# Patient Record
Sex: MALE | Race: WHITE | Employment: FULL TIME | URBAN - METROPOLITAN AREA
[De-identification: names, ages, dates, MRNs, and addresses within clinical notes are randomized per-mention and may not be internally consistent; named-entity substitution may affect disease eponyms.]

---

## 2021-01-01 ENCOUNTER — APPOINTMENT (OUTPATIENT)
Dept: GENERAL RADIOLOGY | Age: 51
DRG: 870 | End: 2021-01-01
Attending: INTERNAL MEDICINE
Payer: COMMERCIAL

## 2021-01-01 ENCOUNTER — HOSPITAL ENCOUNTER (INPATIENT)
Age: 51
LOS: 9 days | DRG: 870 | End: 2021-11-09
Attending: INTERNAL MEDICINE | Admitting: INTERNAL MEDICINE
Payer: COMMERCIAL

## 2021-01-01 VITALS
TEMPERATURE: 97 F | BODY MASS INDEX: 39.15 KG/M2 | RESPIRATION RATE: 44 BRPM | DIASTOLIC BLOOD PRESSURE: 81 MMHG | HEIGHT: 66 IN | HEART RATE: 106 BPM | WEIGHT: 243.61 LBS | OXYGEN SATURATION: 64 % | SYSTOLIC BLOOD PRESSURE: 137 MMHG

## 2021-01-01 DIAGNOSIS — J12.82 PNEUMONIA DUE TO COVID-19 VIRUS: Primary | ICD-10-CM

## 2021-01-01 DIAGNOSIS — U07.1 PNEUMONIA DUE TO COVID-19 VIRUS: Primary | ICD-10-CM

## 2021-01-01 LAB
A/G RATIO: 0.8 (ref 1.1–2.2)
ALBUMIN SERPL-MCNC: 2.3 G/DL (ref 3.4–5)
ALBUMIN SERPL-MCNC: 2.4 G/DL (ref 3.4–5)
ALBUMIN SERPL-MCNC: 2.5 G/DL (ref 3.4–5)
ALBUMIN SERPL-MCNC: 2.6 G/DL (ref 3.4–5)
ALBUMIN SERPL-MCNC: 2.7 G/DL (ref 3.4–5)
ALBUMIN SERPL-MCNC: 2.8 G/DL (ref 3.4–5)
ALBUMIN SERPL-MCNC: 2.8 G/DL (ref 3.4–5)
ALP BLD-CCNC: 118 U/L (ref 40–129)
ALP BLD-CCNC: 124 U/L (ref 40–129)
ALP BLD-CCNC: 129 U/L (ref 40–129)
ALP BLD-CCNC: 133 U/L (ref 40–129)
ALP BLD-CCNC: 142 U/L (ref 40–129)
ALP BLD-CCNC: 147 U/L (ref 40–129)
ALP BLD-CCNC: 158 U/L (ref 40–129)
ALP BLD-CCNC: 166 U/L (ref 40–129)
ALP BLD-CCNC: 168 U/L (ref 40–129)
ALP BLD-CCNC: 181 U/L (ref 40–129)
ALP BLD-CCNC: 194 U/L (ref 40–129)
ALP BLD-CCNC: 209 U/L (ref 40–129)
ALP BLD-CCNC: 211 U/L (ref 40–129)
ALT SERPL-CCNC: 102 U/L (ref 10–40)
ALT SERPL-CCNC: 38 U/L (ref 10–40)
ALT SERPL-CCNC: 40 U/L (ref 10–40)
ALT SERPL-CCNC: 52 U/L (ref 10–40)
ALT SERPL-CCNC: 6823 U/L (ref 10–40)
ALT SERPL-CCNC: 69 U/L (ref 10–40)
ALT SERPL-CCNC: 74 U/L (ref 10–40)
ALT SERPL-CCNC: 80 U/L (ref 10–40)
ALT SERPL-CCNC: 96 U/L (ref 10–40)
ALT SERPL-CCNC: >7000 U/L (ref 10–40)
ANION GAP SERPL CALCULATED.3IONS-SCNC: 10 MMOL/L (ref 3–16)
ANION GAP SERPL CALCULATED.3IONS-SCNC: 11 MMOL/L (ref 3–16)
ANION GAP SERPL CALCULATED.3IONS-SCNC: 12 MMOL/L (ref 3–16)
ANION GAP SERPL CALCULATED.3IONS-SCNC: 12 MMOL/L (ref 3–16)
ANION GAP SERPL CALCULATED.3IONS-SCNC: 13 MMOL/L (ref 3–16)
ANION GAP SERPL CALCULATED.3IONS-SCNC: 14 MMOL/L (ref 3–16)
ANION GAP SERPL CALCULATED.3IONS-SCNC: 14 MMOL/L (ref 3–16)
ANION GAP SERPL CALCULATED.3IONS-SCNC: 21 MMOL/L (ref 3–16)
ANION GAP SERPL CALCULATED.3IONS-SCNC: 22 MMOL/L (ref 3–16)
ANION GAP SERPL CALCULATED.3IONS-SCNC: 22 MMOL/L (ref 3–16)
ANION GAP SERPL CALCULATED.3IONS-SCNC: 23 MMOL/L (ref 3–16)
ANION GAP SERPL CALCULATED.3IONS-SCNC: 24 MMOL/L (ref 3–16)
ANION GAP SERPL CALCULATED.3IONS-SCNC: 24 MMOL/L (ref 3–16)
ANION GAP SERPL CALCULATED.3IONS-SCNC: 26 MMOL/L (ref 3–16)
ANION GAP SERPL CALCULATED.3IONS-SCNC: 9 MMOL/L (ref 3–16)
ANISOCYTOSIS: ABNORMAL
ANTI-XA UNFRAC HEPARIN: 0.14 IU/ML (ref 0.3–0.7)
ANTI-XA UNFRAC HEPARIN: 0.17 IU/ML (ref 0.3–0.7)
ANTI-XA UNFRAC HEPARIN: 0.25 IU/ML (ref 0.3–0.7)
ANTI-XA UNFRAC HEPARIN: 0.29 IU/ML (ref 0.3–0.7)
ANTI-XA UNFRAC HEPARIN: 0.34 IU/ML (ref 0.3–0.7)
ANTI-XA UNFRAC HEPARIN: 0.39 IU/ML (ref 0.3–0.7)
ANTI-XA UNFRAC HEPARIN: 0.42 IU/ML (ref 0.3–0.7)
ANTI-XA UNFRAC HEPARIN: 0.42 IU/ML (ref 0.3–0.7)
ANTI-XA UNFRAC HEPARIN: 0.46 IU/ML (ref 0.3–0.7)
ANTI-XA UNFRAC HEPARIN: 0.57 IU/ML (ref 0.3–0.7)
ANTI-XA UNFRAC HEPARIN: 0.58 IU/ML (ref 0.3–0.7)
ANTI-XA UNFRAC HEPARIN: 0.69 IU/ML (ref 0.3–0.7)
ANTI-XA UNFRAC HEPARIN: 0.72 IU/ML (ref 0.3–0.7)
ANTI-XA UNFRAC HEPARIN: 1.09 IU/ML (ref 0.3–0.7)
ANTI-XA UNFRAC HEPARIN: 1.35 IU/ML (ref 0.3–0.7)
APTT: 28.8 SEC (ref 26.2–38.6)
AST SERPL-CCNC: 26 U/L (ref 15–37)
AST SERPL-CCNC: 27 U/L (ref 15–37)
AST SERPL-CCNC: 27 U/L (ref 15–37)
AST SERPL-CCNC: 31 U/L (ref 15–37)
AST SERPL-CCNC: 32 U/L (ref 15–37)
AST SERPL-CCNC: 43 U/L (ref 15–37)
AST SERPL-CCNC: 49 U/L (ref 15–37)
AST SERPL-CCNC: 49 U/L (ref 15–37)
AST SERPL-CCNC: >7000 U/L (ref 15–37)
ATYPICAL LYMPHOCYTE RELATIVE PERCENT: 1 % (ref 0–6)
ATYPICAL LYMPHOCYTE RELATIVE PERCENT: 2 % (ref 0–6)
BACTERIA: ABNORMAL /HPF
BANDED NEUTROPHILS RELATIVE PERCENT: 1 % (ref 0–7)
BANDED NEUTROPHILS RELATIVE PERCENT: 11 % (ref 0–7)
BANDED NEUTROPHILS RELATIVE PERCENT: 3 % (ref 0–7)
BANDED NEUTROPHILS RELATIVE PERCENT: 3 % (ref 0–7)
BANDED NEUTROPHILS RELATIVE PERCENT: 9 % (ref 0–7)
BASE EXCESS ARTERIAL: -1.1 MMOL/L (ref -3–3)
BASE EXCESS ARTERIAL: -1.8 MMOL/L (ref -3–3)
BASE EXCESS ARTERIAL: -10 (ref -3–3)
BASE EXCESS ARTERIAL: -4 (ref -3–3)
BASE EXCESS ARTERIAL: -4 (ref -3–3)
BASE EXCESS ARTERIAL: -4.1 MMOL/L (ref -3–3)
BASE EXCESS ARTERIAL: -4.4 MMOL/L (ref -3–3)
BASE EXCESS ARTERIAL: -4.8 MMOL/L (ref -3–3)
BASE EXCESS ARTERIAL: -4.8 MMOL/L (ref -3–3)
BASE EXCESS ARTERIAL: -5 (ref -3–3)
BASE EXCESS ARTERIAL: -5.1 MMOL/L (ref -3–3)
BASE EXCESS ARTERIAL: -5.4 MMOL/L (ref -3–3)
BASE EXCESS ARTERIAL: -5.9 MMOL/L (ref -3–3)
BASE EXCESS ARTERIAL: -7 (ref -3–3)
BASE EXCESS ARTERIAL: -8.9 MMOL/L (ref -3–3)
BASE EXCESS ARTERIAL: -9 (ref -3–3)
BASE EXCESS ARTERIAL: -9 (ref -3–3)
BASE EXCESS ARTERIAL: 1 MMOL/L (ref -3–3)
BASE EXCESS ARTERIAL: 2.9 MMOL/L (ref -3–3)
BASE EXCESS ARTERIAL: 3 MMOL/L (ref -3–3)
BASE EXCESS ARTERIAL: 3.3 MMOL/L (ref -3–3)
BASE EXCESS ARTERIAL: 4.3 MMOL/L (ref -3–3)
BASE EXCESS ARTERIAL: 4.6 MMOL/L (ref -3–3)
BASE EXCESS ARTERIAL: 5 (ref -3–3)
BASE EXCESS ARTERIAL: 5 (ref -3–3)
BASE EXCESS ARTERIAL: 6.5 MMOL/L (ref -3–3)
BASE EXCESS ARTERIAL: 8.2 MMOL/L (ref -3–3)
BASOPHILS ABSOLUTE: 0 K/UL (ref 0–0.2)
BASOPHILS RELATIVE PERCENT: 0 %
BILIRUB SERPL-MCNC: 0.4 MG/DL (ref 0–1)
BILIRUB SERPL-MCNC: 0.6 MG/DL (ref 0–1)
BILIRUB SERPL-MCNC: 0.7 MG/DL (ref 0–1)
BILIRUB SERPL-MCNC: 0.7 MG/DL (ref 0–1)
BILIRUB SERPL-MCNC: 0.8 MG/DL (ref 0–1)
BILIRUB SERPL-MCNC: 0.9 MG/DL (ref 0–1)
BILIRUB SERPL-MCNC: 0.9 MG/DL (ref 0–1)
BILIRUB SERPL-MCNC: 1 MG/DL (ref 0–1)
BILIRUB SERPL-MCNC: 2.5 MG/DL (ref 0–1)
BILIRUB SERPL-MCNC: 2.6 MG/DL (ref 0–1)
BILIRUB SERPL-MCNC: 3 MG/DL (ref 0–1)
BILIRUB SERPL-MCNC: 3.8 MG/DL (ref 0–1)
BILIRUB SERPL-MCNC: 3.8 MG/DL (ref 0–1)
BILIRUBIN DIRECT: 0.3 MG/DL (ref 0–0.3)
BILIRUBIN DIRECT: 0.5 MG/DL (ref 0–0.3)
BILIRUBIN DIRECT: 0.5 MG/DL (ref 0–0.3)
BILIRUBIN DIRECT: 0.6 MG/DL (ref 0–0.3)
BILIRUBIN DIRECT: 0.7 MG/DL (ref 0–0.3)
BILIRUBIN DIRECT: 0.7 MG/DL (ref 0–0.3)
BILIRUBIN DIRECT: 0.8 MG/DL (ref 0–0.3)
BILIRUBIN DIRECT: 2.2 MG/DL (ref 0–0.3)
BILIRUBIN DIRECT: 3.3 MG/DL (ref 0–0.3)
BILIRUBIN URINE: NEGATIVE
BILIRUBIN, INDIRECT: 0.1 MG/DL (ref 0–1)
BILIRUBIN, INDIRECT: 0.2 MG/DL (ref 0–1)
BILIRUBIN, INDIRECT: 0.2 MG/DL (ref 0–1)
BILIRUBIN, INDIRECT: 0.4 MG/DL (ref 0–1)
BILIRUBIN, INDIRECT: 0.5 MG/DL (ref 0–1)
BLASTS RELATIVE PERCENT: 2 %
BLOOD CULTURE, ROUTINE: NORMAL
BLOOD, URINE: ABNORMAL
BUN BLDV-MCNC: 110 MG/DL (ref 7–20)
BUN BLDV-MCNC: 126 MG/DL (ref 7–20)
BUN BLDV-MCNC: 134 MG/DL (ref 7–20)
BUN BLDV-MCNC: 135 MG/DL (ref 7–20)
BUN BLDV-MCNC: 135 MG/DL (ref 7–20)
BUN BLDV-MCNC: 138 MG/DL (ref 7–20)
BUN BLDV-MCNC: 138 MG/DL (ref 7–20)
BUN BLDV-MCNC: 141 MG/DL (ref 7–20)
BUN BLDV-MCNC: 142 MG/DL (ref 7–20)
BUN BLDV-MCNC: 142 MG/DL (ref 7–20)
BUN BLDV-MCNC: 143 MG/DL (ref 7–20)
BUN BLDV-MCNC: 143 MG/DL (ref 7–20)
BUN BLDV-MCNC: 145 MG/DL (ref 7–20)
BUN BLDV-MCNC: 146 MG/DL (ref 7–20)
BUN BLDV-MCNC: 28 MG/DL (ref 7–20)
BUN BLDV-MCNC: 42 MG/DL (ref 7–20)
BUN BLDV-MCNC: 53 MG/DL (ref 7–20)
BUN BLDV-MCNC: 70 MG/DL (ref 7–20)
BUN BLDV-MCNC: 90 MG/DL (ref 7–20)
BUN BLDV-MCNC: 99 MG/DL (ref 7–20)
BUN BLDV-MCNC: 99 MG/DL (ref 7–20)
C-REACTIVE PROTEIN: 434.3 MG/L (ref 0–5.1)
C-REACTIVE PROTEIN: 47.8 MG/L (ref 0–5.1)
C-REACTIVE PROTEIN: 66.2 MG/L (ref 0–5.1)
CALCIUM IONIZED: 0.8 MMOL/L (ref 1.12–1.32)
CALCIUM IONIZED: 0.83 MMOL/L (ref 1.12–1.32)
CALCIUM IONIZED: 1.18 MMOL/L (ref 1.12–1.32)
CALCIUM IONIZED: 1.24 MMOL/L (ref 1.12–1.32)
CALCIUM IONIZED: 1.26 MMOL/L (ref 1.12–1.32)
CALCIUM IONIZED: 1.3 MMOL/L (ref 1.12–1.32)
CALCIUM IONIZED: 1.31 MMOL/L (ref 1.12–1.32)
CALCIUM SERPL-MCNC: 6.1 MG/DL (ref 8.3–10.6)
CALCIUM SERPL-MCNC: 6.2 MG/DL (ref 8.3–10.6)
CALCIUM SERPL-MCNC: 6.2 MG/DL (ref 8.3–10.6)
CALCIUM SERPL-MCNC: 6.3 MG/DL (ref 8.3–10.6)
CALCIUM SERPL-MCNC: 7.6 MG/DL (ref 8.3–10.6)
CALCIUM SERPL-MCNC: 8 MG/DL (ref 8.3–10.6)
CALCIUM SERPL-MCNC: 8 MG/DL (ref 8.3–10.6)
CALCIUM SERPL-MCNC: 8.1 MG/DL (ref 8.3–10.6)
CALCIUM SERPL-MCNC: 8.2 MG/DL (ref 8.3–10.6)
CALCIUM SERPL-MCNC: 8.3 MG/DL (ref 8.3–10.6)
CALCIUM SERPL-MCNC: 8.4 MG/DL (ref 8.3–10.6)
CALCIUM SERPL-MCNC: 8.6 MG/DL (ref 8.3–10.6)
CALCIUM SERPL-MCNC: 8.6 MG/DL (ref 8.3–10.6)
CARBOXYHEMOGLOBIN ARTERIAL: 0.4 % (ref 0–1.5)
CARBOXYHEMOGLOBIN ARTERIAL: 1.5 % (ref 0–1.5)
CARBOXYHEMOGLOBIN ARTERIAL: 1.7 % (ref 0–1.5)
CARBOXYHEMOGLOBIN ARTERIAL: 1.9 % (ref 0–1.5)
CARBOXYHEMOGLOBIN ARTERIAL: 2 % (ref 0–1.5)
CARBOXYHEMOGLOBIN ARTERIAL: 2.1 % (ref 0–1.5)
CARBOXYHEMOGLOBIN ARTERIAL: 2.2 % (ref 0–1.5)
CARBOXYHEMOGLOBIN ARTERIAL: 2.3 % (ref 0–1.5)
CARBOXYHEMOGLOBIN ARTERIAL: 2.4 % (ref 0–1.5)
CARBOXYHEMOGLOBIN ARTERIAL: 2.5 % (ref 0–1.5)
CARBOXYHEMOGLOBIN ARTERIAL: 2.5 % (ref 0–1.5)
CARBOXYHEMOGLOBIN ARTERIAL: 2.6 % (ref 0–1.5)
CARBOXYHEMOGLOBIN ARTERIAL: 2.8 % (ref 0–1.5)
CELLULAR CASTS: ABNORMAL /LPF
CHLORIDE BLD-SCNC: 101 MMOL/L (ref 99–110)
CHLORIDE BLD-SCNC: 104 MMOL/L (ref 99–110)
CHLORIDE BLD-SCNC: 105 MMOL/L (ref 99–110)
CHLORIDE BLD-SCNC: 106 MMOL/L (ref 99–110)
CHLORIDE BLD-SCNC: 106 MMOL/L (ref 99–110)
CHLORIDE BLD-SCNC: 107 MMOL/L (ref 99–110)
CHLORIDE BLD-SCNC: 107 MMOL/L (ref 99–110)
CHLORIDE BLD-SCNC: 108 MMOL/L (ref 99–110)
CHLORIDE BLD-SCNC: 109 MMOL/L (ref 99–110)
CHLORIDE BLD-SCNC: 109 MMOL/L (ref 99–110)
CHLORIDE BLD-SCNC: 110 MMOL/L (ref 99–110)
CHLORIDE BLD-SCNC: 88 MMOL/L (ref 99–110)
CHLORIDE BLD-SCNC: 89 MMOL/L (ref 99–110)
CHLORIDE BLD-SCNC: 94 MMOL/L (ref 99–110)
CHLORIDE BLD-SCNC: 94 MMOL/L (ref 99–110)
CHLORIDE BLD-SCNC: 97 MMOL/L (ref 99–110)
CHLORIDE BLD-SCNC: 99 MMOL/L (ref 99–110)
CLARITY: ABNORMAL
CO2: 17 MMOL/L (ref 21–32)
CO2: 17 MMOL/L (ref 21–32)
CO2: 18 MMOL/L (ref 21–32)
CO2: 18 MMOL/L (ref 21–32)
CO2: 19 MMOL/L (ref 21–32)
CO2: 19 MMOL/L (ref 21–32)
CO2: 20 MMOL/L (ref 21–32)
CO2: 21 MMOL/L (ref 21–32)
CO2: 21 MMOL/L (ref 21–32)
CO2: 22 MMOL/L (ref 21–32)
CO2: 23 MMOL/L (ref 21–32)
CO2: 25 MMOL/L (ref 21–32)
CO2: 27 MMOL/L (ref 21–32)
CO2: 27 MMOL/L (ref 21–32)
CO2: 28 MMOL/L (ref 21–32)
CO2: 28 MMOL/L (ref 21–32)
CO2: 29 MMOL/L (ref 21–32)
CO2: 29 MMOL/L (ref 21–32)
COLOR: YELLOW
CREAT SERPL-MCNC: 1.2 MG/DL (ref 0.9–1.3)
CREAT SERPL-MCNC: 1.8 MG/DL (ref 0.9–1.3)
CREAT SERPL-MCNC: 2 MG/DL (ref 0.9–1.3)
CREAT SERPL-MCNC: 2.5 MG/DL (ref 0.9–1.3)
CREAT SERPL-MCNC: 3.1 MG/DL (ref 0.9–1.3)
CREAT SERPL-MCNC: 3.4 MG/DL (ref 0.9–1.3)
CREAT SERPL-MCNC: 3.5 MG/DL (ref 0.9–1.3)
CREAT SERPL-MCNC: 3.6 MG/DL (ref 0.9–1.3)
CREAT SERPL-MCNC: 3.8 MG/DL (ref 0.9–1.3)
CREAT SERPL-MCNC: 3.9 MG/DL (ref 0.9–1.3)
CREAT SERPL-MCNC: 4 MG/DL (ref 0.9–1.3)
CREAT SERPL-MCNC: 4 MG/DL (ref 0.9–1.3)
CREAT SERPL-MCNC: 4.1 MG/DL (ref 0.9–1.3)
CREAT SERPL-MCNC: 4.2 MG/DL (ref 0.9–1.3)
CREAT SERPL-MCNC: 4.3 MG/DL (ref 0.9–1.3)
CREAT SERPL-MCNC: 4.3 MG/DL (ref 0.9–1.3)
CREAT SERPL-MCNC: 4.9 MG/DL (ref 0.9–1.3)
CREAT SERPL-MCNC: 5 MG/DL (ref 0.9–1.3)
CREAT SERPL-MCNC: 5.1 MG/DL (ref 0.9–1.3)
CREAT SERPL-MCNC: 5.1 MG/DL (ref 0.9–1.3)
CREAT SERPL-MCNC: 5.2 MG/DL (ref 0.9–1.3)
CULTURE, BLOOD 2: NORMAL
D DIMER: 2262 NG/ML DDU (ref 0–229)
EKG ATRIAL RATE: 106 BPM
EKG ATRIAL RATE: 120 BPM
EKG ATRIAL RATE: 129 BPM
EKG DIAGNOSIS: NORMAL
EKG P AXIS: 48 DEGREES
EKG P AXIS: 49 DEGREES
EKG P AXIS: 53 DEGREES
EKG P-R INTERVAL: 112 MS
EKG P-R INTERVAL: 116 MS
EKG P-R INTERVAL: 116 MS
EKG Q-T INTERVAL: 296 MS
EKG Q-T INTERVAL: 322 MS
EKG Q-T INTERVAL: 328 MS
EKG QRS DURATION: 84 MS
EKG QRS DURATION: 86 MS
EKG QRS DURATION: 86 MS
EKG QTC CALCULATION (BAZETT): 433 MS
EKG QTC CALCULATION (BAZETT): 435 MS
EKG QTC CALCULATION (BAZETT): 455 MS
EKG R AXIS: 48 DEGREES
EKG R AXIS: 58 DEGREES
EKG R AXIS: 59 DEGREES
EKG T AXIS: -11 DEGREES
EKG T AXIS: 34 DEGREES
EKG T AXIS: 52 DEGREES
EKG VENTRICULAR RATE: 106 BPM
EKG VENTRICULAR RATE: 120 BPM
EKG VENTRICULAR RATE: 129 BPM
EOSINOPHILS ABSOLUTE: 0 K/UL (ref 0–0.6)
EOSINOPHILS ABSOLUTE: 0.2 K/UL (ref 0–0.6)
EOSINOPHILS ABSOLUTE: 0.2 K/UL (ref 0–0.6)
EOSINOPHILS ABSOLUTE: 0.3 K/UL (ref 0–0.6)
EOSINOPHILS RELATIVE PERCENT: 0 %
EOSINOPHILS RELATIVE PERCENT: 1 %
EOSINOPHILS RELATIVE PERCENT: 1 %
EOSINOPHILS RELATIVE PERCENT: 2 %
ESTIMATED AVERAGE GLUCOSE: 142.7 MG/DL
FIBRINOGEN: 356 MG/DL (ref 200–397)
FINE CASTS, UA: ABNORMAL /LPF (ref 0–2)
GFR AFRICAN AMERICAN: 14
GFR AFRICAN AMERICAN: 15
GFR AFRICAN AMERICAN: 15
GFR AFRICAN AMERICAN: 18
GFR AFRICAN AMERICAN: 19
GFR AFRICAN AMERICAN: 20
GFR AFRICAN AMERICAN: 20
GFR AFRICAN AMERICAN: 22
GFR AFRICAN AMERICAN: 23
GFR AFRICAN AMERICAN: 26
GFR AFRICAN AMERICAN: 33
GFR AFRICAN AMERICAN: 43
GFR AFRICAN AMERICAN: 48
GFR AFRICAN AMERICAN: >60
GFR NON-AFRICAN AMERICAN: 12
GFR NON-AFRICAN AMERICAN: 13
GFR NON-AFRICAN AMERICAN: 15
GFR NON-AFRICAN AMERICAN: 16
GFR NON-AFRICAN AMERICAN: 17
GFR NON-AFRICAN AMERICAN: 18
GFR NON-AFRICAN AMERICAN: 19
GFR NON-AFRICAN AMERICAN: 21
GFR NON-AFRICAN AMERICAN: 27
GFR NON-AFRICAN AMERICAN: 35
GFR NON-AFRICAN AMERICAN: 40
GFR NON-AFRICAN AMERICAN: >60
GLUCOSE BLD-MCNC: 105 MG/DL (ref 70–99)
GLUCOSE BLD-MCNC: 109 MG/DL (ref 70–99)
GLUCOSE BLD-MCNC: 119 MG/DL (ref 70–99)
GLUCOSE BLD-MCNC: 123 MG/DL (ref 70–99)
GLUCOSE BLD-MCNC: 125 MG/DL (ref 70–99)
GLUCOSE BLD-MCNC: 141 MG/DL (ref 70–99)
GLUCOSE BLD-MCNC: 141 MG/DL (ref 70–99)
GLUCOSE BLD-MCNC: 145 MG/DL (ref 70–99)
GLUCOSE BLD-MCNC: 146 MG/DL (ref 70–99)
GLUCOSE BLD-MCNC: 150 MG/DL (ref 70–99)
GLUCOSE BLD-MCNC: 155 MG/DL (ref 70–99)
GLUCOSE BLD-MCNC: 157 MG/DL (ref 70–99)
GLUCOSE BLD-MCNC: 159 MG/DL (ref 70–99)
GLUCOSE BLD-MCNC: 162 MG/DL (ref 70–99)
GLUCOSE BLD-MCNC: 163 MG/DL (ref 70–99)
GLUCOSE BLD-MCNC: 167 MG/DL (ref 70–99)
GLUCOSE BLD-MCNC: 167 MG/DL (ref 70–99)
GLUCOSE BLD-MCNC: 169 MG/DL (ref 70–99)
GLUCOSE BLD-MCNC: 174 MG/DL (ref 70–99)
GLUCOSE BLD-MCNC: 178 MG/DL (ref 70–99)
GLUCOSE BLD-MCNC: 178 MG/DL (ref 70–99)
GLUCOSE BLD-MCNC: 180 MG/DL (ref 70–99)
GLUCOSE BLD-MCNC: 182 MG/DL (ref 70–99)
GLUCOSE BLD-MCNC: 183 MG/DL (ref 70–99)
GLUCOSE BLD-MCNC: 183 MG/DL (ref 70–99)
GLUCOSE BLD-MCNC: 184 MG/DL (ref 70–99)
GLUCOSE BLD-MCNC: 187 MG/DL (ref 70–99)
GLUCOSE BLD-MCNC: 194 MG/DL (ref 70–99)
GLUCOSE BLD-MCNC: 194 MG/DL (ref 70–99)
GLUCOSE BLD-MCNC: 195 MG/DL (ref 70–99)
GLUCOSE BLD-MCNC: 196 MG/DL (ref 70–99)
GLUCOSE BLD-MCNC: 197 MG/DL (ref 70–99)
GLUCOSE BLD-MCNC: 199 MG/DL (ref 70–99)
GLUCOSE BLD-MCNC: 203 MG/DL (ref 70–99)
GLUCOSE BLD-MCNC: 203 MG/DL (ref 70–99)
GLUCOSE BLD-MCNC: 204 MG/DL (ref 70–99)
GLUCOSE BLD-MCNC: 205 MG/DL (ref 70–99)
GLUCOSE BLD-MCNC: 209 MG/DL (ref 70–99)
GLUCOSE BLD-MCNC: 212 MG/DL (ref 70–99)
GLUCOSE BLD-MCNC: 212 MG/DL (ref 70–99)
GLUCOSE BLD-MCNC: 220 MG/DL (ref 70–99)
GLUCOSE BLD-MCNC: 225 MG/DL (ref 70–99)
GLUCOSE BLD-MCNC: 230 MG/DL (ref 70–99)
GLUCOSE BLD-MCNC: 231 MG/DL (ref 70–99)
GLUCOSE BLD-MCNC: 233 MG/DL (ref 70–99)
GLUCOSE BLD-MCNC: 236 MG/DL (ref 70–99)
GLUCOSE BLD-MCNC: 238 MG/DL (ref 70–99)
GLUCOSE BLD-MCNC: 243 MG/DL (ref 70–99)
GLUCOSE BLD-MCNC: 243 MG/DL (ref 70–99)
GLUCOSE BLD-MCNC: 249 MG/DL (ref 70–99)
GLUCOSE BLD-MCNC: 257 MG/DL (ref 70–99)
GLUCOSE BLD-MCNC: 272 MG/DL (ref 70–99)
GLUCOSE BLD-MCNC: 281 MG/DL (ref 70–99)
GLUCOSE BLD-MCNC: 281 MG/DL (ref 70–99)
GLUCOSE BLD-MCNC: 283 MG/DL (ref 70–99)
GLUCOSE BLD-MCNC: 286 MG/DL (ref 70–99)
GLUCOSE BLD-MCNC: 300 MG/DL (ref 70–99)
GLUCOSE BLD-MCNC: 301 MG/DL (ref 70–99)
GLUCOSE BLD-MCNC: 306 MG/DL (ref 70–99)
GLUCOSE URINE: NEGATIVE MG/DL
HBA1C MFR BLD: 6.6 %
HCO3 ARTERIAL: 20 MMOL/L (ref 21–29)
HCO3 ARTERIAL: 20.2 MMOL/L (ref 21–29)
HCO3 ARTERIAL: 20.3 MMOL/L (ref 21–29)
HCO3 ARTERIAL: 20.5 MMOL/L (ref 21–29)
HCO3 ARTERIAL: 21.5 MMOL/L (ref 21–29)
HCO3 ARTERIAL: 24 MMOL/L (ref 21–29)
HCO3 ARTERIAL: 24.4 MMOL/L (ref 21–29)
HCO3 ARTERIAL: 24.5 MMOL/L (ref 21–29)
HCO3 ARTERIAL: 24.6 MMOL/L (ref 21–29)
HCO3 ARTERIAL: 24.7 MMOL/L (ref 21–29)
HCO3 ARTERIAL: 25 MMOL/L (ref 21–29)
HCO3 ARTERIAL: 27 MMOL/L (ref 21–29)
HCO3 ARTERIAL: 28 MMOL/L (ref 21–29)
HCO3 ARTERIAL: 29 MMOL/L (ref 21–29)
HCO3 ARTERIAL: 30.9 MMOL/L (ref 21–29)
HCO3 ARTERIAL: 31 MMOL/L (ref 21–29)
HCO3 ARTERIAL: 32 MMOL/L (ref 21–29)
HCO3 ARTERIAL: 32.2 MMOL/L (ref 21–29)
HCO3 ARTERIAL: 33 MMOL/L (ref 21–29)
HCO3 ARTERIAL: 33 MMOL/L (ref 21–29)
HCO3 ARTERIAL: 34 MMOL/L (ref 21–29)
HCT VFR BLD CALC: 27.4 % (ref 40.5–52.5)
HCT VFR BLD CALC: 30.8 % (ref 40.5–52.5)
HCT VFR BLD CALC: 31.2 % (ref 40.5–52.5)
HCT VFR BLD CALC: 31.7 % (ref 40.5–52.5)
HCT VFR BLD CALC: 32.6 % (ref 40.5–52.5)
HCT VFR BLD CALC: 34 % (ref 40.5–52.5)
HCT VFR BLD CALC: 34.2 % (ref 40.5–52.5)
HCT VFR BLD CALC: 35 % (ref 40.5–52.5)
HCT VFR BLD CALC: 35.3 % (ref 40.5–52.5)
HCT VFR BLD CALC: 36.7 % (ref 40.5–52.5)
HCT VFR BLD CALC: 40.1 % (ref 40.5–52.5)
HEMATOLOGY PATH CONSULT: NO
HEMATOLOGY PATH CONSULT: NORMAL
HEMATOLOGY PATH CONSULT: YES
HEMOGLOBIN, ART, EXTENDED: 10.2 G/DL
HEMOGLOBIN, ART, EXTENDED: 10.8 G/DL
HEMOGLOBIN, ART, EXTENDED: 11.1 G/DL
HEMOGLOBIN, ART, EXTENDED: 11.1 G/DL
HEMOGLOBIN, ART, EXTENDED: 11.2 G/DL
HEMOGLOBIN, ART, EXTENDED: 11.2 G/DL
HEMOGLOBIN, ART, EXTENDED: 11.3 G/DL
HEMOGLOBIN, ART, EXTENDED: 11.3 G/DL
HEMOGLOBIN, ART, EXTENDED: 11.5 G/DL
HEMOGLOBIN, ART, EXTENDED: 11.7 G/DL
HEMOGLOBIN, ART, EXTENDED: 12 G/DL
HEMOGLOBIN, ART, EXTENDED: 12.3 G/DL
HEMOGLOBIN, ART, EXTENDED: 5.7 G/DL
HEMOGLOBIN, ART, EXTENDED: 6.8 G/DL
HEMOGLOBIN, ART, EXTENDED: 6.9 G/DL
HEMOGLOBIN, ART, EXTENDED: 8 G/DL
HEMOGLOBIN, ART, EXTENDED: 9.8 G/DL
HEMOGLOBIN, ART, EXTENDED: <5 G/DL
HEMOGLOBIN: 10 G/DL (ref 13.5–17.5)
HEMOGLOBIN: 10 G/DL (ref 13.5–17.5)
HEMOGLOBIN: 10.4 G/DL (ref 13.5–17.5)
HEMOGLOBIN: 10.4 G/DL (ref 13.5–17.5)
HEMOGLOBIN: 11.2 G/DL (ref 13.5–17.5)
HEMOGLOBIN: 11.5 G/DL (ref 13.5–17.5)
HEMOGLOBIN: 11.6 G/DL (ref 13.5–17.5)
HEMOGLOBIN: 12.9 G/DL (ref 13.5–17.5)
HEMOGLOBIN: 8.8 G/DL (ref 13.5–17.5)
INR BLD: 1.02 (ref 0.88–1.12)
INR BLD: 1.72 (ref 0.88–1.12)
KETONES, URINE: NEGATIVE MG/DL
LACTATE: 0.98 MMOL/L (ref 0.4–2)
LACTATE: 1.34 MMOL/L (ref 0.4–2)
LACTATE: 1.44 MMOL/L (ref 0.4–2)
LACTATE: 1.54 MMOL/L (ref 0.4–2)
LACTATE: 1.55 MMOL/L (ref 0.4–2)
LACTATE: 1.72 MMOL/L (ref 0.4–2)
LACTIC ACID: 2 MMOL/L (ref 0.4–2)
LEUKOCYTE ESTERASE, URINE: ABNORMAL
LV EF: 65 %
LVEF MODALITY: NORMAL
LYMPHOCYTES ABSOLUTE: 0.2 K/UL (ref 1–5.1)
LYMPHOCYTES ABSOLUTE: 0.2 K/UL (ref 1–5.1)
LYMPHOCYTES ABSOLUTE: 0.3 K/UL (ref 1–5.1)
LYMPHOCYTES ABSOLUTE: 0.3 K/UL (ref 1–5.1)
LYMPHOCYTES ABSOLUTE: 0.5 K/UL (ref 1–5.1)
LYMPHOCYTES ABSOLUTE: 0.8 K/UL (ref 1–5.1)
LYMPHOCYTES ABSOLUTE: 0.9 K/UL (ref 1–5.1)
LYMPHOCYTES ABSOLUTE: 1 K/UL (ref 1–5.1)
LYMPHOCYTES ABSOLUTE: 1.1 K/UL (ref 1–5.1)
LYMPHOCYTES ABSOLUTE: 1.3 K/UL (ref 1–5.1)
LYMPHOCYTES RELATIVE PERCENT: 1 %
LYMPHOCYTES RELATIVE PERCENT: 2 %
LYMPHOCYTES RELATIVE PERCENT: 5 %
MACROCYTES: ABNORMAL
MAGNESIUM: 2.5 MG/DL (ref 1.8–2.4)
MAGNESIUM: 2.8 MG/DL (ref 1.8–2.4)
MAGNESIUM: 3.5 MG/DL (ref 1.8–2.4)
MCH RBC QN AUTO: 29.1 PG (ref 26–34)
MCH RBC QN AUTO: 29.5 PG (ref 26–34)
MCH RBC QN AUTO: 29.5 PG (ref 26–34)
MCH RBC QN AUTO: 29.6 PG (ref 26–34)
MCH RBC QN AUTO: 29.6 PG (ref 26–34)
MCH RBC QN AUTO: 29.7 PG (ref 26–34)
MCH RBC QN AUTO: 29.8 PG (ref 26–34)
MCH RBC QN AUTO: 29.8 PG (ref 26–34)
MCH RBC QN AUTO: 30.1 PG (ref 26–34)
MCHC RBC AUTO-ENTMCNC: 31.6 G/DL (ref 31–36)
MCHC RBC AUTO-ENTMCNC: 31.7 G/DL (ref 31–36)
MCHC RBC AUTO-ENTMCNC: 31.8 G/DL (ref 31–36)
MCHC RBC AUTO-ENTMCNC: 31.9 G/DL (ref 31–36)
MCHC RBC AUTO-ENTMCNC: 32.2 G/DL (ref 31–36)
MCHC RBC AUTO-ENTMCNC: 32.2 G/DL (ref 31–36)
MCHC RBC AUTO-ENTMCNC: 32.5 G/DL (ref 31–36)
MCHC RBC AUTO-ENTMCNC: 32.7 G/DL (ref 31–36)
MCHC RBC AUTO-ENTMCNC: 32.8 G/DL (ref 31–36)
MCV RBC AUTO: 90.7 FL (ref 80–100)
MCV RBC AUTO: 90.8 FL (ref 80–100)
MCV RBC AUTO: 90.8 FL (ref 80–100)
MCV RBC AUTO: 91 FL (ref 80–100)
MCV RBC AUTO: 91.4 FL (ref 80–100)
MCV RBC AUTO: 91.7 FL (ref 80–100)
MCV RBC AUTO: 91.8 FL (ref 80–100)
MCV RBC AUTO: 92.1 FL (ref 80–100)
MCV RBC AUTO: 93 FL (ref 80–100)
MCV RBC AUTO: 93.2 FL (ref 80–100)
MCV RBC AUTO: 93.4 FL (ref 80–100)
METAMYELOCYTES RELATIVE PERCENT: 1 %
METAMYELOCYTES RELATIVE PERCENT: 2 %
METAMYELOCYTES RELATIVE PERCENT: 4 %
METAMYELOCYTES RELATIVE PERCENT: 6 %
METAMYELOCYTES RELATIVE PERCENT: 7 %
METAMYELOCYTES RELATIVE PERCENT: 9 %
METHEMOGLOBIN ARTERIAL: 0.1 % (ref 0–1.4)
METHEMOGLOBIN ARTERIAL: 0.2 % (ref 0–1.4)
METHEMOGLOBIN ARTERIAL: 0.3 % (ref 0–1.4)
METHEMOGLOBIN ARTERIAL: 0.4 % (ref 0–1.4)
METHEMOGLOBIN ARTERIAL: 0.5 % (ref 0–1.4)
METHEMOGLOBIN ARTERIAL: 0.6 % (ref 0–1.4)
METHEMOGLOBIN ARTERIAL: 0.7 % (ref 0–1.4)
METHEMOGLOBIN ARTERIAL: 0.9 % (ref 0–1.4)
METHEMOGLOBIN ARTERIAL: 1.2 % (ref 0–1.4)
MICROSCOPIC EXAMINATION: YES
MONOCYTES ABSOLUTE: 0 K/UL (ref 0–1.3)
MONOCYTES ABSOLUTE: 0.3 K/UL (ref 0–1.3)
MONOCYTES ABSOLUTE: 0.4 K/UL (ref 0–1.3)
MONOCYTES ABSOLUTE: 0.5 K/UL (ref 0–1.3)
MONOCYTES ABSOLUTE: 1.3 K/UL (ref 0–1.3)
MONOCYTES ABSOLUTE: 1.4 K/UL (ref 0–1.3)
MONOCYTES ABSOLUTE: 1.5 K/UL (ref 0–1.3)
MONOCYTES ABSOLUTE: 1.7 K/UL (ref 0–1.3)
MONOCYTES ABSOLUTE: 2.2 K/UL (ref 0–1.3)
MONOCYTES ABSOLUTE: 5.5 K/UL (ref 0–1.3)
MONOCYTES RELATIVE PERCENT: 0 %
MONOCYTES RELATIVE PERCENT: 1 %
MONOCYTES RELATIVE PERCENT: 1 %
MONOCYTES RELATIVE PERCENT: 10 %
MONOCYTES RELATIVE PERCENT: 11 %
MONOCYTES RELATIVE PERCENT: 14 %
MONOCYTES RELATIVE PERCENT: 2 %
MONOCYTES RELATIVE PERCENT: 7 %
MONOCYTES RELATIVE PERCENT: 7 %
MONOCYTES RELATIVE PERCENT: 8 %
MYELOCYTE PERCENT: 1 %
MYELOCYTE PERCENT: 1 %
MYELOCYTE PERCENT: 3 %
MYELOCYTE PERCENT: 6 %
MYELOCYTE PERCENT: 6 %
NEUTROPHILS ABSOLUTE: 13.9 K/UL (ref 1.7–7.7)
NEUTROPHILS ABSOLUTE: 15.5 K/UL (ref 1.7–7.7)
NEUTROPHILS ABSOLUTE: 16.5 K/UL (ref 1.7–7.7)
NEUTROPHILS ABSOLUTE: 17.7 K/UL (ref 1.7–7.7)
NEUTROPHILS ABSOLUTE: 18.2 K/UL (ref 1.7–7.7)
NEUTROPHILS ABSOLUTE: 20.3 K/UL (ref 1.7–7.7)
NEUTROPHILS ABSOLUTE: 20.4 K/UL (ref 1.7–7.7)
NEUTROPHILS ABSOLUTE: 29 K/UL (ref 1.7–7.7)
NEUTROPHILS ABSOLUTE: 33.2 K/UL (ref 1.7–7.7)
NEUTROPHILS ABSOLUTE: 46.2 K/UL (ref 1.7–7.7)
NEUTROPHILS RELATIVE PERCENT: 63 %
NEUTROPHILS RELATIVE PERCENT: 66 %
NEUTROPHILS RELATIVE PERCENT: 75 %
NEUTROPHILS RELATIVE PERCENT: 79 %
NEUTROPHILS RELATIVE PERCENT: 82 %
NEUTROPHILS RELATIVE PERCENT: 87 %
NEUTROPHILS RELATIVE PERCENT: 88 %
NEUTROPHILS RELATIVE PERCENT: 93 %
NEUTROPHILS RELATIVE PERCENT: 94 %
NEUTROPHILS RELATIVE PERCENT: 95 %
NITRITE, URINE: NEGATIVE
NUCLEATED RED BLOOD CELLS: 2 /100 WBC
NUCLEATED RED BLOOD CELLS: 4 /100 WBC
O2 SAT, ARTERIAL: 65 % (ref 93–100)
O2 SAT, ARTERIAL: 72 % (ref 93–100)
O2 SAT, ARTERIAL: 81 % (ref 93–100)
O2 SAT, ARTERIAL: 85 % (ref 93–100)
O2 SAT, ARTERIAL: 85 % (ref 93–100)
O2 SAT, ARTERIAL: 86 % (ref 93–100)
O2 SAT, ARTERIAL: 87 % (ref 93–100)
O2 SAT, ARTERIAL: 87 % (ref 93–100)
O2 SAT, ARTERIAL: 89 % (ref 93–100)
O2 SAT, ARTERIAL: 89 % (ref 93–100)
O2 SAT, ARTERIAL: 90 % (ref 93–100)
O2 SAT, ARTERIAL: 91 % (ref 93–100)
O2 SAT, ARTERIAL: 92 % (ref 93–100)
O2 SAT, ARTERIAL: 93 % (ref 93–100)
O2 SAT, ARTERIAL: 93 % (ref 93–100)
O2 SAT, ARTERIAL: 94 % (ref 93–100)
O2 SAT, ARTERIAL: 95 % (ref 93–100)
O2 SAT, ARTERIAL: 96 % (ref 93–100)
O2 SAT, ARTERIAL: 97 % (ref 93–100)
O2 SAT, ARTERIAL: 98 % (ref 93–100)
O2 SAT, ARTERIAL: 98 % (ref 93–100)
O2 SAT, ARTERIAL: 99 % (ref 93–100)
PCO2 ARTERIAL: 56.8 MM HG (ref 35–45)
PCO2 ARTERIAL: 57.4 MMHG (ref 35–45)
PCO2 ARTERIAL: 58.3 MMHG (ref 35–45)
PCO2 ARTERIAL: 58.7 MM HG (ref 35–45)
PCO2 ARTERIAL: 59.3 MM HG (ref 35–45)
PCO2 ARTERIAL: 64.7 MMHG (ref 35–45)
PCO2 ARTERIAL: 64.7 MMHG (ref 35–45)
PCO2 ARTERIAL: 66 MMHG (ref 35–45)
PCO2 ARTERIAL: 66.9 MM HG (ref 35–45)
PCO2 ARTERIAL: 67.2 MM HG (ref 35–45)
PCO2 ARTERIAL: 67.7 MMHG (ref 35–45)
PCO2 ARTERIAL: 68.2 MM HG (ref 35–45)
PCO2 ARTERIAL: 68.7 MMHG (ref 35–45)
PCO2 ARTERIAL: 69.5 MMHG (ref 35–45)
PCO2 ARTERIAL: 70.1 MMHG (ref 35–45)
PCO2 ARTERIAL: 70.2 MMHG (ref 35–45)
PCO2 ARTERIAL: 71.4 MMHG (ref 35–45)
PCO2 ARTERIAL: 71.5 MM HG (ref 35–45)
PCO2 ARTERIAL: 71.8 MMHG (ref 35–45)
PCO2 ARTERIAL: 71.9 MMHG (ref 35–45)
PCO2 ARTERIAL: 72.5 MM HG (ref 35–45)
PCO2 ARTERIAL: 72.8 MM HG (ref 35–45)
PCO2 ARTERIAL: 72.8 MMHG (ref 35–45)
PCO2 ARTERIAL: 74.4 MMHG (ref 35–45)
PCO2 ARTERIAL: 74.8 MMHG (ref 35–45)
PCO2 ARTERIAL: 75 MM HG (ref 35–45)
PCO2 ARTERIAL: 75.9 MM HG (ref 35–45)
PCO2 ARTERIAL: 78 MMHG (ref 35–45)
PCO2 ARTERIAL: 79.8 MM HG (ref 35–45)
PCO2 ARTERIAL: 80.9 MMHG (ref 35–45)
PDW BLD-RTO: 14.3 % (ref 12.4–15.4)
PDW BLD-RTO: 14.6 % (ref 12.4–15.4)
PDW BLD-RTO: 14.7 % (ref 12.4–15.4)
PDW BLD-RTO: 14.7 % (ref 12.4–15.4)
PDW BLD-RTO: 14.8 % (ref 12.4–15.4)
PDW BLD-RTO: 14.8 % (ref 12.4–15.4)
PDW BLD-RTO: 15 % (ref 12.4–15.4)
PDW BLD-RTO: 15 % (ref 12.4–15.4)
PDW BLD-RTO: 15.2 % (ref 12.4–15.4)
PDW BLD-RTO: 15.3 % (ref 12.4–15.4)
PDW BLD-RTO: 15.9 % (ref 12.4–15.4)
PERFORMED ON: ABNORMAL
PH ARTERIAL: 7.04 (ref 7.35–7.45)
PH ARTERIAL: 7.09 (ref 7.35–7.45)
PH ARTERIAL: 7.1 (ref 7.35–7.45)
PH ARTERIAL: 7.1 (ref 7.35–7.45)
PH ARTERIAL: 7.13 (ref 7.35–7.45)
PH ARTERIAL: 7.14 (ref 7.35–7.45)
PH ARTERIAL: 7.16 (ref 7.35–7.45)
PH ARTERIAL: 7.17 (ref 7.35–7.45)
PH ARTERIAL: 7.18 (ref 7.35–7.45)
PH ARTERIAL: 7.19 (ref 7.35–7.45)
PH ARTERIAL: 7.2 (ref 7.35–7.45)
PH ARTERIAL: 7.2 (ref 7.35–7.45)
PH ARTERIAL: 7.22 (ref 7.35–7.45)
PH ARTERIAL: 7.24 (ref 7.35–7.45)
PH ARTERIAL: 7.24 (ref 7.35–7.45)
PH ARTERIAL: 7.25 (ref 7.35–7.45)
PH ARTERIAL: 7.25 (ref 7.35–7.45)
PH ARTERIAL: 7.26 (ref 7.35–7.45)
PH ARTERIAL: 7.27 (ref 7.35–7.45)
PH ARTERIAL: 7.27 (ref 7.35–7.45)
PH ARTERIAL: 7.32 (ref 7.35–7.45)
PH ARTERIAL: 7.38 (ref 7.35–7.45)
PH UA: 6 (ref 5–8)
PH VENOUS: 7.06 (ref 7.35–7.45)
PH VENOUS: 7.13 (ref 7.35–7.45)
PHOSPHORUS: 10.2 MG/DL (ref 2.5–4.9)
PHOSPHORUS: 10.2 MG/DL (ref 2.5–4.9)
PHOSPHORUS: 10.3 MG/DL (ref 2.5–4.9)
PHOSPHORUS: 11.1 MG/DL (ref 2.5–4.9)
PHOSPHORUS: 11.6 MG/DL (ref 2.5–4.9)
PHOSPHORUS: 11.7 MG/DL (ref 2.5–4.9)
PHOSPHORUS: 12.5 MG/DL (ref 2.5–4.9)
PHOSPHORUS: 4.8 MG/DL (ref 2.5–4.9)
PHOSPHORUS: 4.9 MG/DL (ref 2.5–4.9)
PHOSPHORUS: 5.7 MG/DL (ref 2.5–4.9)
PHOSPHORUS: 5.9 MG/DL (ref 2.5–4.9)
PHOSPHORUS: 6 MG/DL (ref 2.5–4.9)
PHOSPHORUS: 6.1 MG/DL (ref 2.5–4.9)
PHOSPHORUS: 6.5 MG/DL (ref 2.5–4.9)
PHOSPHORUS: 6.7 MG/DL (ref 2.5–4.9)
PHOSPHORUS: 6.9 MG/DL (ref 2.5–4.9)
PHOSPHORUS: 7 MG/DL (ref 2.5–4.9)
PHOSPHORUS: 7.3 MG/DL (ref 2.5–4.9)
PHOSPHORUS: 7.4 MG/DL (ref 2.5–4.9)
PHOSPHORUS: 7.7 MG/DL (ref 2.5–4.9)
PHOSPHORUS: 8.2 MG/DL (ref 2.5–4.9)
PHOSPHORUS: 8.3 MG/DL (ref 2.5–4.9)
PLATELET # BLD: 116 K/UL (ref 135–450)
PLATELET # BLD: 125 K/UL (ref 135–450)
PLATELET # BLD: 128 K/UL (ref 135–450)
PLATELET # BLD: 158 K/UL (ref 135–450)
PLATELET # BLD: 165 K/UL (ref 135–450)
PLATELET # BLD: 171 K/UL (ref 135–450)
PLATELET # BLD: 193 K/UL (ref 135–450)
PLATELET # BLD: 203 K/UL (ref 135–450)
PLATELET # BLD: 230 K/UL (ref 135–450)
PLATELET # BLD: 62 K/UL (ref 135–450)
PLATELET # BLD: 62 K/UL (ref 135–450)
PLATELET # BLD: 70 K/UL (ref 135–450)
PLATELET SLIDE REVIEW: ABNORMAL
PLATELET SLIDE REVIEW: ADEQUATE
PMV BLD AUTO: 10 FL (ref 5–10.5)
PMV BLD AUTO: 10.2 FL (ref 5–10.5)
PMV BLD AUTO: 10.8 FL (ref 5–10.5)
PMV BLD AUTO: 9.4 FL (ref 5–10.5)
PMV BLD AUTO: 9.5 FL (ref 5–10.5)
PMV BLD AUTO: 9.6 FL (ref 5–10.5)
PMV BLD AUTO: 9.6 FL (ref 5–10.5)
PMV BLD AUTO: 9.8 FL (ref 5–10.5)
PMV BLD AUTO: 9.9 FL (ref 5–10.5)
PO2 ARTERIAL: 105.6 MM HG (ref 75–108)
PO2 ARTERIAL: 112 MMHG (ref 75–108)
PO2 ARTERIAL: 49.6 MM HG (ref 75–108)
PO2 ARTERIAL: 52.8 MM HG (ref 75–108)
PO2 ARTERIAL: 56.5 MM HG (ref 75–108)
PO2 ARTERIAL: 60.1 MM HG (ref 75–108)
PO2 ARTERIAL: 60.8 MMHG (ref 75–108)
PO2 ARTERIAL: 64.4 MM HG (ref 75–108)
PO2 ARTERIAL: 65.1 MM HG (ref 75–108)
PO2 ARTERIAL: 66 MM HG (ref 75–108)
PO2 ARTERIAL: 67.1 MMHG (ref 75–108)
PO2 ARTERIAL: 67.4 MMHG (ref 75–108)
PO2 ARTERIAL: 67.8 MMHG (ref 75–108)
PO2 ARTERIAL: 68.4 MM HG (ref 75–108)
PO2 ARTERIAL: 70.5 MM HG (ref 75–108)
PO2 ARTERIAL: 70.6 MMHG (ref 75–108)
PO2 ARTERIAL: 71.1 MMHG (ref 75–108)
PO2 ARTERIAL: 72.6 MMHG (ref 75–108)
PO2 ARTERIAL: 75.3 MMHG (ref 75–108)
PO2 ARTERIAL: 75.4 MM HG (ref 75–108)
PO2 ARTERIAL: 75.8 MMHG (ref 75–108)
PO2 ARTERIAL: 76.5 MMHG (ref 75–108)
PO2 ARTERIAL: 78 MMHG (ref 75–108)
PO2 ARTERIAL: 79.3 MMHG (ref 75–108)
PO2 ARTERIAL: 81.1 MMHG (ref 75–108)
PO2 ARTERIAL: 81.2 MMHG (ref 75–108)
PO2 ARTERIAL: 83.6 MM HG (ref 75–108)
PO2 ARTERIAL: 85.7 MMHG (ref 75–108)
PO2 ARTERIAL: 86.5 MMHG (ref 75–108)
PO2 ARTERIAL: 97.1 MMHG (ref 75–108)
POC POTASSIUM: 4.8 MMOL/L (ref 3.5–5.1)
POC POTASSIUM: 5.1 MMOL/L (ref 3.5–5.1)
POC POTASSIUM: 5.2 MMOL/L (ref 3.5–5.1)
POC POTASSIUM: 5.6 MMOL/L (ref 3.5–5.1)
POC POTASSIUM: 5.8 MMOL/L (ref 3.5–5.1)
POC POTASSIUM: 5.8 MMOL/L (ref 3.5–5.1)
POC POTASSIUM: 6 MMOL/L (ref 3.5–5.1)
POC SAMPLE TYPE: ABNORMAL
POC SODIUM: 144 MMOL/L (ref 136–145)
POC SODIUM: 144 MMOL/L (ref 136–145)
POC SODIUM: 145 MMOL/L (ref 136–145)
POC SODIUM: 146 MMOL/L (ref 136–145)
POC SODIUM: 151 MMOL/L (ref 136–145)
POTASSIUM REFLEX MAGNESIUM: 5.1 MMOL/L (ref 3.5–5.1)
POTASSIUM REFLEX MAGNESIUM: 5.7 MMOL/L (ref 3.5–5.1)
POTASSIUM REFLEX MAGNESIUM: 5.7 MMOL/L (ref 3.5–5.1)
POTASSIUM REFLEX MAGNESIUM: 6.1 MMOL/L (ref 3.5–5.1)
POTASSIUM SERPL-SCNC: 4.7 MMOL/L (ref 3.5–5.1)
POTASSIUM SERPL-SCNC: 5.1 MMOL/L (ref 3.5–5.1)
POTASSIUM SERPL-SCNC: 5.1 MMOL/L (ref 3.5–5.1)
POTASSIUM SERPL-SCNC: 5.2 MMOL/L (ref 3.5–5.1)
POTASSIUM SERPL-SCNC: 5.3 MMOL/L (ref 3.5–5.1)
POTASSIUM SERPL-SCNC: 5.3 MMOL/L (ref 3.5–5.1)
POTASSIUM SERPL-SCNC: 5.4 MMOL/L (ref 3.5–5.1)
POTASSIUM SERPL-SCNC: 5.4 MMOL/L (ref 3.5–5.1)
POTASSIUM SERPL-SCNC: 5.5 MMOL/L (ref 3.5–5.1)
POTASSIUM SERPL-SCNC: 5.5 MMOL/L (ref 3.5–5.1)
POTASSIUM SERPL-SCNC: 5.6 MMOL/L (ref 3.5–5.1)
POTASSIUM SERPL-SCNC: 5.7 MMOL/L (ref 3.5–5.1)
POTASSIUM SERPL-SCNC: 5.8 MMOL/L (ref 3.5–5.1)
POTASSIUM SERPL-SCNC: 5.9 MMOL/L (ref 3.5–5.1)
POTASSIUM SERPL-SCNC: 6.2 MMOL/L (ref 3.5–5.1)
POTASSIUM SERPL-SCNC: 6.2 MMOL/L (ref 3.5–5.1)
POTASSIUM SERPL-SCNC: 6.5 MMOL/L (ref 3.5–5.1)
PROCALCITONIN: 0.41 NG/ML (ref 0–0.15)
PROCALCITONIN: 1.71 NG/ML (ref 0–0.15)
PROCALCITONIN: 2.11 NG/ML (ref 0–0.15)
PROMYELOCYTES PERCENT: 1 %
PROMYELOCYTES PERCENT: 7 %
PROTEIN UA: ABNORMAL MG/DL
PROTHROMBIN TIME: 11.5 SEC (ref 9.9–12.7)
PROTHROMBIN TIME: 19.9 SEC (ref 9.9–12.7)
RBC # BLD: 2.99 M/UL (ref 4.2–5.9)
RBC # BLD: 3.35 M/UL (ref 4.2–5.9)
RBC # BLD: 3.39 M/UL (ref 4.2–5.9)
RBC # BLD: 3.45 M/UL (ref 4.2–5.9)
RBC # BLD: 3.49 M/UL (ref 4.2–5.9)
RBC # BLD: 3.75 M/UL (ref 4.2–5.9)
RBC # BLD: 3.77 M/UL (ref 4.2–5.9)
RBC # BLD: 3.79 M/UL (ref 4.2–5.9)
RBC # BLD: 3.85 M/UL (ref 4.2–5.9)
RBC # BLD: 3.98 M/UL (ref 4.2–5.9)
RBC # BLD: 4.39 M/UL (ref 4.2–5.9)
RBC # BLD: NORMAL 10*6/UL
RBC UA: >100 /HPF (ref 0–4)
SARS-COV-2: POSITIVE
SODIUM BLD-SCNC: 128 MMOL/L (ref 136–145)
SODIUM BLD-SCNC: 130 MMOL/L (ref 136–145)
SODIUM BLD-SCNC: 135 MMOL/L (ref 136–145)
SODIUM BLD-SCNC: 135 MMOL/L (ref 136–145)
SODIUM BLD-SCNC: 139 MMOL/L (ref 136–145)
SODIUM BLD-SCNC: 140 MMOL/L (ref 136–145)
SODIUM BLD-SCNC: 141 MMOL/L (ref 136–145)
SODIUM BLD-SCNC: 142 MMOL/L (ref 136–145)
SODIUM BLD-SCNC: 143 MMOL/L (ref 136–145)
SODIUM BLD-SCNC: 144 MMOL/L (ref 136–145)
SODIUM BLD-SCNC: 144 MMOL/L (ref 136–145)
SODIUM BLD-SCNC: 145 MMOL/L (ref 136–145)
SODIUM BLD-SCNC: 147 MMOL/L (ref 136–145)
SODIUM BLD-SCNC: 148 MMOL/L (ref 136–145)
SODIUM BLD-SCNC: 148 MMOL/L (ref 136–145)
SODIUM BLD-SCNC: 149 MMOL/L (ref 136–145)
SPECIFIC GRAVITY UA: 1.02 (ref 1–1.03)
TCO2 ARTERIAL: 21 MMOL/L
TCO2 ARTERIAL: 22 MMOL/L
TCO2 ARTERIAL: 23 MMOL/L
TCO2 ARTERIAL: 26 MMOL/L
TCO2 ARTERIAL: 27 MMOL/L
TCO2 ARTERIAL: 28 MMOL/L
TCO2 ARTERIAL: 28 MMOL/L
TCO2 ARTERIAL: 30 MMOL/L
TCO2 ARTERIAL: 31 MMOL/L
TCO2 ARTERIAL: 31 MMOL/L
TCO2 ARTERIAL: 33 MMOL/L
TCO2 ARTERIAL: 33 MMOL/L
TCO2 ARTERIAL: 34 MMOL/L
TCO2 ARTERIAL: 34 MMOL/L
TCO2 ARTERIAL: 35 MMOL/L
TCO2 ARTERIAL: 35 MMOL/L
TCO2 ARTERIAL: 36 MMOL/L
TOTAL CK: 179 U/L (ref 39–308)
TOTAL PROTEIN: 5.4 G/DL (ref 6.4–8.2)
TOTAL PROTEIN: 5.4 G/DL (ref 6.4–8.2)
TOTAL PROTEIN: 5.5 G/DL (ref 6.4–8.2)
TOTAL PROTEIN: 5.6 G/DL (ref 6.4–8.2)
TOTAL PROTEIN: 5.7 G/DL (ref 6.4–8.2)
TOTAL PROTEIN: 5.7 G/DL (ref 6.4–8.2)
TOTAL PROTEIN: 5.8 G/DL (ref 6.4–8.2)
TOTAL PROTEIN: 5.9 G/DL (ref 6.4–8.2)
TOTAL PROTEIN: 6 G/DL (ref 6.4–8.2)
TOTAL PROTEIN: 6.1 G/DL (ref 6.4–8.2)
TOTAL PROTEIN: 6.1 G/DL (ref 6.4–8.2)
TRIGL SERPL-MCNC: 391 MG/DL (ref 0–150)
TRIGL SERPL-MCNC: 392 MG/DL (ref 0–150)
URINE REFLEX TO CULTURE: ABNORMAL
URINE TYPE: ABNORMAL
UROBILINOGEN, URINE: 0.2 E.U./DL
VANCOMYCIN RANDOM: 13.1 UG/ML
VANCOMYCIN RANDOM: 13.2 UG/ML
VANCOMYCIN RANDOM: 15.4 UG/ML
VANCOMYCIN RANDOM: 16.2 UG/ML
VANCOMYCIN RANDOM: 17.5 UG/ML
VANCOMYCIN RANDOM: 20.4 UG/ML
VANCOMYCIN TROUGH: 23.6 UG/ML (ref 10–20)
WBC # BLD: 16.8 K/UL (ref 4–11)
WBC # BLD: 17.4 K/UL (ref 4–11)
WBC # BLD: 18.7 K/UL (ref 4–11)
WBC # BLD: 20.1 K/UL (ref 4–11)
WBC # BLD: 20.1 K/UL (ref 4–11)
WBC # BLD: 21 K/UL (ref 4–11)
WBC # BLD: 21.2 K/UL (ref 4–11)
WBC # BLD: 21.4 K/UL (ref 4–11)
WBC # BLD: 29.6 K/UL (ref 4–11)
WBC # BLD: 39.5 K/UL (ref 4–11)
WBC # BLD: 47.6 K/UL (ref 4–11)
WBC UA: ABNORMAL /HPF (ref 0–5)

## 2021-01-01 PROCEDURE — 99233 SBSQ HOSP IP/OBS HIGH 50: CPT | Performed by: INTERNAL MEDICINE

## 2021-01-01 PROCEDURE — 85520 HEPARIN ASSAY: CPT

## 2021-01-01 PROCEDURE — 85025 COMPLETE CBC W/AUTO DIFF WBC: CPT

## 2021-01-01 PROCEDURE — 6360000002 HC RX W HCPCS

## 2021-01-01 PROCEDURE — 6360000002 HC RX W HCPCS: Performed by: STUDENT IN AN ORGANIZED HEALTH CARE EDUCATION/TRAINING PROGRAM

## 2021-01-01 PROCEDURE — 36415 COLL VENOUS BLD VENIPUNCTURE: CPT

## 2021-01-01 PROCEDURE — 71045 X-RAY EXAM CHEST 1 VIEW: CPT

## 2021-01-01 PROCEDURE — 6370000000 HC RX 637 (ALT 250 FOR IP): Performed by: STUDENT IN AN ORGANIZED HEALTH CARE EDUCATION/TRAINING PROGRAM

## 2021-01-01 PROCEDURE — 2500000003 HC RX 250 WO HCPCS: Performed by: HOSPITALIST

## 2021-01-01 PROCEDURE — 80069 RENAL FUNCTION PANEL: CPT

## 2021-01-01 PROCEDURE — 2500000003 HC RX 250 WO HCPCS

## 2021-01-01 PROCEDURE — 82803 BLOOD GASES ANY COMBINATION: CPT

## 2021-01-01 PROCEDURE — 84145 PROCALCITONIN (PCT): CPT

## 2021-01-01 PROCEDURE — 2500000003 HC RX 250 WO HCPCS: Performed by: STUDENT IN AN ORGANIZED HEALTH CARE EDUCATION/TRAINING PROGRAM

## 2021-01-01 PROCEDURE — 2000000000 HC ICU R&B

## 2021-01-01 PROCEDURE — 2580000003 HC RX 258: Performed by: STUDENT IN AN ORGANIZED HEALTH CARE EDUCATION/TRAINING PROGRAM

## 2021-01-01 PROCEDURE — 6360000002 HC RX W HCPCS: Performed by: HOSPITALIST

## 2021-01-01 PROCEDURE — 2580000003 HC RX 258

## 2021-01-01 PROCEDURE — 2700000000 HC OXYGEN THERAPY PER DAY

## 2021-01-01 PROCEDURE — 80053 COMPREHEN METABOLIC PANEL: CPT

## 2021-01-01 PROCEDURE — 84100 ASSAY OF PHOSPHORUS: CPT

## 2021-01-01 PROCEDURE — 6360000002 HC RX W HCPCS: Performed by: INTERNAL MEDICINE

## 2021-01-01 PROCEDURE — 94003 VENT MGMT INPAT SUBQ DAY: CPT

## 2021-01-01 PROCEDURE — 94761 N-INVAS EAR/PLS OXIMETRY MLT: CPT

## 2021-01-01 PROCEDURE — 5A1955Z RESPIRATORY VENTILATION, GREATER THAN 96 CONSECUTIVE HOURS: ICD-10-PCS | Performed by: INTERNAL MEDICINE

## 2021-01-01 PROCEDURE — 83735 ASSAY OF MAGNESIUM: CPT

## 2021-01-01 PROCEDURE — 2580000003 HC RX 258: Performed by: INTERNAL MEDICINE

## 2021-01-01 PROCEDURE — 37799 UNLISTED PX VASCULAR SURGERY: CPT

## 2021-01-01 PROCEDURE — 84132 ASSAY OF SERUM POTASSIUM: CPT

## 2021-01-01 PROCEDURE — 93010 ELECTROCARDIOGRAM REPORT: CPT | Performed by: INTERNAL MEDICINE

## 2021-01-01 PROCEDURE — 86140 C-REACTIVE PROTEIN: CPT

## 2021-01-01 PROCEDURE — 80076 HEPATIC FUNCTION PANEL: CPT

## 2021-01-01 PROCEDURE — 99291 CRITICAL CARE FIRST HOUR: CPT | Performed by: INTERNAL MEDICINE

## 2021-01-01 PROCEDURE — 82330 ASSAY OF CALCIUM: CPT

## 2021-01-01 PROCEDURE — 85027 COMPLETE CBC AUTOMATED: CPT

## 2021-01-01 PROCEDURE — 84295 ASSAY OF SERUM SODIUM: CPT

## 2021-01-01 PROCEDURE — 90945 DIALYSIS ONE EVALUATION: CPT | Performed by: INTERNAL MEDICINE

## 2021-01-01 PROCEDURE — 80202 ASSAY OF VANCOMYCIN: CPT

## 2021-01-01 PROCEDURE — 6370000000 HC RX 637 (ALT 250 FOR IP)

## 2021-01-01 PROCEDURE — 87040 BLOOD CULTURE FOR BACTERIA: CPT

## 2021-01-01 PROCEDURE — 85610 PROTHROMBIN TIME: CPT

## 2021-01-01 PROCEDURE — 93005 ELECTROCARDIOGRAM TRACING: CPT | Performed by: STUDENT IN AN ORGANIZED HEALTH CARE EDUCATION/TRAINING PROGRAM

## 2021-01-01 PROCEDURE — 6370000000 HC RX 637 (ALT 250 FOR IP): Performed by: INTERNAL MEDICINE

## 2021-01-01 PROCEDURE — 84478 ASSAY OF TRIGLYCERIDES: CPT

## 2021-01-01 PROCEDURE — 85384 FIBRINOGEN ACTIVITY: CPT

## 2021-01-01 PROCEDURE — 6360000004 HC RX CONTRAST MEDICATION: Performed by: INTERNAL MEDICINE

## 2021-01-01 PROCEDURE — 94002 VENT MGMT INPAT INIT DAY: CPT

## 2021-01-01 PROCEDURE — C8929 TTE W OR WO FOL WCON,DOPPLER: HCPCS

## 2021-01-01 PROCEDURE — 2580000003 HC RX 258: Performed by: HOSPITALIST

## 2021-01-01 PROCEDURE — 36556 INSERT NON-TUNNEL CV CATH: CPT

## 2021-01-01 PROCEDURE — 85379 FIBRIN DEGRADATION QUANT: CPT

## 2021-01-01 PROCEDURE — 74018 RADEX ABDOMEN 1 VIEW: CPT

## 2021-01-01 PROCEDURE — 82947 ASSAY GLUCOSE BLOOD QUANT: CPT

## 2021-01-01 PROCEDURE — 99223 1ST HOSP IP/OBS HIGH 75: CPT | Performed by: INTERNAL MEDICINE

## 2021-01-01 PROCEDURE — 83036 HEMOGLOBIN GLYCOSYLATED A1C: CPT

## 2021-01-01 PROCEDURE — 83605 ASSAY OF LACTIC ACID: CPT

## 2021-01-01 PROCEDURE — 90945 DIALYSIS ONE EVALUATION: CPT

## 2021-01-01 PROCEDURE — 36556 INSERT NON-TUNNEL CV CATH: CPT | Performed by: SURGERY

## 2021-01-01 PROCEDURE — 85730 THROMBOPLASTIN TIME PARTIAL: CPT

## 2021-01-01 PROCEDURE — 3E0333Z INTRODUCTION OF ANTI-INFLAMMATORY INTO PERIPHERAL VEIN, PERCUTANEOUS APPROACH: ICD-10-PCS | Performed by: INTERNAL MEDICINE

## 2021-01-01 PROCEDURE — 82550 ASSAY OF CK (CPK): CPT

## 2021-01-01 PROCEDURE — 5A1D90Z PERFORMANCE OF URINARY FILTRATION, CONTINUOUS, GREATER THAN 18 HOURS PER DAY: ICD-10-PCS | Performed by: INTERNAL MEDICINE

## 2021-01-01 PROCEDURE — 02HV33Z INSERTION OF INFUSION DEVICE INTO SUPERIOR VENA CAVA, PERCUTANEOUS APPROACH: ICD-10-PCS | Performed by: STUDENT IN AN ORGANIZED HEALTH CARE EDUCATION/TRAINING PROGRAM

## 2021-01-01 PROCEDURE — 99221 1ST HOSP IP/OBS SF/LOW 40: CPT | Performed by: NURSE PRACTITIONER

## 2021-01-01 PROCEDURE — 85049 AUTOMATED PLATELET COUNT: CPT

## 2021-01-01 PROCEDURE — 81001 URINALYSIS AUTO W/SCOPE: CPT

## 2021-01-01 RX ORDER — POLYETHYLENE GLYCOL 3350 17 G/17G
17 POWDER, FOR SOLUTION ORAL DAILY PRN
Status: DISCONTINUED | OUTPATIENT
Start: 2021-01-01 | End: 2021-01-01 | Stop reason: HOSPADM

## 2021-01-01 RX ORDER — HEPARIN SODIUM 1000 [USP'U]/ML
80 INJECTION, SOLUTION INTRAVENOUS; SUBCUTANEOUS PRN
Status: DISCONTINUED | OUTPATIENT
Start: 2021-01-01 | End: 2021-01-01 | Stop reason: HOSPADM

## 2021-01-01 RX ORDER — ACETAMINOPHEN 500 MG
1000 TABLET ORAL EVERY 6 HOURS PRN
Status: DISCONTINUED | OUTPATIENT
Start: 2021-01-01 | End: 2021-01-01 | Stop reason: HOSPADM

## 2021-01-01 RX ORDER — FAMOTIDINE 20 MG/1
20 TABLET, FILM COATED ORAL DAILY
Status: DISCONTINUED | OUTPATIENT
Start: 2021-01-01 | End: 2021-01-01 | Stop reason: HOSPADM

## 2021-01-01 RX ORDER — GLYCOPYRROLATE 0.2 MG/ML
0.2 INJECTION INTRAMUSCULAR; INTRAVENOUS EVERY 4 HOURS PRN
Status: DISCONTINUED | OUTPATIENT
Start: 2021-01-01 | End: 2021-01-01 | Stop reason: HOSPADM

## 2021-01-01 RX ORDER — SODIUM CHLORIDE 0.9 % (FLUSH) 0.9 %
5-40 SYRINGE (ML) INJECTION PRN
Status: DISCONTINUED | OUTPATIENT
Start: 2021-01-01 | End: 2021-01-01 | Stop reason: HOSPADM

## 2021-01-01 RX ORDER — LORAZEPAM 2 MG/ML
1 INJECTION INTRAMUSCULAR EVERY 30 MIN PRN
Status: DISCONTINUED | OUTPATIENT
Start: 2021-01-01 | End: 2021-01-01 | Stop reason: HOSPADM

## 2021-01-01 RX ORDER — DEXAMETHASONE SODIUM PHOSPHATE 4 MG/ML
6 INJECTION, SOLUTION INTRA-ARTICULAR; INTRALESIONAL; INTRAMUSCULAR; INTRAVENOUS; SOFT TISSUE DAILY
Status: DISCONTINUED | OUTPATIENT
Start: 2021-01-01 | End: 2021-01-01

## 2021-01-01 RX ORDER — ONDANSETRON 4 MG/1
4 TABLET, ORALLY DISINTEGRATING ORAL EVERY 8 HOURS PRN
Status: DISCONTINUED | OUTPATIENT
Start: 2021-01-01 | End: 2021-01-01 | Stop reason: HOSPADM

## 2021-01-01 RX ORDER — ACETAMINOPHEN 650 MG/1
650 SUPPOSITORY RECTAL EVERY 6 HOURS PRN
Status: DISCONTINUED | OUTPATIENT
Start: 2021-01-01 | End: 2021-01-01

## 2021-01-01 RX ORDER — ACETAMINOPHEN 650 MG/1
650 SUPPOSITORY RECTAL EVERY 6 HOURS PRN
Status: DISCONTINUED | OUTPATIENT
Start: 2021-01-01 | End: 2021-01-01 | Stop reason: HOSPADM

## 2021-01-01 RX ORDER — DEXTROSE MONOHYDRATE 50 MG/ML
100 INJECTION, SOLUTION INTRAVENOUS PRN
Status: DISCONTINUED | OUTPATIENT
Start: 2021-01-01 | End: 2021-01-01 | Stop reason: HOSPADM

## 2021-01-01 RX ORDER — SODIUM CHLORIDE, SODIUM LACTATE, POTASSIUM CHLORIDE, AND CALCIUM CHLORIDE .6; .31; .03; .02 G/100ML; G/100ML; G/100ML; G/100ML
1000 INJECTION, SOLUTION INTRAVENOUS ONCE
Status: COMPLETED | OUTPATIENT
Start: 2021-01-01 | End: 2021-01-01

## 2021-01-01 RX ORDER — HEPARIN SODIUM 1000 [USP'U]/ML
2800 INJECTION, SOLUTION INTRAVENOUS; SUBCUTANEOUS PRN
Status: DISCONTINUED | OUTPATIENT
Start: 2021-01-01 | End: 2021-01-01 | Stop reason: HOSPADM

## 2021-01-01 RX ORDER — HEPARIN SODIUM 1000 [USP'U]/ML
60 INJECTION, SOLUTION INTRAVENOUS; SUBCUTANEOUS PRN
Status: DISCONTINUED | OUTPATIENT
Start: 2021-01-01 | End: 2021-01-01

## 2021-01-01 RX ORDER — SENNOSIDES 8.8 MG/5ML
5 LIQUID ORAL NIGHTLY
Status: DISCONTINUED | OUTPATIENT
Start: 2021-01-01 | End: 2021-01-01 | Stop reason: HOSPADM

## 2021-01-01 RX ORDER — INSULIN LISPRO 100 [IU]/ML
0-18 INJECTION, SOLUTION INTRAVENOUS; SUBCUTANEOUS EVERY 6 HOURS
Status: DISCONTINUED | OUTPATIENT
Start: 2021-01-01 | End: 2021-01-01 | Stop reason: HOSPADM

## 2021-01-01 RX ORDER — DEXTROSE MONOHYDRATE 25 G/50ML
12.5 INJECTION, SOLUTION INTRAVENOUS PRN
Status: DISCONTINUED | OUTPATIENT
Start: 2021-01-01 | End: 2021-01-01

## 2021-01-01 RX ORDER — DEXTROSE MONOHYDRATE 25 G/50ML
12.5 INJECTION, SOLUTION INTRAVENOUS PRN
Status: DISCONTINUED | OUTPATIENT
Start: 2021-01-01 | End: 2021-01-01 | Stop reason: HOSPADM

## 2021-01-01 RX ORDER — HEPARIN SODIUM 1000 [USP'U]/ML
60 INJECTION, SOLUTION INTRAVENOUS; SUBCUTANEOUS ONCE
Status: DISCONTINUED | OUTPATIENT
Start: 2021-01-01 | End: 2021-01-01

## 2021-01-01 RX ORDER — INSULIN LISPRO 100 [IU]/ML
0-6 INJECTION, SOLUTION INTRAVENOUS; SUBCUTANEOUS 4 TIMES DAILY
Status: DISCONTINUED | OUTPATIENT
Start: 2021-01-01 | End: 2021-01-01

## 2021-01-01 RX ORDER — NICOTINE POLACRILEX 4 MG
15 LOZENGE BUCCAL PRN
Status: DISCONTINUED | OUTPATIENT
Start: 2021-01-01 | End: 2021-01-01

## 2021-01-01 RX ORDER — DEXTROSE MONOHYDRATE 25 G/50ML
25 INJECTION, SOLUTION INTRAVENOUS ONCE
Status: COMPLETED | OUTPATIENT
Start: 2021-01-01 | End: 2021-01-01

## 2021-01-01 RX ORDER — HEPARIN SODIUM 1000 [USP'U]/ML
1000 INJECTION, SOLUTION INTRAVENOUS; SUBCUTANEOUS ONCE
Status: DISCONTINUED | OUTPATIENT
Start: 2021-01-01 | End: 2021-01-01 | Stop reason: HOSPADM

## 2021-01-01 RX ORDER — POLYETHYLENE GLYCOL 3350 17 G/17G
17 POWDER, FOR SOLUTION ORAL 2 TIMES DAILY
Status: DISCONTINUED | OUTPATIENT
Start: 2021-01-01 | End: 2021-01-01 | Stop reason: HOSPADM

## 2021-01-01 RX ORDER — NICOTINE POLACRILEX 4 MG
15 LOZENGE BUCCAL PRN
Status: DISCONTINUED | OUTPATIENT
Start: 2021-01-01 | End: 2021-01-01 | Stop reason: SDUPTHER

## 2021-01-01 RX ORDER — HEPARIN SODIUM 10000 [USP'U]/100ML
5-30 INJECTION, SOLUTION INTRAVENOUS CONTINUOUS
Status: DISCONTINUED | OUTPATIENT
Start: 2021-01-01 | End: 2021-01-01

## 2021-01-01 RX ORDER — ACETAMINOPHEN 325 MG/1
650 TABLET ORAL EVERY 6 HOURS PRN
Status: DISCONTINUED | OUTPATIENT
Start: 2021-01-01 | End: 2021-01-01

## 2021-01-01 RX ORDER — HEPARIN SODIUM 1000 [USP'U]/ML
30 INJECTION, SOLUTION INTRAVENOUS; SUBCUTANEOUS PRN
Status: DISCONTINUED | OUTPATIENT
Start: 2021-01-01 | End: 2021-01-01

## 2021-01-01 RX ORDER — SODIUM CHLORIDE 0.9 % (FLUSH) 0.9 %
5-40 SYRINGE (ML) INJECTION EVERY 12 HOURS SCHEDULED
Status: DISCONTINUED | OUTPATIENT
Start: 2021-01-01 | End: 2021-01-01 | Stop reason: HOSPADM

## 2021-01-01 RX ORDER — POTASSIUM CHLORIDE 29.8 MG/ML
20 INJECTION INTRAVENOUS PRN
Status: DISCONTINUED | OUTPATIENT
Start: 2021-01-01 | End: 2021-01-01 | Stop reason: HOSPADM

## 2021-01-01 RX ORDER — DEXAMETHASONE SODIUM PHOSPHATE 4 MG/ML
10 INJECTION, SOLUTION INTRA-ARTICULAR; INTRALESIONAL; INTRAMUSCULAR; INTRAVENOUS; SOFT TISSUE EVERY 24 HOURS
Status: DISCONTINUED | OUTPATIENT
Start: 2021-01-01 | End: 2021-01-01 | Stop reason: HOSPADM

## 2021-01-01 RX ORDER — PROPOFOL 10 MG/ML
5-50 INJECTION, EMULSION INTRAVENOUS
Status: DISCONTINUED | OUTPATIENT
Start: 2021-01-01 | End: 2021-01-01 | Stop reason: HOSPADM

## 2021-01-01 RX ORDER — HEPARIN SODIUM 10000 [USP'U]/100ML
5-30 INJECTION, SOLUTION INTRAVENOUS CONTINUOUS
Status: DISCONTINUED | OUTPATIENT
Start: 2021-01-01 | End: 2021-01-01 | Stop reason: HOSPADM

## 2021-01-01 RX ORDER — DEXTROSE MONOHYDRATE 50 MG/ML
100 INJECTION, SOLUTION INTRAVENOUS PRN
Status: DISCONTINUED | OUTPATIENT
Start: 2021-01-01 | End: 2021-01-01

## 2021-01-01 RX ORDER — HEPARIN SODIUM 1000 [USP'U]/ML
80 INJECTION, SOLUTION INTRAVENOUS; SUBCUTANEOUS ONCE
Status: DISCONTINUED | OUTPATIENT
Start: 2021-01-01 | End: 2021-01-01

## 2021-01-01 RX ORDER — MAGNESIUM SULFATE 1 G/100ML
1000 INJECTION INTRAVENOUS PRN
Status: DISCONTINUED | OUTPATIENT
Start: 2021-01-01 | End: 2021-01-01 | Stop reason: HOSPADM

## 2021-01-01 RX ORDER — DEXTROSE MONOHYDRATE 25 G/50ML
12.5 INJECTION, SOLUTION INTRAVENOUS PRN
Status: DISCONTINUED | OUTPATIENT
Start: 2021-01-01 | End: 2021-01-01 | Stop reason: SDUPTHER

## 2021-01-01 RX ORDER — HEPARIN SODIUM 1000 [USP'U]/ML
40 INJECTION, SOLUTION INTRAVENOUS; SUBCUTANEOUS PRN
Status: DISCONTINUED | OUTPATIENT
Start: 2021-01-01 | End: 2021-01-01 | Stop reason: HOSPADM

## 2021-01-01 RX ORDER — NICOTINE POLACRILEX 4 MG
15 LOZENGE BUCCAL PRN
Status: DISCONTINUED | OUTPATIENT
Start: 2021-01-01 | End: 2021-01-01 | Stop reason: HOSPADM

## 2021-01-01 RX ORDER — ONDANSETRON 2 MG/ML
4 INJECTION INTRAMUSCULAR; INTRAVENOUS EVERY 6 HOURS PRN
Status: DISCONTINUED | OUTPATIENT
Start: 2021-01-01 | End: 2021-01-01 | Stop reason: HOSPADM

## 2021-01-01 RX ORDER — SODIUM CHLORIDE, SODIUM LACTATE, POTASSIUM CHLORIDE, CALCIUM CHLORIDE 600; 310; 30; 20 MG/100ML; MG/100ML; MG/100ML; MG/100ML
INJECTION, SOLUTION INTRAVENOUS CONTINUOUS
Status: ACTIVE | OUTPATIENT
Start: 2021-01-01 | End: 2021-01-01

## 2021-01-01 RX ORDER — SODIUM CHLORIDE 9 MG/ML
25 INJECTION, SOLUTION INTRAVENOUS PRN
Status: DISCONTINUED | OUTPATIENT
Start: 2021-01-01 | End: 2021-01-01 | Stop reason: HOSPADM

## 2021-01-01 RX ORDER — DEXTROSE MONOHYDRATE 50 MG/ML
100 INJECTION, SOLUTION INTRAVENOUS PRN
Status: DISCONTINUED | OUTPATIENT
Start: 2021-01-01 | End: 2021-01-01 | Stop reason: SDUPTHER

## 2021-01-01 RX ADMIN — SODIUM CHLORIDE, PRESERVATIVE FREE 10 ML: 5 INJECTION INTRAVENOUS at 21:13

## 2021-01-01 RX ADMIN — ACETAMINOPHEN 650 MG: 325 TABLET ORAL at 18:04

## 2021-01-01 RX ADMIN — MEROPENEM 1000 MG: 1 INJECTION, POWDER, FOR SOLUTION INTRAVENOUS at 22:12

## 2021-01-01 RX ADMIN — POLYETHYLENE GLYCOL 3350 17 G: 17 POWDER, FOR SOLUTION ORAL at 08:01

## 2021-01-01 RX ADMIN — Medication 200 MCG/HR: at 11:10

## 2021-01-01 RX ADMIN — FAMOTIDINE 20 MG: 20 TABLET ORAL at 08:13

## 2021-01-01 RX ADMIN — POLYETHYLENE GLYCOL 3350 17 G: 17 POWDER, FOR SOLUTION ORAL at 20:53

## 2021-01-01 RX ADMIN — PROPOFOL 50 MCG/KG/MIN: 10 INJECTION, EMULSION INTRAVENOUS at 04:04

## 2021-01-01 RX ADMIN — VASOPRESSIN 0.04 UNITS/MIN: 20 INJECTION INTRAVENOUS at 08:36

## 2021-01-01 RX ADMIN — Medication 8.8 MG: at 20:14

## 2021-01-01 RX ADMIN — PROPOFOL 50 MCG/KG/MIN: 10 INJECTION, EMULSION INTRAVENOUS at 22:36

## 2021-01-01 RX ADMIN — PROPOFOL 50 MCG/KG/MIN: 10 INJECTION, EMULSION INTRAVENOUS at 05:45

## 2021-01-01 RX ADMIN — Medication 200 MCG/HR: at 01:33

## 2021-01-01 RX ADMIN — Medication: at 01:48

## 2021-01-01 RX ADMIN — SODIUM ZIRCONIUM CYCLOSILICATE 10 G: 10 POWDER, FOR SUSPENSION ORAL at 08:58

## 2021-01-01 RX ADMIN — Medication 150 MCG/HR: at 07:50

## 2021-01-01 RX ADMIN — CALCIUM GLUCONATE 1000 MG: 98 INJECTION, SOLUTION INTRAVENOUS at 06:46

## 2021-01-01 RX ADMIN — CALCIUM GLUCONATE 1000 MG: 98 INJECTION, SOLUTION INTRAVENOUS at 07:51

## 2021-01-01 RX ADMIN — PROPOFOL 50 MCG/KG/MIN: 10 INJECTION, EMULSION INTRAVENOUS at 00:13

## 2021-01-01 RX ADMIN — FAMOTIDINE 20 MG: 20 TABLET ORAL at 08:01

## 2021-01-01 RX ADMIN — HEPARIN SODIUM 13 UNITS/KG/HR: 10000 INJECTION, SOLUTION INTRAVENOUS at 15:55

## 2021-01-01 RX ADMIN — CISATRACURIUM BESYLATE 2 MCG/KG/MIN: 10 INJECTION INTRAVENOUS at 10:58

## 2021-01-01 RX ADMIN — VECURONIUM BROMIDE 0.5 MCG/KG/MIN: 1 INJECTION, POWDER, LYOPHILIZED, FOR SOLUTION INTRAVENOUS at 09:53

## 2021-01-01 RX ADMIN — MEROPENEM 500 MG: 500 INJECTION, POWDER, FOR SOLUTION INTRAVENOUS at 16:15

## 2021-01-01 RX ADMIN — PROPOFOL 50 MCG/KG/MIN: 10 INJECTION, EMULSION INTRAVENOUS at 10:59

## 2021-01-01 RX ADMIN — VECURONIUM BROMIDE 0.6 MCG/KG/MIN: 1 INJECTION, POWDER, LYOPHILIZED, FOR SOLUTION INTRAVENOUS at 02:27

## 2021-01-01 RX ADMIN — INSULIN LISPRO 3 UNITS: 100 INJECTION, SOLUTION INTRAVENOUS; SUBCUTANEOUS at 16:09

## 2021-01-01 RX ADMIN — Medication: at 03:04

## 2021-01-01 RX ADMIN — DEXAMETHASONE SODIUM PHOSPHATE 20 MG: 4 INJECTION, SOLUTION INTRAMUSCULAR; INTRAVENOUS at 11:42

## 2021-01-01 RX ADMIN — PROPOFOL 50 MCG/KG/MIN: 10 INJECTION, EMULSION INTRAVENOUS at 21:24

## 2021-01-01 RX ADMIN — Medication 200 MCG/HR: at 00:51

## 2021-01-01 RX ADMIN — SODIUM CHLORIDE, PRESERVATIVE FREE 10 ML: 5 INJECTION INTRAVENOUS at 08:20

## 2021-01-01 RX ADMIN — DEXTROSE MONOHYDRATE 25 G: 25 INJECTION, SOLUTION INTRAVENOUS at 07:33

## 2021-01-01 RX ADMIN — PROPOFOL 50 MCG/KG/MIN: 10 INJECTION, EMULSION INTRAVENOUS at 12:41

## 2021-01-01 RX ADMIN — INSULIN LISPRO 6 UNITS: 100 INJECTION, SOLUTION INTRAVENOUS; SUBCUTANEOUS at 05:22

## 2021-01-01 RX ADMIN — DEXTROSE MONOHYDRATE 25 G: 25 INJECTION, SOLUTION INTRAVENOUS at 06:34

## 2021-01-01 RX ADMIN — VANCOMYCIN HYDROCHLORIDE 1000 MG: 10 INJECTION, POWDER, LYOPHILIZED, FOR SOLUTION INTRAVENOUS at 02:41

## 2021-01-01 RX ADMIN — PHENYLEPHRINE HYDROCHLORIDE 300 MCG/MIN: 10 INJECTION INTRAVENOUS at 23:28

## 2021-01-01 RX ADMIN — GLYCOPYRROLATE 0.2 MG: 0.2 INJECTION, SOLUTION INTRAMUSCULAR; INTRAVENOUS at 10:54

## 2021-01-01 RX ADMIN — INSULIN LISPRO 3 UNITS: 100 INJECTION, SOLUTION INTRAVENOUS; SUBCUTANEOUS at 17:06

## 2021-01-01 RX ADMIN — PHENYLEPHRINE HYDROCHLORIDE 10 MCG/MIN: 10 INJECTION INTRAVENOUS at 16:52

## 2021-01-01 RX ADMIN — PROPOFOL 50 MCG/KG/MIN: 10 INJECTION, EMULSION INTRAVENOUS at 07:51

## 2021-01-01 RX ADMIN — INSULIN LISPRO 3 UNITS: 100 INJECTION, SOLUTION INTRAVENOUS; SUBCUTANEOUS at 13:05

## 2021-01-01 RX ADMIN — SODIUM CHLORIDE, PRESERVATIVE FREE 10 ML: 5 INJECTION INTRAVENOUS at 08:05

## 2021-01-01 RX ADMIN — INSULIN LISPRO 3 UNITS: 100 INJECTION, SOLUTION INTRAVENOUS; SUBCUTANEOUS at 22:48

## 2021-01-01 RX ADMIN — Medication 200 MCG/HR: at 21:07

## 2021-01-01 RX ADMIN — LORAZEPAM 1 MG: 2 INJECTION INTRAMUSCULAR; INTRAVENOUS at 10:55

## 2021-01-01 RX ADMIN — PROPOFOL 50 MCG/KG/MIN: 10 INJECTION, EMULSION INTRAVENOUS at 05:01

## 2021-01-01 RX ADMIN — CALCIUM GLUCONATE 2000 MG: 98 INJECTION, SOLUTION INTRAVENOUS at 18:18

## 2021-01-01 RX ADMIN — VECURONIUM BROMIDE 0.4 MCG/KG/MIN: 1 INJECTION, POWDER, LYOPHILIZED, FOR SOLUTION INTRAVENOUS at 04:14

## 2021-01-01 RX ADMIN — INSULIN LISPRO 3 UNITS: 100 INJECTION, SOLUTION INTRAVENOUS; SUBCUTANEOUS at 04:47

## 2021-01-01 RX ADMIN — PROPOFOL 50 MCG/KG/MIN: 10 INJECTION, EMULSION INTRAVENOUS at 02:22

## 2021-01-01 RX ADMIN — SODIUM CHLORIDE, PRESERVATIVE FREE 10 ML: 5 INJECTION INTRAVENOUS at 21:35

## 2021-01-01 RX ADMIN — DEXAMETHASONE SODIUM PHOSPHATE 20 MG: 4 INJECTION, SOLUTION INTRAMUSCULAR; INTRAVENOUS at 12:27

## 2021-01-01 RX ADMIN — SODIUM ZIRCONIUM CYCLOSILICATE 10 G: 10 POWDER, FOR SUSPENSION ORAL at 20:22

## 2021-01-01 RX ADMIN — Medication: at 06:06

## 2021-01-01 RX ADMIN — INSULIN LISPRO 3 UNITS: 100 INJECTION, SOLUTION INTRAVENOUS; SUBCUTANEOUS at 17:00

## 2021-01-01 RX ADMIN — INSULIN LISPRO 9 UNITS: 100 INJECTION, SOLUTION INTRAVENOUS; SUBCUTANEOUS at 03:42

## 2021-01-01 RX ADMIN — Medication 150 MCG/HR: at 01:04

## 2021-01-01 RX ADMIN — INSULIN LISPRO 3 UNITS: 100 INJECTION, SOLUTION INTRAVENOUS; SUBCUTANEOUS at 04:37

## 2021-01-01 RX ADMIN — CISATRACURIUM BESYLATE 1.5 MCG/KG/MIN: 10 INJECTION INTRAVENOUS at 23:58

## 2021-01-01 RX ADMIN — HEPARIN SODIUM 15 UNITS/KG/HR: 10000 INJECTION, SOLUTION INTRAVENOUS at 21:31

## 2021-01-01 RX ADMIN — PROPOFOL 50 MCG/KG/MIN: 10 INJECTION, EMULSION INTRAVENOUS at 05:40

## 2021-01-01 RX ADMIN — HYDROMORPHONE HYDROCHLORIDE 0.5 MG: 1 INJECTION, SOLUTION INTRAMUSCULAR; INTRAVENOUS; SUBCUTANEOUS at 10:55

## 2021-01-01 RX ADMIN — DEXAMETHASONE SODIUM PHOSPHATE 20 MG: 4 INJECTION, SOLUTION INTRAMUSCULAR; INTRAVENOUS at 12:16

## 2021-01-01 RX ADMIN — SODIUM ZIRCONIUM CYCLOSILICATE 5 G: 5 POWDER, FOR SUSPENSION ORAL at 09:59

## 2021-01-01 RX ADMIN — INSULIN LISPRO 3 UNITS: 100 INJECTION, SOLUTION INTRAVENOUS; SUBCUTANEOUS at 10:15

## 2021-01-01 RX ADMIN — POLYETHYLENE GLYCOL 3350 17 G: 17 POWDER, FOR SOLUTION ORAL at 20:14

## 2021-01-01 RX ADMIN — ENOXAPARIN SODIUM 80 MG: 100 INJECTION SUBCUTANEOUS at 21:34

## 2021-01-01 RX ADMIN — Medication 200 MCG/HR: at 05:51

## 2021-01-01 RX ADMIN — SODIUM BICARBONATE: 84 INJECTION, SOLUTION INTRAVENOUS at 20:27

## 2021-01-01 RX ADMIN — PROPOFOL 50 MCG/KG/MIN: 10 INJECTION, EMULSION INTRAVENOUS at 20:04

## 2021-01-01 RX ADMIN — ACETAMINOPHEN 650 MG: 325 TABLET ORAL at 09:57

## 2021-01-01 RX ADMIN — Medication 200 MCG/HR: at 11:03

## 2021-01-01 RX ADMIN — SODIUM CHLORIDE, PRESERVATIVE FREE 10 ML: 5 INJECTION INTRAVENOUS at 21:34

## 2021-01-01 RX ADMIN — INSULIN HUMAN 10 UNITS: 100 INJECTION, SOLUTION PARENTERAL at 21:54

## 2021-01-01 RX ADMIN — PROPOFOL 50 MCG/KG/MIN: 10 INJECTION, EMULSION INTRAVENOUS at 02:51

## 2021-01-01 RX ADMIN — CISATRACURIUM BESYLATE 2 MCG/KG/MIN: 10 INJECTION INTRAVENOUS at 00:10

## 2021-01-01 RX ADMIN — INSULIN LISPRO 3 UNITS: 100 INJECTION, SOLUTION INTRAVENOUS; SUBCUTANEOUS at 15:53

## 2021-01-01 RX ADMIN — HEPARIN SODIUM 3840 UNITS: 1000 INJECTION INTRAVENOUS; SUBCUTANEOUS at 21:25

## 2021-01-01 RX ADMIN — DEXAMETHASONE SODIUM PHOSPHATE 6 MG: 4 INJECTION, SOLUTION INTRAMUSCULAR; INTRAVENOUS at 07:52

## 2021-01-01 RX ADMIN — ENOXAPARIN SODIUM 80 MG: 100 INJECTION SUBCUTANEOUS at 20:41

## 2021-01-01 RX ADMIN — PHENYLEPHRINE HYDROCHLORIDE 300 MCG/MIN: 10 INJECTION INTRAVENOUS at 14:25

## 2021-01-01 RX ADMIN — PROPOFOL 50 MCG/KG/MIN: 10 INJECTION, EMULSION INTRAVENOUS at 20:53

## 2021-01-01 RX ADMIN — Medication 200 MCG/HR: at 05:25

## 2021-01-01 RX ADMIN — Medication 150 MCG/HR: at 18:45

## 2021-01-01 RX ADMIN — PROPOFOL 50 MCG/KG/MIN: 10 INJECTION, EMULSION INTRAVENOUS at 07:08

## 2021-01-01 RX ADMIN — POLYETHYLENE GLYCOL 3350 17 G: 17 POWDER, FOR SOLUTION ORAL at 22:03

## 2021-01-01 RX ADMIN — ACETAMINOPHEN 650 MG: 325 TABLET ORAL at 12:15

## 2021-01-01 RX ADMIN — INSULIN LISPRO 3 UNITS: 100 INJECTION, SOLUTION INTRAVENOUS; SUBCUTANEOUS at 16:08

## 2021-01-01 RX ADMIN — PHENYLEPHRINE HYDROCHLORIDE 300 MCG/MIN: 10 INJECTION INTRAVENOUS at 07:58

## 2021-01-01 RX ADMIN — Medication 175 MCG/HR: at 20:15

## 2021-01-01 RX ADMIN — Medication 200 MCG/HR: at 20:55

## 2021-01-01 RX ADMIN — VANCOMYCIN HYDROCHLORIDE 1000 MG: 10 INJECTION, POWDER, LYOPHILIZED, FOR SOLUTION INTRAVENOUS at 18:35

## 2021-01-01 RX ADMIN — FAMOTIDINE 20 MG: 10 INJECTION, SOLUTION INTRAVENOUS at 21:34

## 2021-01-01 RX ADMIN — Medication 200 MCG/HR: at 20:11

## 2021-01-01 RX ADMIN — MEROPENEM 1000 MG: 1 INJECTION, POWDER, FOR SOLUTION INTRAVENOUS at 07:10

## 2021-01-01 RX ADMIN — INSULIN LISPRO 6 UNITS: 100 INJECTION, SOLUTION INTRAVENOUS; SUBCUTANEOUS at 10:34

## 2021-01-01 RX ADMIN — PHENYLEPHRINE HYDROCHLORIDE 275 MCG/MIN: 10 INJECTION INTRAVENOUS at 17:33

## 2021-01-01 RX ADMIN — Medication 25 MCG/HR: at 06:24

## 2021-01-01 RX ADMIN — PROPOFOL 50 MCG/KG/MIN: 10 INJECTION, EMULSION INTRAVENOUS at 20:46

## 2021-01-01 RX ADMIN — Medication 200 MCG/HR: at 14:57

## 2021-01-01 RX ADMIN — DEXAMETHASONE SODIUM PHOSPHATE 10 MG: 4 INJECTION, SOLUTION INTRAMUSCULAR; INTRAVENOUS at 12:32

## 2021-01-01 RX ADMIN — INSULIN LISPRO 9 UNITS: 100 INJECTION, SOLUTION INTRAVENOUS; SUBCUTANEOUS at 17:28

## 2021-01-01 RX ADMIN — INSULIN LISPRO 6 UNITS: 100 INJECTION, SOLUTION INTRAVENOUS; SUBCUTANEOUS at 21:58

## 2021-01-01 RX ADMIN — DEXAMETHASONE SODIUM PHOSPHATE 20 MG: 4 INJECTION, SOLUTION INTRAMUSCULAR; INTRAVENOUS at 11:03

## 2021-01-01 RX ADMIN — Medication 200 MCG/HR: at 04:56

## 2021-01-01 RX ADMIN — PROPOFOL 50 MCG/KG/MIN: 10 INJECTION, EMULSION INTRAVENOUS at 06:33

## 2021-01-01 RX ADMIN — Medication 200 MCG/HR: at 06:18

## 2021-01-01 RX ADMIN — SODIUM CHLORIDE, POTASSIUM CHLORIDE, SODIUM LACTATE AND CALCIUM CHLORIDE: 600; 310; 30; 20 INJECTION, SOLUTION INTRAVENOUS at 10:54

## 2021-01-01 RX ADMIN — SODIUM CHLORIDE, PRESERVATIVE FREE 10 ML: 5 INJECTION INTRAVENOUS at 08:58

## 2021-01-01 RX ADMIN — PHENYLEPHRINE HYDROCHLORIDE 300 MCG/MIN: 10 INJECTION INTRAVENOUS at 09:00

## 2021-01-01 RX ADMIN — INSULIN LISPRO 3 UNITS: 100 INJECTION, SOLUTION INTRAVENOUS; SUBCUTANEOUS at 10:20

## 2021-01-01 RX ADMIN — PROPOFOL 50 MCG/KG/MIN: 10 INJECTION, EMULSION INTRAVENOUS at 23:24

## 2021-01-01 RX ADMIN — HEPARIN SODIUM 11 UNITS/KG/HR: 10000 INJECTION, SOLUTION INTRAVENOUS at 04:08

## 2021-01-01 RX ADMIN — PROPOFOL 50 MCG/KG/MIN: 10 INJECTION, EMULSION INTRAVENOUS at 13:03

## 2021-01-01 RX ADMIN — Medication 150 MCG/HR: at 14:18

## 2021-01-01 RX ADMIN — PROPOFOL 50 MCG/KG/MIN: 10 INJECTION, EMULSION INTRAVENOUS at 20:11

## 2021-01-01 RX ADMIN — PROPOFOL 50 MCG/KG/MIN: 10 INJECTION, EMULSION INTRAVENOUS at 18:10

## 2021-01-01 RX ADMIN — PROPOFOL 50 MCG/KG/MIN: 10 INJECTION, EMULSION INTRAVENOUS at 13:02

## 2021-01-01 RX ADMIN — PROPOFOL 50 MCG/KG/MIN: 10 INJECTION, EMULSION INTRAVENOUS at 09:59

## 2021-01-01 RX ADMIN — Medication 200 MCG/HR: at 20:26

## 2021-01-01 RX ADMIN — PROPOFOL 50 MCG/KG/MIN: 10 INJECTION, EMULSION INTRAVENOUS at 09:14

## 2021-01-01 RX ADMIN — Medication 200 MCG/HR: at 10:31

## 2021-01-01 RX ADMIN — INSULIN LISPRO 6 UNITS: 100 INJECTION, SOLUTION INTRAVENOUS; SUBCUTANEOUS at 21:33

## 2021-01-01 RX ADMIN — FAMOTIDINE 20 MG: 20 TABLET ORAL at 09:21

## 2021-01-01 RX ADMIN — HEPARIN SODIUM 12 UNITS/KG/HR: 10000 INJECTION, SOLUTION INTRAVENOUS at 02:26

## 2021-01-01 RX ADMIN — PHENYLEPHRINE HYDROCHLORIDE 300 MCG/MIN: 10 INJECTION INTRAVENOUS at 00:07

## 2021-01-01 RX ADMIN — Medication 200 MCG/HR: at 10:39

## 2021-01-01 RX ADMIN — PROPOFOL 50 MCG/KG/MIN: 10 INJECTION, EMULSION INTRAVENOUS at 01:20

## 2021-01-01 RX ADMIN — Medication 200 MCG/HR: at 15:47

## 2021-01-01 RX ADMIN — SODIUM ZIRCONIUM CYCLOSILICATE 10 G: 10 POWDER, FOR SUSPENSION ORAL at 12:48

## 2021-01-01 RX ADMIN — PROPOFOL 50 MCG/KG/MIN: 10 INJECTION, EMULSION INTRAVENOUS at 20:09

## 2021-01-01 RX ADMIN — Medication 200 MCG/HR: at 01:10

## 2021-01-01 RX ADMIN — PROPOFOL 50 MCG/KG/MIN: 10 INJECTION, EMULSION INTRAVENOUS at 21:27

## 2021-01-01 RX ADMIN — PROPOFOL 50 MCG/KG/MIN: 10 INJECTION, EMULSION INTRAVENOUS at 11:40

## 2021-01-01 RX ADMIN — SODIUM ZIRCONIUM CYCLOSILICATE 10 G: 10 POWDER, FOR SUSPENSION ORAL at 20:14

## 2021-01-01 RX ADMIN — PERFLUTREN 1.65 MG: 6.52 INJECTION, SUSPENSION INTRAVENOUS at 14:22

## 2021-01-01 RX ADMIN — FAMOTIDINE 20 MG: 10 INJECTION, SOLUTION INTRAVENOUS at 07:52

## 2021-01-01 RX ADMIN — INSULIN LISPRO 12 UNITS: 100 INJECTION, SOLUTION INTRAVENOUS; SUBCUTANEOUS at 22:16

## 2021-01-01 RX ADMIN — SODIUM BICARBONATE: 84 INJECTION, SOLUTION INTRAVENOUS at 14:38

## 2021-01-01 RX ADMIN — PROPOFOL 50 MCG/KG/MIN: 10 INJECTION, EMULSION INTRAVENOUS at 10:12

## 2021-01-01 RX ADMIN — Medication 200 MCG/HR: at 00:47

## 2021-01-01 RX ADMIN — Medication 200 MCG/HR: at 20:48

## 2021-01-01 RX ADMIN — PROPOFOL 50 MCG/KG/MIN: 10 INJECTION, EMULSION INTRAVENOUS at 08:20

## 2021-01-01 RX ADMIN — SODIUM ZIRCONIUM CYCLOSILICATE 10 G: 10 POWDER, FOR SUSPENSION ORAL at 13:30

## 2021-01-01 RX ADMIN — Medication 200 MCG/HR: at 23:59

## 2021-01-01 RX ADMIN — SODIUM ZIRCONIUM CYCLOSILICATE 10 G: 10 POWDER, FOR SUSPENSION ORAL at 14:10

## 2021-01-01 RX ADMIN — Medication 200 MCG/HR: at 16:47

## 2021-01-01 RX ADMIN — DEXTROSE MONOHYDRATE 25 G: 25 INJECTION, SOLUTION INTRAVENOUS at 18:51

## 2021-01-01 RX ADMIN — Medication 200 MCG/HR: at 06:20

## 2021-01-01 RX ADMIN — Medication 200 MCG/HR: at 14:35

## 2021-01-01 RX ADMIN — FAMOTIDINE 20 MG: 10 INJECTION, SOLUTION INTRAVENOUS at 20:41

## 2021-01-01 RX ADMIN — PHENYLEPHRINE HYDROCHLORIDE 300 MCG/MIN: 10 INJECTION INTRAVENOUS at 02:19

## 2021-01-01 RX ADMIN — PROPOFOL 50 MCG/KG/MIN: 10 INJECTION, EMULSION INTRAVENOUS at 17:22

## 2021-01-01 RX ADMIN — Medication: at 22:04

## 2021-01-01 RX ADMIN — Medication 175 MCG/HR: at 00:52

## 2021-01-01 RX ADMIN — POLYETHYLENE GLYCOL 3350 17 G: 17 POWDER, FOR SOLUTION ORAL at 10:03

## 2021-01-01 RX ADMIN — PHENYLEPHRINE HYDROCHLORIDE 300 MCG/MIN: 10 INJECTION INTRAVENOUS at 03:11

## 2021-01-01 RX ADMIN — SODIUM BICARBONATE: 84 INJECTION, SOLUTION INTRAVENOUS at 20:55

## 2021-01-01 RX ADMIN — VANCOMYCIN HYDROCHLORIDE 1000 MG: 10 INJECTION, POWDER, LYOPHILIZED, FOR SOLUTION INTRAVENOUS at 08:12

## 2021-01-01 RX ADMIN — PROPOFOL 50 MCG/KG/MIN: 10 INJECTION, EMULSION INTRAVENOUS at 03:55

## 2021-01-01 RX ADMIN — FAMOTIDINE 20 MG: 10 INJECTION, SOLUTION INTRAVENOUS at 10:28

## 2021-01-01 RX ADMIN — ENOXAPARIN SODIUM 80 MG: 100 INJECTION SUBCUTANEOUS at 09:54

## 2021-01-01 RX ADMIN — INSULIN LISPRO 3 UNITS: 100 INJECTION, SOLUTION INTRAVENOUS; SUBCUTANEOUS at 09:54

## 2021-01-01 RX ADMIN — PROPOFOL 50 MCG/KG/MIN: 10 INJECTION, EMULSION INTRAVENOUS at 05:53

## 2021-01-01 RX ADMIN — MEROPENEM 1000 MG: 1 INJECTION, POWDER, FOR SOLUTION INTRAVENOUS at 06:42

## 2021-01-01 RX ADMIN — CISATRACURIUM BESYLATE 1.5 MCG/KG/MIN: 10 INJECTION INTRAVENOUS at 23:21

## 2021-01-01 RX ADMIN — PHENYLEPHRINE HYDROCHLORIDE 300 MCG/MIN: 10 INJECTION INTRAVENOUS at 20:40

## 2021-01-01 RX ADMIN — Medication 200 MCG/HR: at 06:15

## 2021-01-01 RX ADMIN — INSULIN HUMAN 10 UNITS: 100 INJECTION, SOLUTION PARENTERAL at 18:50

## 2021-01-01 RX ADMIN — PROPOFOL 50 MCG/KG/MIN: 10 INJECTION, EMULSION INTRAVENOUS at 01:33

## 2021-01-01 RX ADMIN — INSULIN LISPRO 3 UNITS: 100 INJECTION, SOLUTION INTRAVENOUS; SUBCUTANEOUS at 22:25

## 2021-01-01 RX ADMIN — PROPOFOL 50 MCG/KG/MIN: 10 INJECTION, EMULSION INTRAVENOUS at 08:45

## 2021-01-01 RX ADMIN — Medication 200 MCG/HR: at 10:14

## 2021-01-01 RX ADMIN — INSULIN LISPRO 3 UNITS: 100 INJECTION, SOLUTION INTRAVENOUS; SUBCUTANEOUS at 17:22

## 2021-01-01 RX ADMIN — PROPOFOL 50 MCG/KG/MIN: 10 INJECTION, EMULSION INTRAVENOUS at 14:13

## 2021-01-01 RX ADMIN — Medication 200 MCG/HR: at 11:06

## 2021-01-01 RX ADMIN — HEPARIN SODIUM 3840 UNITS: 1000 INJECTION INTRAVENOUS; SUBCUTANEOUS at 04:08

## 2021-01-01 RX ADMIN — MEROPENEM 1000 MG: 1 INJECTION, POWDER, FOR SOLUTION INTRAVENOUS at 14:16

## 2021-01-01 RX ADMIN — SODIUM BICARBONATE: 84 INJECTION, SOLUTION INTRAVENOUS at 01:17

## 2021-01-01 RX ADMIN — INSULIN LISPRO 6 UNITS: 100 INJECTION, SOLUTION INTRAVENOUS; SUBCUTANEOUS at 17:19

## 2021-01-01 RX ADMIN — HEPARIN SODIUM 18 UNITS/KG/HR: 10000 INJECTION, SOLUTION INTRAVENOUS at 08:55

## 2021-01-01 RX ADMIN — HEPARIN SODIUM 13 UNITS/KG/HR: 10000 INJECTION, SOLUTION INTRAVENOUS at 20:06

## 2021-01-01 RX ADMIN — Medication 200 MCG/HR: at 10:59

## 2021-01-01 RX ADMIN — PROPOFOL 50 MCG/KG/MIN: 10 INJECTION, EMULSION INTRAVENOUS at 03:17

## 2021-01-01 RX ADMIN — HEPARIN SODIUM 11 UNITS/KG/HR: 10000 INJECTION, SOLUTION INTRAVENOUS at 02:51

## 2021-01-01 RX ADMIN — SODIUM ZIRCONIUM CYCLOSILICATE 10 G: 10 POWDER, FOR SUSPENSION ORAL at 15:30

## 2021-01-01 RX ADMIN — DEXTROSE MONOHYDRATE 25 G: 25 INJECTION, SOLUTION INTRAVENOUS at 21:53

## 2021-01-01 RX ADMIN — INSULIN LISPRO 6 UNITS: 100 INJECTION, SOLUTION INTRAVENOUS; SUBCUTANEOUS at 17:45

## 2021-01-01 RX ADMIN — Medication 200 MCG/HR: at 06:16

## 2021-01-01 RX ADMIN — PROPOFOL 50 MCG/KG/MIN: 10 INJECTION, EMULSION INTRAVENOUS at 19:55

## 2021-01-01 RX ADMIN — PROPOFOL 50 MCG/KG/MIN: 10 INJECTION, EMULSION INTRAVENOUS at 23:14

## 2021-01-01 RX ADMIN — INSULIN LISPRO 3 UNITS: 100 INJECTION, SOLUTION INTRAVENOUS; SUBCUTANEOUS at 22:06

## 2021-01-01 RX ADMIN — PROPOFOL 50 MCG/KG/MIN: 10 INJECTION, EMULSION INTRAVENOUS at 08:55

## 2021-01-01 RX ADMIN — HEPARIN SODIUM 13 UNITS/KG/HR: 10000 INJECTION, SOLUTION INTRAVENOUS at 23:08

## 2021-01-01 RX ADMIN — PROPOFOL 50 MCG/KG/MIN: 10 INJECTION, EMULSION INTRAVENOUS at 23:05

## 2021-01-01 RX ADMIN — INSULIN HUMAN 10 UNITS: 100 INJECTION, SOLUTION PARENTERAL at 06:35

## 2021-01-01 RX ADMIN — PHENYLEPHRINE HYDROCHLORIDE 300 MCG/MIN: 10 INJECTION INTRAVENOUS at 05:06

## 2021-01-01 RX ADMIN — SODIUM CHLORIDE, PRESERVATIVE FREE 10 ML: 5 INJECTION INTRAVENOUS at 20:20

## 2021-01-01 RX ADMIN — CISATRACURIUM BESYLATE 1 MCG/KG/MIN: 10 INJECTION INTRAVENOUS at 01:11

## 2021-01-01 RX ADMIN — PHENYLEPHRINE HYDROCHLORIDE 300 MCG/MIN: 10 INJECTION INTRAVENOUS at 21:12

## 2021-01-01 RX ADMIN — SODIUM CHLORIDE, PRESERVATIVE FREE 10 ML: 5 INJECTION INTRAVENOUS at 20:14

## 2021-01-01 RX ADMIN — VECURONIUM BROMIDE 0.5 MCG/KG/MIN: 1 INJECTION, POWDER, LYOPHILIZED, FOR SOLUTION INTRAVENOUS at 09:14

## 2021-01-01 RX ADMIN — PHENYLEPHRINE HYDROCHLORIDE 300 MCG/MIN: 10 INJECTION INTRAVENOUS at 11:51

## 2021-01-01 RX ADMIN — NOREPINEPHRINE BITARTRATE 2 MCG/MIN: 1 INJECTION, SOLUTION, CONCENTRATE INTRAVENOUS at 22:30

## 2021-01-01 RX ADMIN — INSULIN LISPRO 3 UNITS: 100 INJECTION, SOLUTION INTRAVENOUS; SUBCUTANEOUS at 21:38

## 2021-01-01 RX ADMIN — INSULIN LISPRO 9 UNITS: 100 INJECTION, SOLUTION INTRAVENOUS; SUBCUTANEOUS at 09:41

## 2021-01-01 RX ADMIN — ENOXAPARIN SODIUM 80 MG: 100 INJECTION SUBCUTANEOUS at 08:04

## 2021-01-01 RX ADMIN — DEXAMETHASONE SODIUM PHOSPHATE 10 MG: 4 INJECTION, SOLUTION INTRAMUSCULAR; INTRAVENOUS at 11:45

## 2021-01-01 RX ADMIN — SODIUM CHLORIDE, PRESERVATIVE FREE 10 ML: 5 INJECTION INTRAVENOUS at 08:01

## 2021-01-01 RX ADMIN — PROPOFOL 50 MCG/KG/MIN: 10 INJECTION, EMULSION INTRAVENOUS at 23:50

## 2021-01-01 RX ADMIN — Medication 200 MCG/HR: at 01:23

## 2021-01-01 RX ADMIN — VANCOMYCIN HYDROCHLORIDE 1000 MG: 10 INJECTION, POWDER, LYOPHILIZED, FOR SOLUTION INTRAVENOUS at 15:11

## 2021-01-01 RX ADMIN — INSULIN LISPRO 3 UNITS: 100 INJECTION, SOLUTION INTRAVENOUS; SUBCUTANEOUS at 04:42

## 2021-01-01 RX ADMIN — PROPOFOL 50 MCG/KG/MIN: 10 INJECTION, EMULSION INTRAVENOUS at 09:29

## 2021-01-01 RX ADMIN — FAMOTIDINE 20 MG: 10 INJECTION, SOLUTION INTRAVENOUS at 08:00

## 2021-01-01 RX ADMIN — POLYETHYLENE GLYCOL 3350 17 G: 17 POWDER, FOR SOLUTION ORAL at 09:21

## 2021-01-01 RX ADMIN — DEXAMETHASONE SODIUM PHOSPHATE 10 MG: 4 INJECTION, SOLUTION INTRAMUSCULAR; INTRAVENOUS at 12:33

## 2021-01-01 RX ADMIN — PROPOFOL 50 MCG/KG/MIN: 10 INJECTION, EMULSION INTRAVENOUS at 14:47

## 2021-01-01 RX ADMIN — INSULIN LISPRO 3 UNITS: 100 INJECTION, SOLUTION INTRAVENOUS; SUBCUTANEOUS at 12:16

## 2021-01-01 RX ADMIN — CISATRACURIUM BESYLATE 2 MCG/KG/MIN: 10 INJECTION INTRAVENOUS at 17:34

## 2021-01-01 RX ADMIN — Medication: at 21:43

## 2021-01-01 RX ADMIN — INSULIN LISPRO 6 UNITS: 100 INJECTION, SOLUTION INTRAVENOUS; SUBCUTANEOUS at 04:30

## 2021-01-01 RX ADMIN — INSULIN LISPRO 6 UNITS: 100 INJECTION, SOLUTION INTRAVENOUS; SUBCUTANEOUS at 23:29

## 2021-01-01 RX ADMIN — PROPOFOL 50 MCG/KG/MIN: 10 INJECTION, EMULSION INTRAVENOUS at 16:02

## 2021-01-01 RX ADMIN — PROPOFOL 50 MCG/KG/MIN: 10 INJECTION, EMULSION INTRAVENOUS at 13:00

## 2021-01-01 RX ADMIN — INSULIN HUMAN 10 UNITS: 100 INJECTION, SOLUTION PARENTERAL at 07:31

## 2021-01-01 RX ADMIN — HEPARIN SODIUM 3840 UNITS: 1000 INJECTION INTRAVENOUS; SUBCUTANEOUS at 19:04

## 2021-01-01 RX ADMIN — DEXAMETHASONE SODIUM PHOSPHATE 20 MG: 4 INJECTION, SOLUTION INTRAMUSCULAR; INTRAVENOUS at 13:08

## 2021-01-01 RX ADMIN — PROPOFOL 50 MCG/KG/MIN: 10 INJECTION, EMULSION INTRAVENOUS at 17:48

## 2021-01-01 RX ADMIN — ACETAMINOPHEN 650 MG: 325 TABLET ORAL at 04:55

## 2021-01-01 RX ADMIN — Medication 200 MCG/HR: at 16:15

## 2021-01-01 RX ADMIN — VASOPRESSIN 0.04 UNITS/MIN: 20 INJECTION INTRAVENOUS at 01:43

## 2021-01-01 RX ADMIN — Medication 150 MCG/HR: at 13:24

## 2021-01-01 RX ADMIN — Medication 8.8 MG: at 21:42

## 2021-01-01 RX ADMIN — FAMOTIDINE 20 MG: 20 TABLET ORAL at 08:58

## 2021-01-01 RX ADMIN — PROPOFOL 50 MCG/KG/MIN: 10 INJECTION, EMULSION INTRAVENOUS at 02:52

## 2021-01-01 RX ADMIN — SODIUM CHLORIDE, PRESERVATIVE FREE 10 ML: 5 INJECTION INTRAVENOUS at 09:54

## 2021-01-01 RX ADMIN — PROPOFOL 50 MCG/KG/MIN: 10 INJECTION, EMULSION INTRAVENOUS at 07:52

## 2021-01-01 RX ADMIN — SODIUM ZIRCONIUM CYCLOSILICATE 10 G: 10 POWDER, FOR SUSPENSION ORAL at 09:21

## 2021-01-01 RX ADMIN — PROPOFOL 50 MCG/KG/MIN: 10 INJECTION, EMULSION INTRAVENOUS at 14:56

## 2021-01-01 RX ADMIN — PROPOFOL 50 MCG/KG/MIN: 10 INJECTION, EMULSION INTRAVENOUS at 17:41

## 2021-01-01 RX ADMIN — ACETAMINOPHEN 1000 MG: 500 TABLET, FILM COATED ORAL at 01:21

## 2021-01-01 RX ADMIN — CISATRACURIUM BESYLATE 2 MCG/KG/MIN: 10 INJECTION INTRAVENOUS at 06:17

## 2021-01-01 RX ADMIN — SODIUM CHLORIDE, SODIUM LACTATE, POTASSIUM CHLORIDE, AND CALCIUM CHLORIDE 1000 ML: .6; .31; .03; .02 INJECTION, SOLUTION INTRAVENOUS at 17:09

## 2021-01-01 RX ADMIN — Medication 200 MCG/HR: at 06:34

## 2021-01-01 RX ADMIN — Medication 200 MCG/HR: at 01:31

## 2021-01-01 RX ADMIN — FAMOTIDINE 20 MG: 20 TABLET ORAL at 12:48

## 2021-01-01 RX ADMIN — SODIUM CHLORIDE, PRESERVATIVE FREE 10 ML: 5 INJECTION INTRAVENOUS at 19:56

## 2021-01-01 RX ADMIN — SODIUM BICARBONATE: 84 INJECTION, SOLUTION INTRAVENOUS at 06:01

## 2021-01-01 RX ADMIN — FAMOTIDINE 20 MG: 20 TABLET ORAL at 08:20

## 2021-01-01 RX ADMIN — PHENYLEPHRINE HYDROCHLORIDE 300 MCG/MIN: 10 INJECTION INTRAVENOUS at 06:03

## 2021-01-01 RX ADMIN — PROPOFOL 50 MCG/KG/MIN: 10 INJECTION, EMULSION INTRAVENOUS at 16:20

## 2021-01-01 RX ADMIN — CALCIUM GLUCONATE 2000 MG: 98 INJECTION, SOLUTION INTRAVENOUS at 05:07

## 2021-01-01 RX ADMIN — Medication 200 MCG/HR: at 20:30

## 2021-01-01 RX ADMIN — SODIUM ZIRCONIUM CYCLOSILICATE 10 G: 10 POWDER, FOR SUSPENSION ORAL at 14:39

## 2021-01-01 RX ADMIN — PROPOFOL 50 MCG/KG/MIN: 10 INJECTION, EMULSION INTRAVENOUS at 23:41

## 2021-01-01 RX ADMIN — SODIUM CHLORIDE, SODIUM LACTATE, POTASSIUM CHLORIDE, AND CALCIUM CHLORIDE 1000 ML: .6; .31; .03; .02 INJECTION, SOLUTION INTRAVENOUS at 16:06

## 2021-01-01 RX ADMIN — DEXAMETHASONE SODIUM PHOSPHATE 10 MG: 4 INJECTION, SOLUTION INTRAMUSCULAR; INTRAVENOUS at 12:22

## 2021-01-01 RX ADMIN — MEROPENEM 500 MG: 500 INJECTION, POWDER, FOR SOLUTION INTRAVENOUS at 04:24

## 2021-01-01 RX ADMIN — INSULIN LISPRO 6 UNITS: 100 INJECTION, SOLUTION INTRAVENOUS; SUBCUTANEOUS at 22:17

## 2021-01-01 RX ADMIN — INSULIN LISPRO 3 UNITS: 100 INJECTION, SOLUTION INTRAVENOUS; SUBCUTANEOUS at 11:04

## 2021-01-01 RX ADMIN — PROPOFOL 49.98 MCG/KG/MIN: 10 INJECTION, EMULSION INTRAVENOUS at 23:39

## 2021-01-01 RX ADMIN — Medication 200 MCG/HR: at 16:44

## 2021-01-01 RX ADMIN — Medication 8.8 MG: at 22:03

## 2021-01-01 RX ADMIN — INSULIN LISPRO 6 UNITS: 100 INJECTION, SOLUTION INTRAVENOUS; SUBCUTANEOUS at 05:02

## 2021-01-01 ASSESSMENT — PAIN SCALES - GENERAL
PAINLEVEL_OUTOF10: 0

## 2021-01-01 ASSESSMENT — PULMONARY FUNCTION TESTS
PIF_VALUE: 32
PIF_VALUE: 47
PIF_VALUE: 40
PIF_VALUE: 41
PIF_VALUE: 32
PIF_VALUE: 45
PIF_VALUE: 37
PIF_VALUE: 37
PIF_VALUE: 39
PIF_VALUE: 42
PIF_VALUE: 47
PIF_VALUE: 38
PIF_VALUE: 43
PIF_VALUE: 44
PIF_VALUE: 42
PIF_VALUE: 38
PIF_VALUE: 42
PIF_VALUE: 27
PIF_VALUE: 49
PIF_VALUE: 38
PIF_VALUE: 44
PIF_VALUE: 41
PIF_VALUE: 42
PIF_VALUE: 44
PIF_VALUE: 36
PIF_VALUE: 42
PIF_VALUE: 42
PIF_VALUE: 45
PIF_VALUE: 41
PIF_VALUE: 33
PIF_VALUE: 34
PIF_VALUE: 42
PIF_VALUE: 45
PIF_VALUE: 33
PIF_VALUE: 44
PIF_VALUE: 38
PIF_VALUE: 47
PIF_VALUE: 46
PIF_VALUE: 42
PIF_VALUE: 37
PIF_VALUE: 43
PIF_VALUE: 39
PIF_VALUE: 40
PIF_VALUE: 43
PIF_VALUE: 38
PIF_VALUE: 44
PIF_VALUE: 28
PIF_VALUE: 43
PIF_VALUE: 35
PIF_VALUE: 36
PIF_VALUE: 34
PIF_VALUE: 51
PIF_VALUE: 34
PIF_VALUE: 42
PIF_VALUE: 39
PIF_VALUE: 43
PIF_VALUE: 41
PIF_VALUE: 33
PIF_VALUE: 41
PIF_VALUE: 43
PIF_VALUE: 40
PIF_VALUE: 45
PIF_VALUE: 38
PIF_VALUE: 33
PIF_VALUE: 37
PIF_VALUE: 34
PIF_VALUE: 40
PIF_VALUE: 45
PIF_VALUE: 50
PIF_VALUE: 44
PIF_VALUE: 31
PIF_VALUE: 43
PIF_VALUE: 45
PIF_VALUE: 42
PIF_VALUE: 35
PIF_VALUE: 37
PIF_VALUE: 36
PIF_VALUE: 48
PIF_VALUE: 41
PIF_VALUE: 36
PIF_VALUE: 41
PIF_VALUE: 39
PIF_VALUE: 40
PIF_VALUE: 35
PIF_VALUE: 34
PIF_VALUE: 53
PIF_VALUE: 42
PIF_VALUE: 47
PIF_VALUE: 39
PIF_VALUE: 46
PIF_VALUE: 42
PIF_VALUE: 38
PIF_VALUE: 43
PIF_VALUE: 44
PIF_VALUE: 44
PIF_VALUE: 33
PIF_VALUE: 39
PIF_VALUE: 43
PIF_VALUE: 42
PIF_VALUE: 43
PIF_VALUE: 37
PIF_VALUE: 47
PIF_VALUE: 36
PIF_VALUE: 38
PIF_VALUE: 38
PIF_VALUE: 39
PIF_VALUE: 36
PIF_VALUE: 28
PIF_VALUE: 41
PIF_VALUE: 40
PIF_VALUE: 40
PIF_VALUE: 34
PIF_VALUE: 43
PIF_VALUE: 42
PIF_VALUE: 44
PIF_VALUE: 46
PIF_VALUE: 29
PIF_VALUE: 41
PIF_VALUE: 42
PIF_VALUE: 41
PIF_VALUE: 28
PIF_VALUE: 39
PIF_VALUE: 40
PIF_VALUE: 39
PIF_VALUE: 40
PIF_VALUE: 34
PIF_VALUE: 35
PIF_VALUE: 40
PIF_VALUE: 51
PIF_VALUE: 32
PIF_VALUE: 33
PIF_VALUE: 43
PIF_VALUE: 38
PIF_VALUE: 40
PIF_VALUE: 40
PIF_VALUE: 44
PIF_VALUE: 36
PIF_VALUE: 38
PIF_VALUE: 39
PIF_VALUE: 42
PIF_VALUE: 43
PIF_VALUE: 45
PIF_VALUE: 34
PIF_VALUE: 43
PIF_VALUE: 44
PIF_VALUE: 34
PIF_VALUE: 39
PIF_VALUE: 42
PIF_VALUE: 44
PIF_VALUE: 33
PIF_VALUE: 43
PIF_VALUE: 40
PIF_VALUE: 38
PIF_VALUE: 46
PIF_VALUE: 37
PIF_VALUE: 34
PIF_VALUE: 44
PIF_VALUE: 36
PIF_VALUE: 41
PIF_VALUE: 45
PIF_VALUE: 37
PIF_VALUE: 46
PIF_VALUE: 39
PIF_VALUE: 39
PIF_VALUE: 43
PIF_VALUE: 40
PIF_VALUE: 38
PIF_VALUE: 39
PIF_VALUE: 42
PIF_VALUE: 33
PIF_VALUE: 39
PIF_VALUE: 26
PIF_VALUE: 42
PIF_VALUE: 38
PIF_VALUE: 36
PIF_VALUE: 50
PIF_VALUE: 44
PIF_VALUE: 42
PIF_VALUE: 41
PIF_VALUE: 27
PIF_VALUE: 36
PIF_VALUE: 44
PIF_VALUE: 41
PIF_VALUE: 40
PIF_VALUE: 36
PIF_VALUE: 35
PIF_VALUE: 42
PIF_VALUE: 27
PIF_VALUE: 42
PIF_VALUE: 41
PIF_VALUE: 46
PIF_VALUE: 36
PIF_VALUE: 35
PIF_VALUE: 36
PIF_VALUE: 40
PIF_VALUE: 43
PIF_VALUE: 42
PIF_VALUE: 46
PIF_VALUE: 37
PIF_VALUE: 55
PIF_VALUE: 39
PIF_VALUE: 39
PIF_VALUE: 40
PIF_VALUE: 42
PIF_VALUE: 39
PIF_VALUE: 42
PIF_VALUE: 38
PIF_VALUE: 40
PIF_VALUE: 43
PIF_VALUE: 44
PIF_VALUE: 34
PIF_VALUE: 38
PIF_VALUE: 36
PIF_VALUE: 36
PIF_VALUE: 37
PIF_VALUE: 40
PIF_VALUE: 43
PIF_VALUE: 41
PIF_VALUE: 45
PIF_VALUE: 39
PIF_VALUE: 32
PIF_VALUE: 42
PIF_VALUE: 38
PIF_VALUE: 43
PIF_VALUE: 26
PIF_VALUE: 42
PIF_VALUE: 38
PIF_VALUE: 46
PIF_VALUE: 46
PIF_VALUE: 38
PIF_VALUE: 41
PIF_VALUE: 40
PIF_VALUE: 43
PIF_VALUE: 38
PIF_VALUE: 39
PIF_VALUE: 39
PIF_VALUE: 42
PIF_VALUE: 38
PIF_VALUE: 44
PIF_VALUE: 46
PIF_VALUE: 37
PIF_VALUE: 38
PIF_VALUE: 46
PIF_VALUE: 44
PIF_VALUE: 38
PIF_VALUE: 45
PIF_VALUE: 41
PIF_VALUE: 44
PIF_VALUE: 41
PIF_VALUE: 45
PIF_VALUE: 44
PIF_VALUE: 46
PIF_VALUE: 32
PIF_VALUE: 34
PIF_VALUE: 39
PIF_VALUE: 42
PIF_VALUE: 44
PIF_VALUE: 37
PIF_VALUE: 45
PIF_VALUE: 27
PIF_VALUE: 46
PIF_VALUE: 33
PIF_VALUE: 35
PIF_VALUE: 46
PIF_VALUE: 42
PIF_VALUE: 41
PIF_VALUE: 43
PIF_VALUE: 40
PIF_VALUE: 46
PIF_VALUE: 33
PIF_VALUE: 43
PIF_VALUE: 38
PIF_VALUE: 37
PIF_VALUE: 41
PIF_VALUE: 45
PIF_VALUE: 44
PIF_VALUE: 27
PIF_VALUE: 38
PIF_VALUE: 44
PIF_VALUE: 43
PIF_VALUE: 37
PIF_VALUE: 42
PIF_VALUE: 42
PIF_VALUE: 43
PIF_VALUE: 40
PIF_VALUE: 42
PIF_VALUE: 35
PIF_VALUE: 37
PIF_VALUE: 39
PIF_VALUE: 33
PIF_VALUE: 38
PIF_VALUE: 44
PIF_VALUE: 37
PIF_VALUE: 45
PIF_VALUE: 40
PIF_VALUE: 39
PIF_VALUE: 37
PIF_VALUE: 42
PIF_VALUE: 41
PIF_VALUE: 43
PIF_VALUE: 43
PIF_VALUE: 32
PIF_VALUE: 43
PIF_VALUE: 40
PIF_VALUE: 50
PIF_VALUE: 47
PIF_VALUE: 40
PIF_VALUE: 41
PIF_VALUE: 40
PIF_VALUE: 35
PIF_VALUE: 36
PIF_VALUE: 37
PIF_VALUE: 44
PIF_VALUE: 35
PIF_VALUE: 40
PIF_VALUE: 47

## 2021-10-29 PROBLEM — J12.82 PNEUMONIA DUE TO COVID-19 VIRUS: Status: ACTIVE | Noted: 2021-01-01

## 2021-10-29 PROBLEM — U07.1 PNEUMONIA DUE TO COVID-19 VIRUS: Status: ACTIVE | Noted: 2021-01-01

## 2021-10-31 PROBLEM — J96.01 ACUTE RESPIRATORY FAILURE WITH HYPOXIA (HCC): Status: ACTIVE | Noted: 2021-01-01

## 2021-10-31 PROBLEM — J93.9 PNEUMOTHORAX ON LEFT: Status: ACTIVE | Noted: 2021-01-01

## 2021-10-31 PROBLEM — J98.2 PNEUMOMEDIASTINUM (HCC): Status: ACTIVE | Noted: 2021-01-01

## 2021-10-31 NOTE — PLAN OF CARE
Problem: Non-Violent Restraints  Goal: Removal from restraints as soon as assessed to be safe  Outcome: Ongoing  Goal: No harm/injury to patient while restraints in use  Outcome: Ongoing  Goal: Patient's dignity will be maintained  Outcome: Ongoing     Problem: Infection:  Goal: Will remain free from infection  Description: Will remain free from infection  Outcome: Ongoing     Problem: Skin Integrity:  Goal: Skin integrity will stabilize  Description: Skin integrity will stabilize  Outcome: Ongoing

## 2021-10-31 NOTE — PROGRESS NOTES
4 Eyes Admission Assessment     I agree as the admission nurse that 2 RN's have performed a thorough Head to Toe Skin Assessment on the patient. ALL assessment sites listed below have been assessed on admission. Areas assessed by both nurses:   [x]   Head, Face, and Ears   [x]   Shoulders, Back, and Chest  [x]   Arms, Elbows, and Hands   [x]   Coccyx, Sacrum, and Ischium  [x]   Legs, Feet, and Heels    Blanchable redness on sacrum (sacral heart applied)        Does the Patient have Skin Breakdown?   No         Harpreet Prevention initiated:  Yes   Wound Care Orders initiated:  No      [facility] nurse consulted for Pressure Injury (Stage 3,4, Unstageable, DTI, NWPT, and Complex wounds) or Harpreet score 18 or lower:  No      Nurse 1 eSignature: Electronically signed by Declan Shabazz RN on 10/31/21 at 6:36 AM EDT    Nurse 2 eSignature: Electronically signed by Claribel Lara RN on 10/31/21 at 6:49 AM EDT

## 2021-10-31 NOTE — PROGRESS NOTES
Clinical Pharmacy Progress Note    IV vancomycin - Management by Pharmacy    Consult Date(s): 10/31/21   Consulting Provider(s): Dr. Jacob Jeter / Plan    Indication: Pneumonia (HAP) - Vancomycin   Concurrent Antimicrobials: Meropenem   Day of Vanc Therapy: #1   Current Dosing Method: (AUC)   Therapeutic Goal: 400 - 600 mg/L.hr   Current Dose / Frequency: 750 mg IV every 12 hours   Plan / Rationale:   o Per Adult/Obese Eric dosing model, the above regimen is expected to produce and AUC of 501 mg/L.hr and trough of 16.0mg/L   Will continue to monitor clinical condition and make adjustments to regimen as appropriate. Thank you for consulting Pharmacy! Dejah Medina [facility]        Subjective/Objective: Mr. Gabe Dominguez is a 45 y.o. male with a PMHx significant for COVID-19, admitted for Pneumonia (HAP. Pharmacy has been consulted to vancomycin. Height:   Ht Readings from Last 1 Encounters:   10/31/21 5' 6\" (1.676 m)     Weight:   Wt Readings from Last 1 Encounters:   10/31/21 208 lb 12.4 oz (94.7 kg)       Level(s) / Doses:    Date Time Dose Level / Type of Level Interpretation                 Note: Serum levels collected for AUC-based dosing may be high if collected in close proximity to the dose administered. This is not necessarily an indicator of toxicity. Cultures & Sensitivities:    Date Site Micro Susceptibility / Result                 Labs / Ancillary Data:    Estimated Creatinine Clearance: 78 mL/min (based on SCr of 1.2 mg/dL).     Recent Labs     10/31/21  0555   CREATININE 1.2   BUN 28*

## 2021-10-31 NOTE — PROGRESS NOTES
Pt admitted to ICU room 4504. Arrival time: 36. -140s. -110s. During transport, pt on levo gtt. Levo off at this time. Pt intubated. 24 at the lips. Diminished breath sounds. Satting lower 90s. R nare NG, no external catheter length noted on tube. CXR/ KUB complete - awaiting results. Pupils equal and reactive. No movement to painful stimuli in BUE/ BLE. Propofol infusing. Fentanyl gtt started. Versed gtt infusing during transport and discontinued on arrival. CVC RIJ. One PIV in L hand. L radial art line. Restraints in place. Febrile: 100.8. Rectal temperature probed removed on arrival. Hypoactive bowel sounds, smear bowel movement. Tenorio in place - rain/ sediment/ malodorous, 475 ml emptied. All lines/ tubes placed at outside hospital.     Per outside hospital report, Jeri Silva is pt's contact/ spouse. Phone number: [phone].

## 2021-10-31 NOTE — CONSULTS
Clinical Pharmacy Progress Note    Vancomycin - Management by Pharmacy    Consult Date(s): 10/31  Consulting Provider(s): Dr Cameron Fischer / Plan    1. HAP,  Bacteremia (per OSH culture) - Vancomycin   Concurrent Antimicrobials: None.  Day of Vanc Therapy: continued from OSH, unclear when vanc started   Current Dosing Method: Intermittent   Therapeutic Goal: 15-20 mcg/mL levels   Current Dose / Frequency /Plan / Rationale:  o Random level = 23.6 mcg/mL this AM upon arrival to [facility].   o Will order another Random level ~6 hrs later to check kinetics. o Will update this note with plan.  Will continue to monitor clinical condition and make adjustments to regimen as appropriate. Addendum @ 13:45 -  · Random level @ 12:13 = 16.2 mcg/mL  · Calculated half life ~12 hrs based on the two random levels today. · Per resident, baseline Scr ~1. Will schedule 1000mg IV q12h for now, and will check levels when clinically appropriate. Thank you for consulting Pharmacy! Chela Holliday PharmD., [facility]   10/31/2021 11:22 AM  Wireless: [phone]      Subjective/Objective: Mr. Vianca Dempsey is a 45 y.o. male with no charted PMHx at this time. Patient started with COVID sx on 10/16, and diagnosed with +COVID test on 10/21. Admitted to OSH (in [place]) on 10/26 with SOB requiring supplemental O2; placed on dexamethasone, doxycycline and CTX per notes for PNA. Found to have acute PE. Respiratory function worsened,and pt was intubated 10/30 and transferred to [facility]. Blood cx + with Staph and respiratory cx with GPC. Pharmacy has been consulted to dose vancomycin per Dr Yaakov Calero.     Height:   Ht Readings from Last 1 Encounters:   10/31/21 5' 6\" (1.676 m)     Weight:   Wt Readings from Last 1 Encounters:   10/31/21 208 lb 12.4 oz (94.7 kg)     Current antibiotics:   (10/31)  Vancomycin -- pharmacy to dose -- continued from OSH   Intermittent via levels (10/31-current)      Level(s) / Doses:  Date Time Dose Level / Type of Level Interpretation   10/31 0555  Random = 23.6 mcg/ml · Drawn upon admission to [facility]. · Per ICU Rn, no MAR from OSH sent. Unclear prior vanc dosing. 10/31 1213  Random = 16.2 mcg/mL · Drawn ~6 hrs after last random level. · Calculated half-life ~12 hrs. Note: Serum levels collected for AUC-based dosing may be high if collected in close proximity to the dose administered. This is not necessarily an indicator of toxicity. Cultures & Sensitivities:    Date Site Micro Susceptibility / Result   10/31 Blood x2 Await collection    10/31 Resp Await collection      Labs / Ancillary Data:    Estimated Creatinine Clearance: 78 mL/min (based on SCr of 1.2 mg/dL).     Recent Labs     10/31/21  0555   CREATININE 1.2   BUN 28*   WBC 29.6*       Additional Lab Values / Findings of Note:   Procalcitonin (10/31) = in process

## 2021-10-31 NOTE — PROGRESS NOTES
Kendra, the patient's wife, was called and updated. Her contact information was also updated in the chart and is accurate. Verified that the patient has no known allergies with her. She greatly appreciates updates.

## 2021-10-31 NOTE — PROGRESS NOTES
Patient has been admitted to the ICU, patient is Covid positive, intubated. To minimize the exposure, and preserve the PPE patient was not seen physically. We will defer the management to intensive care unit team.      Once the patient is Covid negative/out of the ICU will assume care. Please call with questions.       Namrata Graham MD

## 2021-10-31 NOTE — CONSULTS
ICU HISTORY [address] [place] [address] Day: 2  ICU Day: 2                                                         Code:Full Code  Admit Date: 10/31/2021  PCP: No primary care provider on file. CC: COVID PNA    HISTORY OF PRESENT ILLNESS:     54-year-old male presented to ER on [place], [place] for shortness of breath on 10/25. Patient experiencing shortness of breath on 10/16, was diagnosed with Covid on 10/21 and was put on antibiotics and dexamethasone with albuterol inhaler. However his symptoms worsened which prompted the ED visit in the ED CTA showed nonocclusive pulmonary embolism in the anterior portion of the left lower lobe with filling defects extending to the several proximal segment of the left lower lobe. During the admission at the outside hospital patient refused remdesivir, Actemra but was agreeable to steroids. Patient was initially on 15 L nonrebreather however oxygenation worsened as the days progressed and patient was unable to tolerate high flow O2 therapy and CPAP secondary to anxiety. Outside hospital attempted proning however did not help with oxygenation. Decision was made to intubate the patient and transfer to the [facility], [facility].    Patient was initially started on doxycycline and azithromycin for possible superimposed pneumonia. Infectious work-up from outside hospital shows nonspeciated staph in his blood and gram-positive cocci in this respiratory culture. PAST HISTORY:   No past medical history on file. No past surgical history on file. SocialHistory:   The patient lives at home  Alcohol: unknown  Illicit drugs: no use  Tobacco: none     Family History:  No family history on file. MEDICATIONS:     No current facility-administered medications on file prior to encounter. No current outpatient medications on file prior to encounter.      Scheduled Meds:   sodium chloride flush  5-40 mL IntraVENous 2 times per day    meropenem  1,000 mg IntraVENous Q8H    famotidine (PEPCID) injection  20 mg IntraVENous BID    dexamethasone  6 mg IntraVENous Daily    enoxaparin  80 mg SubCUTAneous BID    insulin lispro  0-6 Units SubCUTAneous 4x Daily    vancomycin  750 mg IntraVENous Q12H      Continuous Infusions:   propofol 50 mcg/kg/min (10/31/21 0545)    fentaNYL 25 mcg/hr (10/31/21 0624)    sodium chloride      dextrose       PRN Meds:sodium chloride flush, sodium chloride, ondansetron **OR** ondansetron, polyethylene glycol, acetaminophen **OR** acetaminophen, glucose, dextrose, glucagon (rDNA), dextrose    Allergies: Not on File    PHYSICAL EXAM:   Vitals: BP (!) 139/94   Pulse 135   Temp 100.8 °F (38.2 °C) (Axillary)   Resp (!) 33   Ht 5' 6\" (1.676 m) Comment: Per outside hospital  Wt 208 lb 12.4 oz (94.7 kg)   SpO2 92%   BMI 33.70 kg/m²     I/O:      Intake/Output Summary (Last 24 hours) at 10/31/2021 0712  Last data filed at 10/31/2021 0650  Gross per 24 hour   Intake    Output 475 ml   Net -475 ml     No intake/output data recorded. I/O last 3 completed shifts:  In: -   Out: 475 [Urine:475]    Physical Exam  Constitutional:       General: He is not in acute distress. Appearance: Normal appearance. He is normal weight. He is not ill-appearing, toxic-appearing or diaphoretic. Comments: Intubated, sedated   Eyes:      Pupils: Pupils are equal, round, and reactive to light. Cardiovascular:      Rate and Rhythm: Regular rhythm. Tachycardia present. Pulses: Normal pulses. Heart sounds: Murmur heard. Pulmonary:      Effort: Pulmonary effort is normal. No respiratory distress. Breath sounds: Wheezing and rhonchi present. Abdominal:      General: Bowel sounds are normal. There is no distension. Palpations: Abdomen is soft. Musculoskeletal:         General: No swelling.        Recent Labs     10/31/21  0555   PHART 7.171*   CNE9XHK 67.2*   PO2ART 75.4     DATA:   Labs:  CBC:   Recent Labs 10/31/21  0555   WBC 29.6*   HGB 12.9*   HCT 40.1*       BMP:   Recent Labs     10/31/21  0555      K 5.1      CO2 21   BUN 28*   CREATININE 1.2   GLUCOSE 141*     LFT's:   Recent Labs     10/31/21  0555   AST 43*   ALT 52*   BILITOT 1.0   ALKPHOS 168*     Troponin: No results for input(s): TROPONINI in the last 72 hours. BNP:No results for input(s): BNP in the last 72 hours. ABGs:   Recent Labs     10/31/21  0555   PHART 7.171*   LCS1BAF 67.2*   PO2ART 75.4     INR: No results for input(s): INR in the last 72 hours. U/A:No results for input(s): NITRITE, COLORU, PHUR, LABCAST, WBCUA, RBCUA, MUCUS, TRICHOMONAS, YEAST, BACTERIA, CLARITYU, SPECGRAV, LEUKOCYTESUR, UROBILINOGEN, BILIRUBINUR, BLOODU, GLUCOSEU, AMORPHOUS in the last 72 hours. Invalid input(s): KETONESU    XR CHEST PORTABLE    (Results Pending)   XR ABDOMEN (KUB) (SINGLE AP VIEW)    (Results Pending)     ASSESSMENT AND PLAN:     Acute hypoxic respiratory failure requiring intubation   Severe ARDS  Sedated and paralysed 2/2 increased work of breathing  COVID-19 PNA, day 15 since symptom onset  Vent Mode: PRVC Rate Set: 30 bmp/Vt Ordered: 500 mL/ /FiO2 : 100 %  - Sedated and paralysed with propofol, fentanyl and nimbex  - Will switch to high dose decadron  - continue full dose lovenox  - ABG shows hypercarbia and hypoxemia. Will attempt proning     Small left apical PTX  Pneumomediastinum   - No intervention needed at this moment  - Daily CXR    Bactermia, unspeciated staph  Sepsis  - Outside hospital blood cx grew staph (unspeciated), resp cultures also had gram +ve cocci  - Vanc for sepsis. Not in shock. Follow infectious workup    Pulmonary embolism  - on therapeutic lovenox    Sinus tachycardia  - monitor. Likely from discomfort 2/2 intubation.  Will monitor and optimise sedation and paralytic    TRINIDAD  - Monitor UOP  - Trend Cr BID as pt is on vecuronium    Elevated liver enzymes  - Trend daily as pt is on vec    Code Status:Full

## 2021-10-31 NOTE — H&P
ICU HISTORY AND 2025 [facility] Day: 1  ICU Day: 1                                                         Code:Full Code  Admit Date: 10/31/2021  PCP: No primary care provider on file. CC: COVID PNA    HISTORY OF PRESENT ILLNESS:   Kate Gowers is a 45year old male who presented to the ER in [facility] for SOB on 10/25, he was diagnosed with COVID on the previous Thursday. Symptoms started on 10/16. Patient was started on antibiotic and dexamethasone with an albuterol inhaler and was doing well until this past weekend when his SOB worsened. Patient underwent a CTA and was found to have left lower pulmonary artery defects/emboli and started on therapeutic dose of lovenox. Patient has had no history of blood clots in the past, no family history of clotting disease, no injury, no chronic medical illnesses. Patient does not smoke. Patient refused remdesivir, but was agreeable to steroids. Patient was admitted to the hospital for COVID19 infection with hypoxia and pulmonary embolism. Patient was initially started on therapeutic lovenox and 15L non-rebreather oxygen. Patient also had RT increasing pulmonary toilet and started on 6mg dexamethasone daily. Patient was doing well for a few days and was to be switched to eliquis for discharge but on 10/28 patient developed tachycardia and abx were added, doxycycline and rocephin. Patient given fluids per sepsis recommendations. Blood and sputum cultures collected, preliminary on cultures showed gram positive cocci with final culture/sensitivies pending. Overnight, patient's respiratory status worsened and he required CPAP. He did not tolerate high flow O2 therapy. On CPAP patient had panic and anxiety with respirations into the 30s but responded well to ativan. From 10/29-10/30 patient continued to worsen and was attempted to be proned. It was determined on 10/30 patient required intubation.      Patient was transferred to [facility] ICU for management of COVID19 infection, PE, and new onset PNA. PAST HISTORY:   No past medical history on file. No past surgical history on file. SocialHistory:       Alcohol:  Illicit drugs: no use  Tobacco:  None    Family History:  No family history on file. MEDICATIONS:     No current facility-administered medications on file prior to encounter. No current outpatient medications on file prior to encounter. Scheduled Meds:   sodium chloride flush  5-40 mL IntraVENous 2 times per day    meropenem  1,000 mg IntraVENous Q8H    famotidine (PEPCID) injection  20 mg IntraVENous BID    dexamethasone  6 mg IntraVENous Daily      Continuous Infusions:   propofol      fentaNYL      sodium chloride       PRN Meds:sodium chloride flush, sodium chloride, ondansetron **OR** ondansetron, polyethylene glycol, acetaminophen **OR** acetaminophen    Allergies: Not on File    REVIEW OF SYSTEMS:       History obtained from chart review    Review of Systems   Unable to perform ROS: Intubated       PHYSICAL EXAM:       Vitals: Pulse 140   Temp 100.8 °F (38.2 °C) (Axillary)   Resp (!) 35   SpO2 92%     I/O:  No intake or output data in the 24 hours ending 10/31/21 0610  No intake/output data recorded. No intake/output data recorded. Physical Examination:     Physical Exam  Constitutional:       General: He is not in acute distress. Appearance: He is obese. He is not ill-appearing or toxic-appearing. HENT:      Head: Normocephalic and atraumatic. Right Ear: External ear normal.      Left Ear: External ear normal.      Nose: Nose normal.      Mouth/Throat:      Mouth: Mucous membranes are dry. Cardiovascular:      Rate and Rhythm: Regular rhythm. Tachycardia present. Heart sounds: Normal heart sounds. No murmur heard. No friction rub. No gallop. Pulmonary:      Effort: No respiratory distress. Breath sounds: No stridor. Comments:  On ventilator    Abdominal:      General: There is no distension. Palpations: There is no mass. Tenderness: There is no abdominal tenderness. There is no guarding. Hernia: No hernia is present. Musculoskeletal:      Right lower leg: No edema. Left lower leg: No edema. Skin:     General: Skin is warm and dry. Neurological:      Comments: On sedation           Access:   -Central Access Day #: pre-existing, but unsure of duration                                   -Peripheral Access Day#:pre-existing, but unsure of duration   -Arterial line Day#: pre-existing, but unsure of duration                                 Tenorio Day#: pre-existing, but unsure of duration   NGT Day#:                                             ETT Day#:1  Vent Settings: Vent Mode: AC/PRVC Rate Set: 30 bmp/Vt Ordered: 500 mL/ /FiO2 : 100 %    No results for input(s): PHART, GTA7WKW, PO2ART in the last 72 hours. DATA:       Labs:  CBC: No results for input(s): WBC, HGB, HCT, PLT in the last 72 hours. BMP: No results for input(s): NA, K, CL, CO2, BUN, CREATININE, GLUCOSE, PHOS in the last 72 hours. Invalid input(s):  CA  LFT's: No results for input(s): AST, ALT, ALB, BILITOT, ALKPHOS in the last 72 hours. Troponin: No results for input(s): TROPONINI in the last 72 hours. BNP:No results for input(s): BNP in the last 72 hours. ABGs: No results for input(s): PHART, ESD8AGC, PO2ART in the last 72 hours. INR: No results for input(s): INR in the last 72 hours. U/A:No results for input(s): NITRITE, COLORU, PHUR, LABCAST, WBCUA, RBCUA, MUCUS, TRICHOMONAS, YEAST, BACTERIA, CLARITYU, SPECGRAV, LEUKOCYTESUR, UROBILINOGEN, BILIRUBINUR, BLOODU, GLUCOSEU, AMORPHOUS in the last 72 hours.     Invalid input(s): KETONESU    XR CHEST PORTABLE    (Results Pending)   XR ABDOMEN (KUB) (SINGLE AP VIEW)    (Results Pending)       EKG:   Echo:  Micro:     ASSESSMENT AND PLAN:     Acute respiratory failure 2/2 to COVID19 infection; HAP; pulmonary embolism  SOB started around 10/16, Patient diagnosed with COVID around 10/25  Patient started on Doxycycline and rocephin at previous hospital  Previous Sputum and Blood cultures preliminary read of gram positive cocci  Pt intubated on 10/30.  Patient febrile at 100.8 on admission.   -Patient sedated on propofol and fentanyl  ABG - 7.17/67.2/75.4/24  F/u ABG at 0700  Vent settings: FiO2 100, , RR 30, PEEP 16  - f/u CXR, KUB  - continue dexamethasone 6mg QD  - Procal pending  - lactate 1.54  - Started on Vancomycin and merrem  - Obtain BCx2, sputum culture pending  Pulmonary Embolism  - Patient started on therapeutic lovenox 90mg BID    Hyperglycemia - 150 on admission  - POCT QID  - Hypoglycemia protocol  - LDSSI          Code Status:Full Code  FEN: NPO  PPX:  Lovenox, pepcid  DISPO: ICU    This patient has been staffed and discussed with Kizzy Godinez MD.   -----------------------------  Gely Birmingham DO, PGY-1  10/31/2021  6:10 AM

## 2021-11-01 NOTE — CONSULTS
Comprehensive Nutrition Assessment    RECOMMENDATIONS:  1. PO Diet: Continue NPO  2. Nutrition Support:  a. Propofol meeting 70% of calorie needs (28.4mL/h providing ~749kcal)  Recommend EN formula Peptide-Based High Protein  Vital High Protein  @ goal rate 10 ml/hr to provide 240 ml total volume, 240 kcal, 21 g protein and 200ml free water. Initiate EN @10mL/hr and as tolerated   Recommend maintenance water flushes of 30mL q4 or defer to MD  Recommend protein modular TID via feeding tube. Flush with 30 ml water before/after administration. Do not mix with tube feeding formula. NUTRITION ASSESSMENT:   Type and Reason for Visit:  Type and Reason for Visit: Initial, Consult   Nutritional summary & status: RD consult for tube feed ordering and management. Pt intubated 10/30. No pressors. Propofol @ 28. 4mL/h providing 749kcal/d. TF Recommendations above. Patient admitted d/t SOB - COVID-19 positive     PMH significant for: No PMHx listed in EMR    MALNUTRITION ASSESSMENT  Context of Malnutrition: Acute Illness   Malnutrition Status: No malnutrition      NUTRITION DIAGNOSIS   · Inadequate oral intake related to impaired respiratory function as evidenced by intubation      [address] and/or Nutrient Delivery:  Continue NPO, Start Tube Feeding  Nutrition Education/Counseling:  No recommendation at this time   Goals:  Pt to tolerate most appropriate form of nutrition to meet 100% of nutritional needs.        Nutrition Monitoring and Evaluation:   Food/Nutrient Intake Outcomes:  Enteral Nutrition Intake/Tolerance, Diet Advancement/Tolerance  Physical Signs/Symptoms Outcomes:  Biochemical Data, Constipation, Fluid Status or Edema, Hemodynamic Status, Skin, Weight     OBJECTIVE DATA: Significant to nutrition assessment  · Nutrition-Focused Physical Findings: Nutrition Related Findings: Loose BM 10/31   · Labs: Reviewed;   · Meds: Reviewed;  · Wounds: Wound Type: None     CURRENT NUTRITION THERAPIES  Diet NPO  ADULT TUBE FEEDING; Nasogastric; Peptide Based High Protein; Continuous; 10; No; 30; Q 4 hours; Protein; TID via feeding tube; flush w/ 30mL before/after administration; do not mix with tube feed     · Goal TF & Flush Orders Recommendations: TF recommendation: Vital High Protein @ goal rate 10mL/h with maintenance flushes of 30mL q4 and proteinex TID. Formula to provide 240mL total volume, 240kcal, 21g protein, 200mL free water (+300kcal and 90g protein from proteinex)    PO Intake: Average Meal Intake: NPO       ANTHROPOMETRICS  Current Height: 5' 6\" (167.6 cm)  Current Weight: 211 lb 13.8 oz (96.1 kg)    Admission weight: 208 lb 12.4 oz (94.7 kg)  Ideal Body Weight (lbs) (Calculated): 142 lbs (Ideal Body Weight (Kg) (Calculated): 65 kg)      BMI BMI (Calculated): 34.3    Wt Readings from Last 50 Encounters:   11/01/21 211 lb 13.8 oz (96.1 kg)       COMPARATIVE STANDARDS  Energy (kcal):  1057-1345kcal/kg; Weight Used for Energy Requirements:  Current (96.1kg)     Protein (g):  129g (2g/kg IBW); Weight Used for Protein Requirements:  Ideal        Fluid (ml/day):  2400-2800mL; Method Used for Fluid Requirements:  ml/Kg      The patient will still be monitored per nutrition standards of care. Consult dietitian if nutrition interventions essential to patient care is needed.      Eric Moreno [address], [address]  [place]:  [phone]  Office:  [phone]

## 2021-11-01 NOTE — ACP (ADVANCE CARE PLANNING)
Advance Care Planning     Advance Care Planning Inpatient Note  Spiritual Care Department    Today's Date: 2021  Unit: [facility] ICU    Received request from rounding. Upon review of chart and communication with care team, Spiritual Care will defer advance care planning with patient at this time. Marina Sierra Spouse was/were present in the room during visit. (pt intubated. Spoke w/ spouse by phone. She is NOK/ HCPOA)    Goals of ACP Conversation:  Discuss advance care planning documents    Health Care Decision Makers:       Primary Decision Maker: Philipp Akbar 37    Summary:  Fredy Calderon  Documented Next of Kin, per patient report/ per wife's report    Skm[address]rivannd 52 (Patient Wishes):  None     Assessment:  Pt and family are [address] Pt's wife is well supported by family and [employer]. Values prayer and believes that \"God is in charge\". Very hopeful about 's recovery. Quite a few people in extended family got covid (many were out of town- pt could not have contracted COVID from them). Kendra had a mild case of COVID. A relative  of it. Arlin Pradhan is dealing with it a lot and is scared by how serious it can be. Kendra said that she and  had talked about completing Living Will and HCPOA paperwork, and they know how important it is. I reassured her that due to [address], she is HCPOA, so not to worry too much about it at this time.      Interventions:  Discussed and provided education on state decision maker hierarchy    Care Preferences Communicated:   No    Outcomes/Plan:  ACP Discussion: Completed  Teach Back Method used to verify the patient's and/or Healthcare Decision Maker's understanding of key information in the advance directive documents    Electronically signed by Ruma Toure [facility] on 2021 at 9:45 AM

## 2021-11-01 NOTE — PROGRESS NOTES
Clinical Pharmacy Progress Note    Vancomycin - Management by Pharmacy    Consult Date(s): 10/31  Consulting Provider(s): Dr Artur Patiño / Plan    1. HAP,  Bacteremia (per OSH culture) - Vancomycin   Concurrent Antimicrobials: None.  Day of Vanc Therapy: continued from OSH, unclear when vanc started   Current Dosing Method: Intermittent   Therapeutic Goal: 15-20 mcg/mL   Current Dose / Frequency: Intermittent dosing   Plan / Rationale:  o SCr increased from 1.2 -> 1.8 -> 2.0 mg/dL this AM; UOP 0.6 mL/kg/hr. Given TRINIDAD, will switch to intermittent dosing at this time. o Scheduled vancomycin dose last given 11/1 @ 02:42. Will hold off on giving any further doses today. Random level ordered for tomorrow AM.      Betty Mackey Will continue to monitor clinical condition and make adjustments to regimen as appropriate. Thank you for consulting Pharmacy! Darleen CostelloD, [facility]  Wireless: [phone]  11/1/2021 7:58 AM      Interval Update: Patient remains intubated, sedated, and paralyzed. SCr increased this AM.     Subjective/Objective: Mr. Roque Salvador is a 45 y.o. male with no charted PMHx at this time. Patient started with COVID sx on 10/16, and diagnosed with +COVID test on 10/21. Admitted to OSH (in [place]) on 10/26 with SOB requiring supplemental O2; placed on dexamethasone, doxycycline and CTX per notes for PNA. Found to have acute PE. Respiratory function worsened,and pt was intubated 10/30 and transferred to [place] [address]. Blood cx + with Staph and respiratory cx with GPC. Pharmacy has been consulted to dose vancomycin per  [address] [facility]. Height:   Ht Readings from Last 1 Encounters:   10/31/21 5' 6\" (1.676 m)     Weight:   Wt Readings from Last 1 Encounters:   11/01/21 211 lb 13.8 oz (96.1 kg)       Level(s) / Doses:  Date Time Dose Level / Type of Level Interpretation   10/31 0555  Random = 23.6 mcg/ml · Drawn upon admission to [place] [address]. · Per ICU Rn, no MAR from OSH sent.   Unclear prior vanc dosing. 10/31 1213  Random = 16.2 mcg/mL · Drawn ~6 hrs after last random level. · Calculated half-life ~12 hrs. Note: Serum levels collected for AUC-based dosing may be high if collected in close proximity to the dose administered. This is not necessarily an indicator of toxicity. Cultures & Sensitivities:    Date Site Micro Susceptibility / Result   10/31 Blood x2 Ordered      Labs / Ancillary Data:    Estimated Creatinine Clearance: 47 mL/min (A) (based on SCr of 2 mg/dL (H)).     Recent Labs     10/31/21  0555 10/31/21  1814 11/01/21  0447   CREATININE 1.2 1.8* 2.0*   BUN 28* 42* 53*   WBC 29.6*  --  21.0*       Additional Lab Values / Findings of Note:   Procalcitonin (10/31) = 1.71 (10/31) -> 2.11 (11/1)

## 2021-11-01 NOTE — PLAN OF CARE
Problem: Infection:  Goal: Will remain free from infection  Description: Will remain free from infection  Outcome: Ongoing     Problem: Safety:  Goal: Free from accidental physical injury  Description: Free from accidental physical injury  Outcome: Ongoing     Problem: Skin Integrity:  Goal: Absence of new skin breakdown  Description: Absence of new skin breakdown  Outcome: Ongoing     Problem: Falls - Risk of:  Goal: Will remain free from falls  Description: Will remain free from falls  Outcome: Ongoing     Problem: Nutrition  Goal: Optimal nutrition therapy  11/1/2021 1752 by Maggy Acevedo RN  Outcome: Ongoing

## 2021-11-01 NOTE — PROGRESS NOTES
Nephrology Consult Note  PGY 3  Internal Medicine    Chief Complaint: shortness of breath    History Obtained From:  patient    HISTORY OF PRESENT ILLNESS:   Mr. Mandi Hagen is a 45year old male who presents as a transfer from [facility] with respiratory failure secondary to COVID-19 pneumonia. Patient was originally diagnosed on 10/16 with Covid and treated with antibiotics Decadron as an outpatient. Evaluations therapy and was hospitalized on 1026 for worsening shortness of breath. CTA at this time showed a left lower lobe pulmonary embolism for which the patient was originally treated with Lovenox and then transition to Eliquis. Patient also was being treated for gram-positive cocci that were detected with Rocephin and doxy. Patient eventually intubated on 10/30 due to respiratory failure. He was then transferred to the [facility] for higher level of care for acute respiratory failure with hypoxia secondary to COVID-19, pulmonary embolism and suspected HCAP. Patient has had worsening of his creatinine and nephrology has been consulted. PAST HISTORY:     Past Medical History:    · No past medical history on file. Past Surgical History:    · No past surgical history on file. Medications Priorto Admission:    · No medications prior to admission. Allergies:  Shellfish-derived products    Social History:   · TOBACCO:   has no history on file for tobacco use. · ETOH:   has no history on file for alcohol use. · DRUGS : none  · Patient currently lives at home    Family History:   · No family history on file. Review of Systems   Unable to perform ROS: Intubated       ROS: A 10 point review of systems was conducted, significant findings as noted in HPI. Physical Exam:     Vitals:    11/01/21 0815   BP:    Pulse: 95   Resp:    Temp:    SpO2: 95%     Physical Exam  Vitals reviewed. Constitutional:       General: He is not in acute distress.      Appearance: He is well-developed and well-groomed. HENT:      Head: Normocephalic and atraumatic. Mouth/Throat:      Mouth: Mucous membranes are moist.      Pharynx: Oropharynx is clear. Eyes:      General: No scleral icterus. Extraocular Movements: Extraocular movements intact. Conjunctiva/sclera: Conjunctivae normal.      Pupils: Pupils are equal, round, and reactive to light. Cardiovascular:      Rate and Rhythm: Normal rate and regular rhythm. Pulses: Normal pulses. Heart sounds: Normal heart sounds, S1 normal and S2 normal. No murmur heard. Pulmonary:      Effort: Pulmonary effort is normal. No respiratory distress. Breath sounds: Normal breath sounds and air entry. Comments: Intubated and mechanically ventilated  Abdominal:      General: Abdomen is flat. Bowel sounds are normal.      Palpations: Abdomen is soft. Tenderness: There is no abdominal tenderness. Musculoskeletal:         General: Normal range of motion. Cervical back: Full passive range of motion without pain, normal range of motion and neck supple. Skin:     General: Skin is warm and dry. Neurological:      General: No focal deficit present. Mental Status: He is alert and oriented to person, place, and time. Cranial Nerves: Cranial nerves are intact. Sensory: Sensation is intact. Motor: Motor function is intact. Coordination: Coordination is intact. Gait: Gait is intact. Deep Tendon Reflexes: Reflexes are normal and symmetric. Psychiatric:         Attention and Perception: Attention and perception normal.         Mood and Affect: Mood normal.         Speech: Speech normal.         Behavior: Behavior normal. Behavior is cooperative. Thought Content:  Thought content normal.         Cognition and Memory: Cognition and memory normal.         Judgment: Judgment normal.         Data:   Labs:  CBC:   Recent Labs     10/31/21  0555 10/31/21  0800 11/01/21  0447   WBC 29.6*  --  21.0*   HGB 12.9*  --  11.6*   HCT 40.1*  --  36.7*   PLT 62* 62* 70*       BMP:   Recent Labs     10/31/21  0555 10/31/21  1814 11/01/21 0447    140 140   K 5.1 5.4* 4.7    108 107   CO2 21 22 21   BUN 28* 42* 53*   CREATININE 1.2 1.8* 2.0*   GLUCOSE 141* 194* 209*   PHOS  --  4.8 4.9     LFT's:   Recent Labs     10/31/21  0555 11/01/21 0447   AST 43* 31   ALT 52* 38   BILITOT 1.0 0.4   ALKPHOS 168* 124     Troponin: No results for input(s): TROPONINI in the last 72 hours. BNP:No results for input(s): BNP in the last 72 hours. ABGs:   Recent Labs     10/31/21  2105 11/01/21 0447   PHART 7.185* 7.179*   MJP0NSQ 64.7* 64.7*   PO2ART 97.1 112.0*     INR: No results for input(s): INR in the last 72 hours. U/A:No results for input(s): NITRITE, COLORU, PHUR, LABCAST, WBCUA, RBCUA, MUCUS, TRICHOMONAS, YEAST, BACTERIA, CLARITYU, SPECGRAV, LEUKOCYTESUR, UROBILINOGEN, BILIRUBINUR, BLOODU, GLUCOSEU, AMORPHOUS in the last 72 hours. Invalid input(s): KETONESU    XR CHEST PORTABLE   Final Result   1. No interval change   2. No evidence of pneumothorax             XR CHEST PORTABLE   Final Result      1. ET tube about 3 cm above the dayna. 2.  Stable small left apical pneumothorax. 3.  Stable appearance of diffuse   4. Stable appearance of diffuse new mediastinal and thoracic inlet soft tissue gas. XR CHEST PORTABLE   Final Result      1. Small left apical pneumothorax. Pneumomediastinum. Soft tissue gas in the neck and chest wall. Findings suggestive of barotrauma. 2.  ET tube 5 cm above the dayna. 3.  Enteric tube tip in the distal stomach. 4.  Extensive bilateral airspace disease compatible with severe COVID-19 pneumonia and/or ARDS. Critical results reported to ICU nurse, Savana Perla, 7:40 AM on 10/31/21. XR ABDOMEN (KUB) (SINGLE AP VIEW)   Final Result      1. Small left apical pneumothorax. Pneumomediastinum. Soft tissue gas in the neck and chest wall.  Findings suggestive of barotrauma. 2.  ET tube 5 cm above the dayna. 3.  Enteric tube tip in the distal stomach. 4.  Extensive bilateral airspace disease compatible with severe COVID-19 pneumonia and/or ARDS. Critical results reported to ICU nurse, Zachery Edil, 7:40 AM on 10/31/21. XR CHEST PORTABLE    (Results Pending)         ASSESSMENT AND PLAN:       Acute kidney injury  Patient is critically ill with COVID-19, pulmonary embolism, and likely HCAP. Currently being treated in the ICU. Has a rising creatinine. Now 2.0. Intubated, sedated and on paralytic (vecuronium)  · Maintain blood pressure to keep map over 65  · Avoid nephrotoxic agents  · Strict I's and O  · Daily renal panel  · If patient has acutely worsening renal function or decreased/cessation of urine output please contact nephrology    Acute hypoxic respiratory failure secondary to severe ARDS. Patient has COVID-19 pneumonia, with the PE and likely HCAP. Intubated, sedated and paralyzed in the ICU.   · Continue management per ICU team    Sepsis secondary to COVID-19 pneumonia and bacteremia  Patient is COVID-19 pneumonia and a secondary bacterial infection  · On antibiotics and treatment per primary team      Code Status: Full  DISPO: ICU    This patient has been staffed and discussed with [place]  -----------------------------  Taylor Baird MD, PGY-3  11/1/2021  8:56 AM        Agree with plan above     Zev Perry MD

## 2021-11-01 NOTE — FLOWSHEET NOTE
11/01/21 0942   Encounter Summary   Services provided to: Family  (pt's wife Kendra/ NOK/ HCPOA)   Continue Visiting   (es 11/1 acp)   Complexity of Encounter High   Length of Encounter 15 minutes   Advance Care Planning Yes   Routine   Type Initial   Assessment Calm; Approachable; Hopeful;Coping   Intervention Active listening;Explored feelings, thoughts, concerns;Explored coping resources;Nurtured hope;Prayer;Discussed relationship with God;Discussed belief system/Taoist practices/marcellus;Discussed illness/injury and it's impact   Outcome Expressed feelings of farrah, peace, and/or awe;Engaged in conversation; Hopeful;Receptive   Primary Decision Curryegsury (Healthcare Proxy)   [address] [facility] is: Legal Next of Kin  (wife [facility])   ACP conversation by phone with pt's wife, [facility].  Details in ACP note.

## 2021-11-01 NOTE — PLAN OF CARE
Problem: Nutrition  Goal: Optimal nutrition therapy  11/1/2021 1055 by Avtar Boyer MS, RD, LD  Outcome: Ongoing  11/1/2021 1055 by Avtar Boyer MS, RD, LD  Note: Nutrition Problem #1: Inadequate oral intake  Intervention: Food and/or Nutrient Delivery: Continue NPO, Start Tube Feeding  Nutritional Goals: Pt to tolerate most appropriate form of nutrition to meet 100% of nutritional needs.

## 2021-11-01 NOTE — PLAN OF CARE
Problem: Non-Violent Restraints  Goal: Removal from restraints as soon as assessed to be safe  Outcome: Ongoing  Goal: No harm/injury to patient while restraints in use  Outcome: Ongoing  Goal: Patient's dignity will be maintained  Outcome: Ongoing     Problem: Skin Integrity:  Goal: Skin integrity will stabilize  Description: Skin integrity will stabilize  Outcome: Ongoing     Problem: Discharge Planning:  Goal: Patients continuum of care needs are met  Description: Patients continuum of care needs are met  Outcome: Ongoing     Problem: Falls - Risk of:  Goal: Will remain free from falls  Description: Will remain free from falls  Outcome: Ongoing  Goal: Absence of physical injury  Description: Absence of physical injury  Outcome: Ongoing

## 2021-11-01 NOTE — CARE COORDINATION
pick-up or delivery - cash, check, or card accepted)     Able to afford home medications/ co-pay costs: Yes    ADLS  Support Systems:      PT AM-PAC:   /24  OT AM-PAC:   /24    HOUSING  Home Environment: home w/wife  Steps:     Plans to RETURN to current housing: No  Barriers to RETURNING to current housing: NA    Home Care Information  Currently ACTIVE with [address]: No    Durable Medical Equipment  DME Provider: NABIL  Equipment: NA    Home Oxygen and Respiratory Equipment  Has HOME OXYGEN prior to admission: No      DISCHARGE PLAN:  Disposition:  TBD    Transportation PLAN for discharge:  TBD    Factors facilitating achievement of predicted outcomes: Family support    Barriers to discharge: Medical complications    Additional Case Management Notes:  Pt from home with wife, Jason Fitch in [place], [place]. CM spoke with Kendra via phone today. She reported that pt was independent prior to admission, no services or DME in the past.  She inquired about [facility] paperwork being completed. CM offered fax number. She reported that she is staying in a hotel in [place], and was planning to give paperwork to [employer]. CM also provided CM's direct contact info. Kendra confirmed that she has been in touch with RNs about pt's condition, and has no other concerns at this time. CM will continue to follow for any case mgmt/DC needs. The Plan for Transition of Care is related to the following treatment goals of Pneumonia due to COVID-19 virus [U07.1, J12.82]    The Patient and/or patient representative Tony Cordon and his family were provided with a choice of provider and agrees with the discharge plan Yes    Freedom of choice list was provided with basic dialogue that supports the patient's individualized plan of care/goals and shares the quality data associated with the providers.  Yes    Care Transition patient: No    CONTRERAS Castellanos, [facility] [employer] [facility]  Case Management Department  Ph: [phone]   Fax: [phone]

## 2021-11-01 NOTE — PROGRESS NOTES
Patient has been admitted to the ICU, patient is Covid positive, intubated. To minimize the exposure, and preserve the PPE patient was not seen physically. We will defer the management to intensive care unit team.      Once the patient is Covid negative/out of the ICU will assume care. Please call with questions.       Divina Uriostegui MD

## 2021-11-01 NOTE — PROGRESS NOTES
BUN/Cr levels now 42 and 1.8. Tenorio in place. Beata/ tea color urine with sediment. 150 ml out this shift. ABG drawn - results below. ICU residents notified of urine and lab results. Results for Jorge A Marinelli (MRN [id number]) as of 10/31/2021 22:22   Ref. Range 10/31/2021 21:05   Hemoglobin, Art, Extended Latest Units: g/dL 12.00   pH, Arterial Latest Ref Range: 7.350 - 7.450  7.185 (LL)   pCO2, Arterial Latest Ref Range: 35.0 - 45.0 mmHg 64.7 (H)   pO2, Arterial Latest Ref Range: 75.0 - 108.0 mmHg 97.1   HCO3, Arterial Latest Ref Range: 21 - 29 mmol/L 24   TCO2 (calc), Art Latest Units: mmol/L 26   Base Excess, Arterial Latest Ref Range: -3.0 - 3.0 mmol/L -4.8 (L)     Kendra, pt's wife, updated via phone.

## 2021-11-01 NOTE — PROGRESS NOTES
W Bypass Day: 3  ICU Day: 3                                                        Code:Full Code  Admit Date: 10/31/2021  PCP: No primary care provider on file. CC: COVID PNA    INTERVAL HISTORY:  No acute overnight events. Patient intubated and sedated. Vent day 3. Afebrile, Tachycardic in NSR, I/Os 1583/1400. ABG 7.179/64.7/112. On fentanyl 150, 50 propofol, 0.5 vecuronium. Patient's creatinine trending up since hospitalization. Inpatient consult to nephrology sent. HISTORY OF PRESENT ILLNESS:     57-year-old male presented to ER on [place], [place] for shortness of breath on 10/25. Patient experiencing shortness of breath on 10/16, was diagnosed with Covid on 10/21 and was put on antibiotics and dexamethasone with albuterol inhaler. However his symptoms worsened which prompted the ED visit in the ED CTA showed nonocclusive pulmonary embolism in the anterior portion of the left lower lobe with filling defects extending to the several proximal segment of the left lower lobe. During the admission at the outside hospital patient refused remdesivir, Actemra but was agreeable to steroids. Patient was initially on 15 L nonrebreather however oxygenation worsened as the days progressed and patient was unable to tolerate high flow O2 therapy and CPAP secondary to anxiety. Outside hospital attempted proning however did not help with oxygenation. Decision was made to intubate the patient and transfer to the [facility].    Patient was initially started on doxycycline and azithromycin for possible superimposed pneumonia. Infectious work-up from outside hospital shows nonspeciated staph in his blood and gram-positive cocci in this respiratory culture. PAST HISTORY:   No past medical history on file. No past surgical history on file.     SocialHistory:   The patient lives at home  Alcohol: unknown  Illicit drugs: no use  Tobacco: none light. Cardiovascular:      Rate and Rhythm: Regular rhythm. Tachycardia present. Pulses: Normal pulses. Heart sounds: Murmur heard. Pulmonary:      Effort: Pulmonary effort is normal. No respiratory distress. Breath sounds: Wheezing and rhonchi present. Abdominal:      General: Bowel sounds are normal. There is no distension. Palpations: Abdomen is soft. Musculoskeletal:         General: No swelling. Recent Labs     10/31/21  2105 11/01/21  0447   PHART 7.185* 7.179*   [id number] 64.7* 64.7*   PO2ART 97.1 112.0*     DATA:   Labs:  CBC:   Recent Labs     10/31/21  0555 10/31/21  0800 11/01/21 0447   WBC 29.6*  --  21.0*   HGB 12.9*  --  11.6*   HCT 40.1*  --  36.7*   PLT 62* 62* 70*       BMP:   Recent Labs     10/31/21  0555 10/31/21  1814 11/01/21 0447    140 140   K 5.1 5.4* 4.7    108 107   CO2 21 22 21   BUN 28* 42* 53*   CREATININE 1.2 1.8* 2.0*   GLUCOSE 141* 194* 209*   PHOS  --  4.8 4.9     LFT's:   Recent Labs     10/31/21  0555 11/01/21  0447   AST 43* 31   ALT 52* 38   BILITOT 1.0 0.4   ALKPHOS 168* 124     Troponin: No results for input(s): TROPONINI in the last 72 hours. BNP:No results for input(s): BNP in the last 72 hours. ABGs:   Recent Labs     10/31/21  2105 11/01/21  0447   PHART 7.185* 7.179*   CEG7LOP 64.7* 64.7*   PO2ART 97.1 112.0*     INR: No results for input(s): INR in the last 72 hours. U/A:No results for input(s): NITRITE, COLORU, PHUR, LABCAST, WBCUA, RBCUA, MUCUS, TRICHOMONAS, YEAST, BACTERIA, CLARITYU, SPECGRAV, LEUKOCYTESUR, UROBILINOGEN, BILIRUBINUR, BLOODU, GLUCOSEU, AMORPHOUS in the last 72 hours. Invalid input(s): KETONESU    XR CHEST PORTABLE   Final Result   1. No interval change   2. No evidence of pneumothorax             XR CHEST PORTABLE   Final Result      1. ET tube about 3 cm above the dayna. 2.  Stable small left apical pneumothorax. 3.  Stable appearance of diffuse   4.   Stable appearance of diffuse new mediastinal and thoracic inlet soft tissue gas. XR CHEST PORTABLE   Final Result      1. Small left apical pneumothorax. Pneumomediastinum. Soft tissue gas in the neck and chest wall. Findings suggestive of barotrauma. 2.  ET tube 5 cm above the dayna. 3.  Enteric tube tip in the distal stomach. 4.  Extensive bilateral airspace disease compatible with severe COVID-19 pneumonia and/or ARDS. Critical results reported to ICU nurseSavana, 7:40 AM on 10/31/21. XR ABDOMEN (KUB) (SINGLE AP VIEW)   Final Result      1. Small left apical pneumothorax. Pneumomediastinum. Soft tissue gas in the neck and chest wall. Findings suggestive of barotrauma. 2.  ET tube 5 cm above the dayna. 3.  Enteric tube tip in the distal stomach. 4.  Extensive bilateral airspace disease compatible with severe COVID-19 pneumonia and/or ARDS. Critical results reported to ICU nurseSavana, 7:40 AM on 10/31/21. XR CHEST PORTABLE    (Results Pending)     ASSESSMENT AND PLAN:     Respiratory:   Acute hypoxic respiratory failure requiring intubation   Severe ARDS  Sedated and paralysed 2/2 increased work of breathing  COVID-19 PNA, day 17 since symptom onset  - Patient diagnosed on 10/21/2021  Vent Mode: PRVC Rate Set: 33 bmp/Vt Ordered: 500 mL/ /FiO2 : 80 %  - Sedated and paralysed with propofol, fentanyl and vecuronium.   - Decadron 20 mg ( day 3)  - continue full dose lovenox  - ABG 7.179/64.7/112.0/24. Will attempt proning     Small left apical PTX  Pneumomediastinum   - No intervention needed at this moment  - Daily CXR - stable today     Pulmonary embolism  - on therapeutic lovenox    ID:    Bactermia, unspeciated staph  Sepsis   - Outside hospital blood cx grew staph (unspeciated), resp cultures also had gram +ve cocci  - Vanc for sepsis. Not in shock. Follow infectious workup  - Elevated pro-calcitonin 2.11    Cardiac:  Sinus tachycardia  - monitor.      Renal:  TRINIDAD  - Monitor UOP   - Trend Cr 2.0>1.8>1.4  - Inpatient consult to Nephrology    GI:  Elevated liver enzymes  - Trend daily as pt is on vec  - No stool. Code Status:Full Code  FEN: NPO  PPX: Lovenox 80 BID  DISPO: ICU    This patient will be staffed and discussed with Dr Tatianna Golden MD  -----------------------------  Eden Michelle MD, PGY-1  11/1/2021  8:20 AM    Patient was seen, examined and discussed with Dr. Cadnece Rivera. I agree with the interval history. My physical exam confirms the findings listed below  Chart was reviewed including labs and medical records confirm the findings noted    Acute respiratory failure on mechanical vent support. , RR 33, FiO2 80% PEEP 14  Peak pressure 38. Had small left apical pneumothorax. Did not require chest tube. This is not visible on this morning's CXR   ABG 7.179/64/112  Now paralyzed   COVID19 pneumonia   TRINIDAD - creatinine is trending up. UOP 1400 mL over the past 24 hours. Leukocytosis down from 29 to 21  ?staph bacteremia from the other hospital.  On vanc. Change VT to 400, RR to 42, decrease PEEP to 12. Peak pressure improved with these changes to 32.  sats remained at 95 and minute ventilation increased from 16.6 to 16.8. There was no significant change in PEEPi  ABG in one hour. Start TF    Pt has a high probability of imminent or life-threatening deterioration requiring close monitoring, and highly complex decision-making and/or interventions of high intensity to assess, manipulate, and support his critical organ systems to prevent a likely inevitable decline which could occur if left untreated.      A total critical care time 35 minutes was used. This includes but not limited to examining patient, collaborating with other physicians, monitoring vital signs, telemetry, continuous pulse oximetry, and clinical response to IV medications, documentation time, review and interpretation of laboratory and radiological data, review of nursing notes and old record review.  This time excludes any time that may have been spent performing procedures for life threatening organ failure.

## 2021-11-02 NOTE — PROGRESS NOTES
Clinical Pharmacy Progress Note    Vancomycin - Management by Pharmacy    Consult Date(s): 10/31  Consulting Provider(s): Dr Wilson Ranks / Plan    1. HAP,  Bacteremia (per OSH culture) - Vancomycin   Concurrent Antimicrobials: None.  Day of Vanc Therapy: continued from OSH, unclear when vanc started   Current Dosing Method: Intermittent   Therapeutic Goal: 15-20 mcg/mL   Current Dose / Frequency: Intermittent dosing   Plan / Rationale:  o SCr increased to 3.1 mg/dL this AM; UOP 0.5 mL/kg/hr. Will continue to dose vancomycin intermittently via levels at this time. o Random vancomycin level this AM was 20.4 mcg/mL. Will continue to hold vancomycin today and will obtain a repeat random level tomorrow AM.        [facility] Will continue to monitor clinical condition and make adjustments to regimen as appropriate. Thank you for consulting Pharmacy! Mary Hassan, PharmD, [facility]  Wireless: [phone]  11/2/2021 7:50 AM      Interval Update: Patient remains intubated, sedated, and paralyzed. SCr continues to increase. Subjective/Objective: Mr. Eusebia Schulte is a 45 y.o. male with no charted PMHx at this time. Patient started with COVID sx on 10/16, and diagnosed with +COVID test on 10/21. Admitted to OSH (in [place]) on 10/26 with SOB requiring supplemental O2; placed on dexamethasone, doxycycline and CTX per notes for PNA. Found to have acute PE. Respiratory function worsened,and pt was intubated 10/30 and transferred to [facility]. Blood cx + with Staph and respiratory cx with GPC. Pharmacy has been consulted to dose vancomycin per Dr Anali Garcia. Height:   Ht Readings from Last 1 Encounters:   11/01/21 5' 6\" (1.676 m)     Weight:   Wt Readings from Last 1 Encounters:   11/01/21 211 lb 13.8 oz (96.1 kg)       Level(s) / Doses:  Date Time Dose Level / Type of Level Interpretation   10/31 0555  Random = 23.6 mcg/ml · Drawn upon admission to [facility]. · Per ICU Rn, no MAR from OSH sent.   Unclear prior vanc dosing. 10/31 1213  Random = 16.2 mcg/mL · Drawn ~6 hrs after last random level. · Calculated half-life ~12 hrs. 11/2 03:37 1g IV q12h -> intermittent dosing d/t TRINIDAD Random = 20.4 mcg/mL · Obtained ~25 hrs after the last dose  · Continue to hold dose   Note: Serum levels collected for AUC-based dosing may be high if collected in close proximity to the dose administered. This is not necessarily an indicator of toxicity. Cultures & Sensitivities:    Date Site Micro Susceptibility / Result   10/31 Blood x2 Ordered      Labs / Ancillary Data:    Estimated Creatinine Clearance: 30 mL/min (A) (based on SCr of 3.1 mg/dL (H)). Recent Labs     10/31/21  0555 10/31/21  1814 11/01/21  0447 11/01/21  1606 11/02/21  0337   CREATININE 1.2   < > 2.0* 2.5* 3.1*   BUN 28*   < > 53* 70* 90*   WBC 29.6*  --  21.0*  --  21.4*    < > = values in this interval not displayed.        Additional Lab Values / Findings of Note:   Procalcitonin (10/31) = 1.71 (10/31) -> 2.11 (11/1)

## 2021-11-02 NOTE — PROGRESS NOTES
Pt has ATN   Cr trending up   On TF   K is sightly elevated   Labs at 4 pm   Off pressors   On high oxygen req     Barbi Julien MD

## 2021-11-02 NOTE — PROGRESS NOTES
Patient has been admitted to the ICU, patient is Covid positive, intubated. To minimize the exposure, and preserve the PPE patient was not seen physically. We will defer the management to intensive care unit team.      Once the patient is Covid negative/out of the ICU will assume care. Please call with questions.       Kaiden Talamantes MD

## 2021-11-02 NOTE — PLAN OF CARE
Care plan reviewed.       Problem: Non-Violent Restraints  Goal: Removal from restraints as soon as assessed to be safe  11/1/2021 1752 by Maggy Acevedo RN  Outcome: Ongoing  Goal: No harm/injury to patient while restraints in use  11/1/2021 1752 by Maggy Acevedo RN  Outcome: Ongoing  Goal: Patient's dignity will be maintained  11/1/2021 1752 by Maggy Acevedo RN  Outcome: Ongoing     Problem: Infection:  Goal: Will remain free from infection  Description: Will remain free from infection  11/2/2021 0655 by Krupa Antonio RN  Outcome: Ongoing  11/1/2021 1752 by Maggy Acevedo RN  Outcome: Ongoing     Problem: Safety:  Goal: Free from accidental physical injury  Description: Free from accidental physical injury  11/1/2021 1752 by Maggy Acevedo RN  Outcome: Ongoing  Goal: Free from intentional harm  Description: Free from intentional harm  11/1/2021 1752 by Maggy Acevedo RN  Outcome: Ongoing

## 2021-11-02 NOTE — PROGRESS NOTES
W Bypass Day:3  ICU Day: 3                                                       Code:Full Code  Admit Date: 10/31/2021  PCP: No primary care provider on file. CC: COVID PNA    INTERVAL HISTORY:  No acute overnight events. Patient intubated and sedated. Vent day 3. Afebrile, Tachycardic in NSR, I/Os 885.8/1030. ABG 7.135/72.8/66.0. On fentanyl 200, 50 propofol, 0.5 vecuronium. Patient's creatinine trending up since hospitalization. Inpatient consult to nephrology sent. Patient would likely need a CRRT. HISTORY OF PRESENT ILLNESS:     67-year-old male presented to ER on [place], [place] for shortness of breath on 10/25. Patient experiencing shortness of breath on 10/16, was diagnosed with Covid on 10/21 and was put on antibiotics and dexamethasone with albuterol inhaler. However his symptoms worsened which prompted the ED visit in the ED CTA showed nonocclusive pulmonary embolism in the anterior portion of the left lower lobe with filling defects extending to the several proximal segment of the left lower lobe. During the admission at the outside hospital patient refused remdesivir, Actemra but was agreeable to steroids. Patient was initially on 15 L nonrebreather however oxygenation worsened as the days progressed and patient was unable to tolerate high flow O2 therapy and CPAP secondary to anxiety. Outside hospital attempted proning however did not help with oxygenation. Decision was made to intubate the patient and transfer to the [facility] [place].    Patient was initially started on doxycycline and azithromycin for possible superimposed pneumonia. Infectious work-up from outside hospital shows nonspeciated staph in his blood and gram-positive cocci in this respiratory culture. PAST HISTORY:   No past medical history on file. No past surgical history on file.     SocialHistory:   The patient lives at home  Alcohol: unknown  Illicit drugs: no use  Tobacco: none     Family History:  No family history on file. MEDICATIONS:     No current facility-administered medications on file prior to encounter. No current outpatient medications on file prior to encounter. Scheduled Meds:   heparin (porcine)  60 Units/kg IntraVENous Once    vancomycin (VANCOCIN) intermittent dosing (placeholder)   Other RX Placeholder    sodium chloride flush  5-40 mL IntraVENous 2 times per day    famotidine (PEPCID) injection  20 mg IntraVENous BID    dexamethasone  20 mg IntraVENous Q24H    Followed by   Sharif Kearney ON 11/5/2021] dexamethasone  10 mg IntraVENous Q24H    insulin lispro  0-18 Units SubCUTAneous Q6H      Continuous Infusions:   heparin (PORCINE) Infusion      dextrose      propofol 50 mcg/kg/min (11/02/21 0501)    fentaNYL 200 mcg/hr (11/02/21 0620)    sodium chloride      dextrose      vecuronium (NORCURON) infusion 0.5 mcg/kg/min (11/01/21 1623)     PRN Meds:heparin (porcine), heparin (porcine), glucose, dextrose, glucagon (rDNA), dextrose, sodium chloride flush, sodium chloride, ondansetron **OR** ondansetron, polyethylene glycol, acetaminophen **OR** acetaminophen, dextrose    Allergies: Allergies   Allergen Reactions    Shellfish-Derived Products Nausea And Vomiting       PHYSICAL EXAM:   Vitals: /75   Pulse 105   Temp 97.6 °F (36.4 °C) (Axillary)   Resp (!) 38   Ht 5' 6\" (1.676 m)   Wt 211 lb 13.8 oz (96.1 kg)   SpO2 91%   BMI 34.20 kg/m²     I/O:      Intake/Output Summary (Last 24 hours) at 11/2/2021 0801  Last data filed at 11/2/2021 0646  Gross per 24 hour   Intake 1538.83 ml   Output 1160 ml   Net 378.83 ml     No intake/output data recorded. I/O last 3 completed shifts: In: 1538.8 [I.V.:1356; NG/GT:134; IV Piggyback:48.9]  Out: 1160 [Urine:1160]    Physical Exam  Constitutional:       General: He is not in acute distress. Appearance: Normal appearance. He is normal weight.  He is not ill-appearing, toxic-appearing or diaphoretic. Comments: Intubated, sedated   Eyes:      Pupils: Pupils are equal, round, and reactive to light. Cardiovascular:      Rate and Rhythm: Regular rhythm. Tachycardia present. Pulses: Normal pulses. Heart sounds: Murmur heard. Pulmonary:      Effort: Pulmonary effort is normal. No respiratory distress. Breath sounds: Wheezing and rhonchi present. Abdominal:      General: Bowel sounds are normal. There is no distension. Palpations: Abdomen is soft. Musculoskeletal:         General: No swelling. Recent Labs     11/01/21  1606 11/01/21  2350   PHART 7.180* 7.135*   SGU7XXE 66.0* 72.8*   PO2ART 70.6* 66.0*     DATA:   Labs:  CBC:   Recent Labs     10/31/21  0555 10/31/21  0555 10/31/21  0800 11/01/21 0447 11/02/21 0337   WBC 29.6*  --   --  21.0* 21.4*   HGB 12.9*  --   --  11.6* 11.5*   HCT 40.1*  --   --  36.7* 35.0*   PLT 62*   < > 62* 70* 128*    < > = values in this interval not displayed. BMP:   Recent Labs     11/01/21 0447 11/01/21 0447 11/01/21  1606 11/01/21  1606 11/01/21 2000 11/02/21 0337 11/02/21  0553     --  140  --   --  142  --    K 4.7   < > 5.4*   < > 5.6* 5.2* 5.3*     --  107  --   --  108  --    CO2 21  --  20*  --   --  20*  --    BUN 53*  --  70*  --   --  90*  --    CREATININE 2.0*  --  2.5*  --   --  3.1*  --    GLUCOSE 209*  --  205*  --   --  212*  --    PHOS 4.9  --  5.7*  --   --  5.9*  --     < > = values in this interval not displayed. LFT's:   Recent Labs     10/31/21  0555 11/01/21 0447 11/02/21 0337   AST 43* 31 27   ALT 52* 38 40   BILITOT 1.0 0.4 0.6   ALKPHOS 168* 124 147*     Troponin: No results for input(s): TROPONINI in the last 72 hours. BNP:No results for input(s): BNP in the last 72 hours.   ABGs:   Recent Labs     11/01/21  1606 11/01/21  2350   PHART 7.180* 7.135*   BYR6QIP 66.0* 72.8*   PO2ART 70.6* 66.0*     INR: No results for input(s): INR in the last 72 hours.    U/A:No results for input(s): NITRITE, COLORU, PHUR, LABCAST, WBCUA, RBCUA, MUCUS, TRICHOMONAS, YEAST, BACTERIA, CLARITYU, SPECGRAV, LEUKOCYTESUR, UROBILINOGEN, BILIRUBINUR, BLOODU, GLUCOSEU, AMORPHOUS in the last 72 hours. Invalid input(s): KETONESU    XR CHEST PORTABLE   Final Result   Impression: Stable appearance of the chest. No discrete pneumothorax. XR CHEST PORTABLE   Final Result   1. No interval change   2. No evidence of pneumothorax             XR CHEST PORTABLE   Final Result      1. ET tube about 3 cm above the dayna. 2.  Stable small left apical pneumothorax. 3.  Stable appearance of diffuse   4. Stable appearance of diffuse new mediastinal and thoracic inlet soft tissue gas. XR CHEST PORTABLE   Final Result      1. Small left apical pneumothorax. Pneumomediastinum. Soft tissue gas in the neck and chest wall. Findings suggestive of barotrauma. 2.  ET tube 5 cm above the dayna. 3.  Enteric tube tip in the distal stomach. 4.  Extensive bilateral airspace disease compatible with severe COVID-19 pneumonia and/or ARDS. Critical results reported to ICU nurseCarmenza, 7:40 AM on 10/31/21. XR ABDOMEN (KUB) (SINGLE AP VIEW)   Final Result      1. Small left apical pneumothorax. Pneumomediastinum. Soft tissue gas in the neck and chest wall. Findings suggestive of barotrauma. 2.  ET tube 5 cm above the dayna. 3.  Enteric tube tip in the distal stomach. 4.  Extensive bilateral airspace disease compatible with severe COVID-19 pneumonia and/or ARDS. Critical results reported to ICU nurseCarmenza, 7:40 AM on 10/31/21.             XR CHEST PORTABLE    (Results Pending)     ASSESSMENT AND PLAN:     Respiratory:   Acute hypoxic respiratory failure requiring intubation   Severe ARDS  Sedated and paralysed 2/2 increased work of breathing  COVID-19 PNA, day 17 since symptom onset  - Patient diagnosed on 10/21/2021  Vent Mode: PRVC Rate Set: 38 bmp/Vt Ordered: 450 mL/ /FiO2 : 90 %  - Sedated and paralysed with propofol, fentanyl and vecuronium.   - Decadron 20 mg ( day 4)  - continue full dose lovenox  - ABG 7.135/72.8/66.0 @ 11 PM last night. Vent settings changed from 80% FiO2 to 90%. Small left apical PTX  Pneumomediastinum   - No intervention needed at this moment  - Daily CXR - stable today. Pulmonary embolism  - on therapeutic lovenox    ID:    Bactermia, unspeciated staph  Sepsis   - Outside hospital blood cx grew staph (unspeciated), resp cultures also had gram +ve cocci  - Vanc for sepsis. Not in shock. Follow infectious workup  - Elevated pro-calcitonin 2.11    Cardiac:  Sinus tachycardia  - monitor. Renal:  TRINIDAD  - Monitor UOP   - Trend Cr 3.1>2.5>2.0>1.8>1.4  - Inpatient consult to Nephrology    GI:  Elevated liver enzymes  - Trend daily as pt is on vec  - No stool. Code Status:Full Code  FEN: NPO  PPX: Lovenox 80 BID  DISPO: ICU    This patient will be staffed and discussed with Dr Agustín Armas MD  -----------------------------  Edwin Dos Santos MD, PGY-1  11/2/2021  8:01 AM    Patient was seen, examined and discussed with Dr. Gina Kidd. I agree with the interval history. My physical exam confirms the findings listed below  Chart was reviewed including labs and medical records confirm the findings noted     Acute respiratory failure on mechanical vent support. , RR 38, FiO2 90% PEEP 12  Peak pressure 38. ABG 7.16/68/75  Small apical pneumothorax, not seen on CXR this morning   COVID19 pneumonia   TRINIDAD - creatinine is trending up. UOP 1.1L over the past 24 hours. Leukocytosis at 20.1  ? staph bacteremia from the other hospital.    Hx of PE. Now on heparin drip      VT increased yesterday to 450 due to progressive respiratory acidosis.     Repeat blood culture  Daily CXR  Heparin drip  vanc      Pt has a high probability of imminent or life-threatening deterioration requiring close monitoring, and highly complex decision-making and/or interventions of high intensity to assess, manipulate, and support his critical organ systems to prevent a likely inevitable decline which could occur if left untreated.      A total critical care time 35 minutes was used. This includes but not limited to examining patient, collaborating with other physicians, monitoring vital signs, telemetry, continuous pulse oximetry, and clinical response to IV medications, documentation time, review and interpretation of laboratory and radiological data, review of nursing notes and old record review.  This time excludes any time that may have been spent performing procedures for life threatening organ failure

## 2021-11-02 NOTE — CARE COORDINATION
Case Management Assessment           Daily Note                 Date/ Time of Note: 11/2/2021 10:55 AM         Note completed by: Jessica Beckwith RN    Patient Name: Nicholas Santillan  YOB: 1970    Diagnosis:Pneumonia due to COVID-19 virus [U07.1, J12.82]  Patient Admission Status: Inpatient    Date of Admission:10/31/2021  5:37 AM Length of Stay: 2 GLOS: GMLOS: 4.8    Current Plan of Care: daily CXR, possible need for CRRT  ________________________________________________________________________________________  PT AM-PAC:   / 24 per last evaluation on: TBD    OT AM-PAC:   / 24 per last evaluation on: TBD    DME Needs for discharge: TBD  ________________________________________________________________________________________  Discharge Plan: To Be Determined DUE TO: from home with spouse/intubated and sedated    Tentative discharge date: TBD    Current barriers to discharge: not medically ready    Referrals completed: Not Applicable    Resources/ information provided: Not indicated at this time  ________________________________________________________________________________________  Case Management Notes:Patient is from home with spouse and independent prior. Trisha Penn and his family were provided with choice of provider; he and his family are in agreement with the discharge plan.     Care Transition Patient: No    Jessica [place], RN  [facility]  Case Management Department  Ph: [phone]  Fax: [phone]

## 2021-11-02 NOTE — CONSULTS
Nutrition Note    RD consulted for TF ordering and management. Recommendations as follows:    RECOMMENDATIONS:  1. PO Diet: Continue NPO while intubated  2. Nutrition Support:  a. Propofol meeting 70% of calorie needs (28.4mL/h providing ~749kcal)  a. Recommend EN formula Peptide-Based High Protein  Vital High Protein  @ goal rate 10 ml/hr to provide 240 ml total volume, 240 kcal, 21 g protein and 200ml free water. b. Initiate EN @10mL/hr and as tolerated   c. Recommend maintenance water flushes of 30mL q4 or defer to MD  chelly. Recommend protein modular TID via feeding tube. Flush with 30 ml water before/after administration. Do not mix with tube feeding formula.          Electronically signed by Jessica Acharya, MS, RD, LD on 11/2/21 at 12:07 PM EDT    Contact: [phone]

## 2021-11-02 NOTE — PROGRESS NOTES
Notification of IV to PO Conversion     IV To Po Conversion:   Will convert famotidine 20 mg IV daily to famotidine 20 mg via NGT daily based on [facility] IV to PO policy (see below). Please call with questions.   Shayy Rodriguez PharmD, [facility]  Wireless: [phone]  11/2/2021 10:35 AM      Criteria for conversion from IV to PO therapy per [facility] IV to PO Protocol:   Patients should meet all of the following inclusion criteria and none of the exclusion criteria    Criteria to initiate medication route switch (Inclusion Criteria)  IV therapy for > 24 hours (antibiotics only)  Tolerating diet more advanced than clear liquids  Tolerating oral (PO) medications  Does not require vasopressor therapy for blood pressure support  No seizures for 72hrs (antiepileptic medications only)      Criteria indicating that the patient is NOT a candidate for IV to PO conversion (Exclusion Criteria)  Infections requiring IV therapy (ie: meningitis, endocarditis, osteomyelitis, pancreatitis)  Nausea and/or vomiting or severe diarrhea within past 24 hours  Has gastrectomy, ileus, gastric outlet or bowel obstruction, or malabsorption syndromes  Has significant, painful oral ulceration  TPN with an NPO order  Active GI bleed  Unable to swallow  Nothing by Mouth (NPO) status  Febrile in the last 24 hours- (antibiotics only)  Clinical deteriorating or unstable - (antibiotics only)  Pediatric patients and patients who are not euthyroid (not on oral levothyroxine/not stabilized on oral levothyroxine) - (Levothyroxine only)  Patients being treated for myxedema coma or during the organ donation process - (Levothyroxine only)    Other notes-   Patients may be given suppositories when available for the product being ordered if no contraindications exist (ie: rectal surgery or infection)   Oral solutions/suspensions may be considered for patients with a functioning enteral tube not on continuous suction (if medication is available in this formulation)

## 2021-11-02 NOTE — PROGRESS NOTES
Nephrology Progress Note  PGY 3  Internal Medicine      Admit Date: 10/31/2021  Diet: Diet NPO  ADULT TUBE FEEDING; Nasogastric; Renal Formula; Continuous; 10; Yes; 10; Q 6 hours; 20; 30; After bolus    Chief Complaint: Shortness of breath    Interval history:   Patient has worsening of his creatinine to 3.1 and is hyperkalemic today to 5.3. Patient's urine output is good. Had 1.1 L out in the last day. Patient white count rising.        Medications:     Scheduled Meds:   vancomycin (VANCOCIN) intermittent dosing (placeholder)   Other RX Placeholder    sodium chloride flush  5-40 mL IntraVENous 2 times per day    famotidine (PEPCID) injection  20 mg IntraVENous BID    dexamethasone  20 mg IntraVENous Q24H    Followed by   Dean Adames ON 11/5/2021] dexamethasone  10 mg IntraVENous Q24H    insulin lispro  0-18 Units SubCUTAneous Q6H     Continuous Infusions:   heparin (PORCINE) Infusion 18 Units/kg/hr (11/02/21 0855)    dextrose      propofol 50 mcg/kg/min (11/02/21 0845)    fentaNYL 200 mcg/hr (11/02/21 0620)    sodium chloride      dextrose      vecuronium (NORCURON) infusion 0.5 mcg/kg/min (11/01/21 1623)     PRN Meds:heparin (porcine), heparin (porcine), glucose, dextrose, glucagon (rDNA), dextrose, sodium chloride flush, sodium chloride, ondansetron **OR** ondansetron, polyethylene glycol, acetaminophen **OR** acetaminophen, dextrose    Objective:   Vitals:   T-max:  Patient Vitals for the past 8 hrs:   BP Temp Temp src Pulse Resp SpO2   11/02/21 0900 123/69   100 (!) 38    11/02/21 0800 119/73 97.8 °F (36.6 °C) Axillary 98 (!) 38    11/02/21 0700 118/79   103 (!) 38 93 %   11/02/21 0600 128/75   105 (!) 38 91 %   11/02/21 0500 135/80   106 (!) 38 90 %   11/02/21 0400 130/80 97.6 °F (36.4 °C) Axillary 105 (!) 38 91 %   11/02/21 0354    105 (!) 38 92 %   11/02/21 0300 124/78   107 (!) 38 (!) 89 %   11/02/21 0200 127/79   108 (!) 38 90 %       Intake/Output Summary (Last 24 hours) at 11/2/2021 0905  Last data filed at 11/2/2021 0646  Gross per 24 hour   Intake 1538.83 ml   Output 1160 ml   Net 378.83 ml       Review of Systems   Unable to perform ROS: Intubated       Physical Exam  Vitals reviewed. Constitutional:       General: He is not in acute distress. Appearance: He is well-developed and well-groomed. He is ill-appearing and toxic-appearing. HENT:      Head: Normocephalic and atraumatic. Mouth/Throat:      Mouth: Mucous membranes are moist.      Pharynx: Oropharynx is clear. Eyes:      General: No scleral icterus. Extraocular Movements: Extraocular movements intact. Conjunctiva/sclera: Conjunctivae normal.      Pupils: Pupils are equal, round, and reactive to light. Cardiovascular:      Rate and Rhythm: Normal rate and regular rhythm. Pulses: Normal pulses. Heart sounds: Normal heart sounds, S1 normal and S2 normal. No murmur heard. Pulmonary:      Effort: Pulmonary effort is normal. No respiratory distress. Breath sounds: Normal breath sounds and air entry. Comments: intubated and mechanically ventillated  Abdominal:      General: Abdomen is flat. Bowel sounds are normal.      Palpations: Abdomen is soft. Tenderness: There is no abdominal tenderness. Musculoskeletal:         General: Normal range of motion. Cervical back: Full passive range of motion without pain, normal range of motion and neck supple. Skin:     General: Skin is warm and dry. Neurological:      General: No focal deficit present. Mental Status: He is alert and oriented to person, place, and time. Cranial Nerves: Cranial nerves are intact. Sensory: Sensation is intact. Motor: Motor function is intact. Coordination: Coordination is intact. Gait: Gait is intact. Deep Tendon Reflexes: Reflexes are normal and symmetric.    Psychiatric:         Attention and Perception: Attention and perception normal.         Mood and Affect: Mood normal.         Speech: Speech normal.         Behavior: Behavior normal. Behavior is cooperative. Thought Content: Thought content normal.         Cognition and Memory: Cognition and memory normal.         Judgment: Judgment normal.         LABS:    CBC:   Recent Labs     10/31/21  0555 10/31/21  0555 10/31/21  0800 11/01/21 0447 11/02/21 0337   WBC 29.6*  --   --  21.0* 21.4*   HGB 12.9*  --   --  11.6* 11.5*   HCT 40.1*  --   --  36.7* 35.0*   PLT 62*   < > 62* 70* 128*   MCV 91.4  --   --  92.1 90.7    < > = values in this interval not displayed. Renal:    Recent Labs     11/01/21 0447 11/01/21 0447 11/01/21 1606 11/01/21  1606 11/01/21 2000 11/02/21 0337 11/02/21  0553     --  140  --   --  142  --    K 4.7   < > 5.4*   < > 5.6* 5.2* 5.3*     --  107  --   --  108  --    CO2 21  --  20*  --   --  20*  --    BUN 53*  --  70*  --   --  90*  --    CREATININE 2.0*  --  2.5*  --   --  3.1*  --    GLUCOSE 209*  --  205*  --   --  212*  --    CALCIUM 8.1*  --  8.3  --   --  8.3  --    PHOS 4.9  --  5.7*  --   --  5.9*  --    ANIONGAP 12  --  13  --   --  14  --     < > = values in this interval not displayed. Hepatic:   Recent Labs     10/31/21  0555 10/31/21  1814 11/01/21 0447 11/01/21 1606 11/02/21 0337   AST 43*  --  31  --  27   ALT 52*  --  38  --  40   BILITOT 1.0  --  0.4  --  0.6   BILIDIR  --   --  0.3  --  0.5*   PROT 5.8*  --  5.4*  --  5.6*   LABALBU 2.6*   < > 2.4* 2.4* 2.5*   ALKPHOS 168*  --  124  --  147*    < > = values in this interval not displayed. Troponin: No results for input(s): TROPONINI in the last 72 hours. BNP: No results for input(s): BNP in the last 72 hours. Lipids: No results for input(s): CHOL, HDL in the last 72 hours.     Invalid input(s): LDLCALCU, TRIGLYCERIDE  ABGs:    Recent Labs     11/01/21  1137 11/01/21  1606 11/01/21  2350   PHART 7.104* 7.180* 7.135*   XRX0WID 79.8* 66.0* 72.8*   PO2ART 83.6 70.6* 66.0*   NRW6EXZ 25.0 25 24.5 BEART -5* -4.4* -5*   J3EHAVXL 91* 94 85*   SNA3TJM 27 27 27       INR: No results for input(s): INR in the last 72 hours. Lactate:   Recent Labs     10/31/21  0555 10/31/21  1012 10/31/21  1213   LACTATE 1.54 1.55 1.72     Cultures:  -----------------------------------------------------------------  RAD:   XR CHEST PORTABLE   Final Result   Impression: Stable appearance of the chest. No discrete pneumothorax. XR CHEST PORTABLE   Final Result   1. No interval change   2. No evidence of pneumothorax             XR CHEST PORTABLE   Final Result      1. ET tube about 3 cm above the dayna. 2.  Stable small left apical pneumothorax. 3.  Stable appearance of diffuse   4. Stable appearance of diffuse new mediastinal and thoracic inlet soft tissue gas. XR CHEST PORTABLE   Final Result      1. Small left apical pneumothorax. Pneumomediastinum. Soft tissue gas in the neck and chest wall. Findings suggestive of barotrauma. 2.  ET tube 5 cm above the dayna. 3.  Enteric tube tip in the distal stomach. 4.  Extensive bilateral airspace disease compatible with severe COVID-19 pneumonia and/or ARDS. Critical results reported to ICU nurse, Juli Ramos, 7:40 AM on 10/31/21. XR ABDOMEN (KUB) (SINGLE AP VIEW)   Final Result      1. Small left apical pneumothorax. Pneumomediastinum. Soft tissue gas in the neck and chest wall. Findings suggestive of barotrauma. 2.  ET tube 5 cm above the dayna. 3.  Enteric tube tip in the distal stomach. 4.  Extensive bilateral airspace disease compatible with severe COVID-19 pneumonia and/or ARDS. Critical results reported to ICU nurse, Juli Ramos, 7:40 AM on 10/31/21. XR CHEST PORTABLE    (Results Pending)       Assessment/Plan:     Acute kidney injury- worsening  Patient is critically ill with COVID-19, pulmonary embolism, and likely HCAP. Currently being treated in the ICU. Has a rising creatinine. Now 3.1.  Intubated, sedated and on paralytic (vecuronium)  · Maintain blood pressure to keep map over 65  · Avoid nephrotoxic agents  · Strict I's and O  · Daily renal panel  · If patient has acutely worsening renal function or decreased/cessation of urine output please contact nephrology  · If patient's electrolyte abnormalities worsen or his urine output decreases may need CRRT     Acute hypoxic respiratory failure secondary to severe ARDS. Patient has COVID-19 pneumonia, with the PE and likely HCAP. Intubated, sedated and paralyzed in the ICU.   · Continue management per ICU team     Sepsis secondary to COVID-19 pneumonia and bacteremia  Patient is COVID-19 pneumonia and a secondary bacterial infection  · On antibiotics and treatment per primary team        Code Status: Full  DISPO: ICU     This patient has been staffed and discussed with Be Brown      -----------------------------  Hakan King MD, PGY-3  11/2/2021  9:05 AM

## 2021-11-03 NOTE — CARE COORDINATION
Case Management Assessment           Daily Note                 Date/ Time of Note: 11/3/2021 2:31 PM         Note completed by: CONTRERAS Singh, BIBIW    Patient Name: Sin Carreno  YOB: 1970    Diagnosis:Pneumonia due to COVID-19 virus [U07.1, J12.82]  Patient Admission Status: Inpatient    Date of Admission:10/31/2021  5:37 AM Length of Stay: 3 GLOS: GMLOS: 4.8    Current Plan of Care:  Intubated/sedated & paralyzed, daily CXR, consult to nephrology (may need dialysis)   ________________________________________________________________________________________  PT AM-PAC:   / 24 per last evaluation on: tbd    OT AM-PAC:   / 24 per last evaluation on: tbd    DME Needs for discharge: tbd  ________________________________________________________________________________________  Discharge Plan: To Be Determined DUE TO: intubated/sedated    Tentative discharge date: tbd    Current barriers to discharge: not medically ready    Referrals completed: Not Applicable    Resources/ information provided: Not indicated at this time  ________________________________________________________________________________________  Case Management Notes:  Pt from home with spouse with no current services, as pt was independent prior to admission. CM will continue to follow for DC needs and recs. Brionna Staton and his family were provided with choice of provider; he and his family are in agreement with the discharge plan.     Care Transition Patient: CONTRERAS San, [facility]  Case Management Department  Ph: [phone]

## 2021-11-03 NOTE — PROGRESS NOTES
Patient has been admitted to the ICU, patient is Covid positive, intubated. To minimize the exposure, and preserve the PPE patient was not seen physically. We will defer the management to intensive care unit team.      Once the patient is Covid negative/out of the ICU will assume care. Please call with questions.       Юлия Turner MD

## 2021-11-03 NOTE — PROGRESS NOTES
Clinical Pharmacy Progress Note    Vancomycin - Management by Pharmacy    Consult Date(s): 10/31  Consulting Provider(s): Dr Dolores Diaz / Plan    1. HAP,  Bacteremia (per OSH culture) - Vancomycin   Concurrent Antimicrobials: None.  Day of Vanc Therapy: continued from OSH, unclear when vanc started   Current Dosing Method: Intermittent   Therapeutic Goal: 15-20 mcg/mL   Current Dose / Frequency: Intermittent dosing   Plan / Rationale:  o SCr increased to 3.9 mg/dL this AM; UOP 0.7 mL/kg/hr. Will continue to dose vancomycin intermittently via levels at this time. o Random vancomycin level this AM was 17.5 mcg/mL. Calculated half-life is 113 hrs.   o Will re-dose with vancomycin 1g (10 mg/kg) IV x 1 dose. Plan to obtain another level in 2-3 days pending renal function changes. Will obtain a level tomorrow AM if patient is started on CRRT today.  Will continue to monitor clinical condition and make adjustments to regimen as appropriate. Thank you for consulting Pharmacy! Darleen OmalleyD, [facility]  Wireless: [phone]  11/3/2021 7:26 AM      Interval Update: Patient remains intubated, sedated, and paralyzed. SCr continues to increase. Subjective/Objective: Mr. Nicholas Santilaln is a 45 y.o. male with no charted PMHx at this time. Patient started with COVID sx on 10/16, and diagnosed with +COVID test on 10/21. Admitted to OSH (in [place]) on 10/26 with SOB requiring supplemental O2; placed on dexamethasone, doxycycline and CTX per notes for PNA. Found to have acute PE. Respiratory function worsened,and pt was intubated 10/30 and transferred to [facility]. Blood cx + with Staph and respiratory cx with GPC. Pharmacy has been consulted to dose vancomycin per Dr Laltia Salvador.     Height:   Ht Readings from Last 1 Encounters:   11/01/21 5' 6\" (1.676 m)     Weight:   Wt Readings from Last 1 Encounters:   11/03/21 211 lb 10.3 oz (96 kg)       Level(s) / Doses:  Date Time Dose Level / Type of Level Interpretation   10/31 0555  Random = 23.6 mcg/ml · Drawn upon admission to [facility]. · Per ICU Rn, no MAR from OSH sent. Unclear prior vanc dosing. 10/31 1213  Random = 16.2 mcg/mL · Drawn ~6 hrs after last random level. · Calculated half-life ~12 hrs. 11/2 03:37 1g IV q12h -> intermittent dosing d/t TRINIDAD Random = 20.4 mcg/mL · Obtained ~25 hrs after the last dose  · Continue to hold dose   11/3 04:20 --- Random = 17.5 mcg/mL · Calculated half-life ~113 hrs  · Re-dose with 1g IV x 1   Note: Serum levels collected for AUC-based dosing may be high if collected in close proximity to the dose administered. This is not necessarily an indicator of toxicity. Cultures & Sensitivities:    Date Site Micro Susceptibility / Result   11/2 Blood x2 In process      Labs / Ancillary Data:    Estimated Creatinine Clearance: 24 mL/min (A) (based on SCr of 3.9 mg/dL (H)).     Recent Labs     11/02/21  0337 11/02/21  0900 11/02/21  1730 11/03/21  0420   CREATININE 3.1*  --  3.4* 3.9*   BUN 90*  --  110* 126*   WBC 21.4* 20.1*  --  20.1*       Additional Lab Values / Findings of Note:   Procalcitonin (10/31) = 1.71 (10/31) -> 2.11 (11/1)

## 2021-11-03 NOTE — PROGRESS NOTES
W Bypass Day: 4  ICU Day: 4                                                       Code:Full Code  Admit Date: 10/31/2021  PCP: No primary care provider on file. CC: COVID PNA    INTERVAL HISTORY:  No acute overnight events. Patient intubated and sedated. Vent day 4. Afebrile, Tachycardic in NSR, I/Os 1817/1610. ABG @ 1 AM 7.146/71.9/76.5. On fentanyl 150, 50 propofol, 0.7vecuronium. Patient's creatinine trending up since hospitalization. Inpatient consult to nephrology sent. HISTORY OF PRESENT ILLNESS:     39-year-old male presented to ER on [place], [place] for shortness of breath on 10/25. Patient experiencing shortness of breath on 10/16, was diagnosed with Covid on 10/21 and was put on antibiotics and dexamethasone with albuterol inhaler. However his symptoms worsened which prompted the ED visit in the ED CTA showed nonocclusive pulmonary embolism in the anterior portion of the left lower lobe with filling defects extending to the several proximal segment of the left lower lobe. During the admission at the outside hospital patient refused remdesivir, Actemra but was agreeable to steroids. Patient was initially on 15 L nonrebreather however oxygenation worsened as the days progressed and patient was unable to tolerate high flow O2 therapy and CPAP secondary to anxiety. Outside hospital attempted proning however did not help with oxygenation. Decision was made to intubate the patient and transfer to the [facility].    Patient was initially started on doxycycline and azithromycin for possible superimposed pneumonia. Infectious work-up from outside hospital shows nonspeciated staph in his blood and gram-positive cocci in this respiratory culture. PAST HISTORY:   No past medical history on file. No past surgical history on file.     SocialHistory:   The patient lives at home  Alcohol: unknown  Illicit drugs: no use  Tobacco: none     Family History:  No family history on file. MEDICATIONS:     No current facility-administered medications on file prior to encounter. No current outpatient medications on file prior to encounter. Scheduled Meds:   calcium gluconate IVPB  1,000 mg IntraVENous Once    famotidine  20 mg Per NG tube Daily    vancomycin (VANCOCIN) intermittent dosing (placeholder)   Other RX Placeholder    sodium chloride flush  5-40 mL IntraVENous 2 times per day    dexamethasone  20 mg IntraVENous Q24H    Followed by   Yrn Yun ON 11/5/2021] dexamethasone  10 mg IntraVENous Q24H    insulin lispro  0-18 Units SubCUTAneous Q6H      Continuous Infusions:   heparin (PORCINE) Infusion 12 Units/kg/hr (11/03/21 0226)    dextrose      propofol 50 mcg/kg/min (11/03/21 0545)    fentaNYL 200 mcg/hr (11/03/21 0616)    sodium chloride      dextrose      vecuronium (NORCURON) infusion 0.6 mcg/kg/min (11/03/21 0227)     PRN Meds:heparin (porcine), heparin (porcine), glucose, dextrose, glucagon (rDNA), dextrose, sodium chloride flush, sodium chloride, ondansetron **OR** ondansetron, polyethylene glycol, acetaminophen **OR** acetaminophen, dextrose    Allergies: Allergies   Allergen Reactions    Shellfish-Derived Products Nausea And Vomiting       PHYSICAL EXAM:   Vitals: BP (!) 136/104   Pulse 124   Temp 98.9 °F (37.2 °C) (Oral)   Resp (!) 38   Ht 5' 6\" (1.676 m)   Wt 211 lb 10.3 oz (96 kg)   SpO2 93%   BMI 34.16 kg/m²     I/O:      Intake/Output Summary (Last 24 hours) at 11/3/2021 0653  Last data filed at 11/3/2021 0549  Gross per 24 hour   Intake 1817.77 ml   Output 1610 ml   Net 207.77 ml     I/O this shift: In: 865.8 [I.V.:775.8; NG/GT:90]  Out: 750 [Urine:750]  I/O last 3 completed shifts: In: 6591 [I.V.:1365; NG/GT:140; IV Piggyback:100]  Out: 8588 [Urine:1315]    Physical Exam  Constitutional:       General: He is not in acute distress. Appearance: Normal appearance. He is normal weight.  He is hours.    Invalid input(s): [facility]    XR CHEST PORTABLE   Final Result   Impression: Stable appearance of the chest. No discrete pneumothorax. XR CHEST PORTABLE   Final Result   1. No interval change   2. No evidence of pneumothorax             XR CHEST PORTABLE   Final Result      1. ET tube about 3 cm above the dayna. 2.  Stable small left apical pneumothorax. 3.  Stable appearance of diffuse   4. Stable appearance of diffuse new mediastinal and thoracic inlet soft tissue gas. XR CHEST PORTABLE   Final Result      1. Small left apical pneumothorax. Pneumomediastinum. Soft tissue gas in the neck and chest wall. Findings suggestive of barotrauma. 2.  ET tube 5 cm above the dayna. 3.  Enteric tube tip in the distal stomach. 4.  Extensive bilateral airspace disease compatible with severe COVID-19 pneumonia and/or ARDS. Critical results reported to ICU nurseDwayne, 7:40 AM on 10/31/21. XR ABDOMEN (KUB) (SINGLE AP VIEW)   Final Result      1. Small left apical pneumothorax. Pneumomediastinum. Soft tissue gas in the neck and chest wall. Findings suggestive of barotrauma. 2.  ET tube 5 cm above the dayna. 3.  Enteric tube tip in the distal stomach. 4.  Extensive bilateral airspace disease compatible with severe COVID-19 pneumonia and/or ARDS. Critical results reported to ICU nurseDwayne, 7:40 AM on 10/31/21.             XR CHEST PORTABLE    (Results Pending)   XR CHEST PORTABLE    (Results Pending)     ASSESSMENT AND PLAN:     Respiratory:   Acute hypoxic respiratory failure requiring intubation   Severe ARDS  Sedated and paralysed 2/2 increased work of breathing  COVID-19 PNA, day 17 since symptom onset  - Patient diagnosed on 10/21/2021  Vent Mode: AC/PRVC Rate Set: 38 bmp/Vt Ordered: 450 mL/ /FiO2 : 90 %  - Sedated and paralysed with propofol, fentanyl and vecuronium.   - Decadron 20 mg ( day 4)  - continue full dose lovenox  - ABG 7.143/71.9.76.5  - Not much space left for changing the vent settings. Small left apical PTX  Pneumomediastinum   - No intervention needed at this moment  - Daily CXR - stable today     Pulmonary embolism  - on therapeutic lovenox    ID:    Bactermia, unspeciated staph  Sepsis   - Outside hospital blood cx grew staph (unspeciated), resp cultures also had gram +ve cocci  - Vanc for sepsis. Not in shock. Follow infectious workup  - Elevated pro-calcitonin 2.11  - F/U Blood cultures. Cardiac:  Sinus tachycardia  - monitor. Renal:  TRINIDAD  - Monitor UOP   - Trend Cr 2.0>1.8>1.4  - Inpatient consult to Nephrology    GI:  Elevated liver enzymes  - Trend daily as pt is on vec  - No stool. Code Status:Full Code  FEN: NPO  PPX: Lovenox 80 BID  DISPO: ICU    This patient will be staffed and discussed with Dr Jennifer Ca MD  -----------------------------  MD Carlotta, PGY-1  11/3/2021  6:53 AM    Patient was seen, examined and discussed with Tr Carter. I agree with the interval history. My physical exam confirms the findings listed below  Chart was reviewed including labs and medical records confirm the findings noted     Acute respiratory failure on mechanical vent support. , RR 38, FiO2 90% PEEP 12  ABG 7.18/56/70  Small apical pneumothorax, not seen on CXR this morning   COVID19 pneumonia   TRINIDAD - creatinine is trending up.  UOP 1.6L over the past 24 hours  Hyperkalemia - started on Lokelma   Leukocytosis at 20  ?staph bacteremia from the other hospital.    Hx of PE. Now on heparin drip       Increase RR to 40, decrease iTime to 0.5 and decrease PEEP to 10. Heparin drip  Continue vanc   Increase TF to goal     Pt has a high probability of imminent or life-threatening deterioration requiring close monitoring, and highly complex decision-making and/or interventions of high intensity to assess, manipulate, and support his critical organ systems to prevent a likely inevitable decline which could occur if left untreated.    A total critical care time 35 minutes was used. This includes but not limited to examining patient, collaborating with other physicians, monitoring vital signs, telemetry, continuous pulse oximetry, and clinical response to IV medications, documentation time, review and interpretation of laboratory and radiological data, review of nursing notes and old record review.  This time excludes any time that may have been spent performing procedures for life threatening organ failure

## 2021-11-03 NOTE — PROGRESS NOTES
Comprehensive Nutrition Assessment    RECOMMENDATIONS:  1. PO Diet: Continue NPO  2. EN- adjust to EN Nepro @ trophic rate 10 ml/hr to prevent overfeeding with propofol. 3. Water flushes of 30 ml every 4 hours. NUTRITION ASSESSMENT:   Type and Reason for Visit:  Type and Reason for Visit: Reassess   Nutritional summary & status: EN restarted 11/2- VHP @ 10 ml/hr; noted concern for TRINIDAD w/elevated K+ and Phos today; per RN, no diaylsis yet- MD added lokelma to see if aids w/elevated K+. Noted propofol providing > 50% of pt nutrition needs at this time,thus RD will not advance EN rate at this time. RD discussed EN rate and renal labs. Nephrology discussed EN w/RD- wants Renal formula. Adjusting EN orders per request. Current EN rate providing negligible K+/Phos in comparison to renal formula. (See EN nutritionals info below)      Patient admitted d/t SOB - COVID-19 positive     PMH significant for: No PMHx listed in EMR    MALNUTRITION ASSESSMENT  Context of Malnutrition: Acute Illness   Malnutrition Status: Insufficient data      NUTRITION DIAGNOSIS   · Inadequate oral intake related to impaired respiratory function as evidenced by NPO or clear liquid status due to medical condition, intubation      [address] and/or Nutrient Delivery:  Continue NPO  Nutrition Education/Counseling:  No recommendation at this time   Goals:  Pt to tolerate most appropriate form of nutrition to meet 100% of nutritional needs.        Nutrition Monitoring and Evaluation:   Food/Nutrient Intake Outcomes:  Food and Nutrient Intake  Physical Signs/Symptoms Outcomes:  Biochemical Data, Hemodynamic Status     OBJECTIVE DATA: Significant to nutrition assessment  · Nutrition-Focused Physical Findings: Nutrition Related Findings: NGT in R nare; lbm 11/2  · Labs: Reviewed; K+ 6.5, Phos 7.4  ,   · Meds: Reviewed; decadron, lokelma, fentanyl/propofol  · Wounds: Wound Type: None     CURRENT NUTRITION THERAPIES  Diet NPO  ADULT TUBE FEEDING; Nasogastric; Renal Formula; Continuous; 10; No; 30; Q 4 hours  Current Tube Feeding (TF) Orders:  · Feeding Route: Nasogastric  · Formula: Peptide Based High Protein  · Schedule: Continuous  · Additives/Modulars:  (n/a )  · Water Flushes: 30 ml every 4 hours   · Current TF & Flush Orders Provides: VHP @ 10 ml/hr provides 240 ml, 240 kcal, 21 g protein 21 g protein, 201 ml free water. (Provides 384 g K+; 192 g Phos)   · Goal TF & Flush Orders Provides: Change to Nepro @ 10 ml/hr to provide 240 ml, 432 kcal, 19g protein and 262 ml free water. (Provides 254 mg K+ and 173 mg Phos)   Additional Calorie Sources:   propofol @ 25.6 ml/hr = 676 kcal  PO Intake: Average Meal Intake: NPO       ANTHROPOMETRICS  Current Height: 5' 6\" (167.6 cm)  Current Weight: 211 lb 10.3 oz (96 kg)    Admission weight: 208 lb 12.4 oz (94.7 kg)  Ideal Body Weight (lbs) (Calculated): 142 lbs (Ideal Body Weight (Kg) (Calculated): 65 kg)      BMI BMI (Calculated): 34.2    Wt Readings from Last 50 Encounters:   11/03/21 211 lb 10.3 oz (96 kg)       COMPARATIVE STANDARDS  Energy (kcal):  9672-0022 (11-14); Weight Used for Energy Requirements:  Admission (94.7 kg )  Protein (g):  117-130 (1.8-2.0); Weight Used for Protein Requirements:  Ideal (65 kg )        Fluid (ml/day):  1500 ml minimum; Method Used for Fluid Requirements:  Other (Comment)      The patient will still be monitored per nutrition standards of care. Consult dietitian if nutrition interventions essential to patient care is needed.      Danielle James, [address]  [place]:  [phone]  Office:  [phone]

## 2021-11-03 NOTE — PROGRESS NOTES
Nephrology Progress Note  PGY 3  Internal Medicine      Admit Date: 10/31/2021  Diet: Diet NPO  ADULT TUBE FEEDING; Nasogastric; Peptide Based High Protein; Continuous; 10; No; 30; Q 4 hours    Chief Complaint: Shortness of breath    Interval history:   Patient is critically ill. Creatinine is rising to 3.9. Potassium is now at 6.5. Patient does continue to make good urine. Will start lokelma today and continue to monitor labs. Maintaining good blood pressure. Not on pressers. Patient is critically ill.       Medications:     Scheduled Meds:   vancomycin  1,000 mg IntraVENous Once    famotidine  20 mg Per NG tube Daily    vancomycin (VANCOCIN) intermittent dosing (placeholder)   Other RX Placeholder    sodium chloride flush  5-40 mL IntraVENous 2 times per day    dexamethasone  20 mg IntraVENous Q24H    Followed by   Gris Delgado ON 11/5/2021] dexamethasone  10 mg IntraVENous Q24H    insulin lispro  0-18 Units SubCUTAneous Q6H     Continuous Infusions:   heparin (PORCINE) Infusion 12 Units/kg/hr (11/03/21 0600)    dextrose      propofol 50 mcg/kg/min (11/03/21 0600)    fentaNYL 200 mcg/hr (11/03/21 0616)    sodium chloride      dextrose      vecuronium (NORCURON) infusion 0.6 mcg/kg/min (11/03/21 0600)     PRN Meds:heparin (porcine), heparin (porcine), glucose, dextrose, glucagon (rDNA), dextrose, sodium chloride flush, sodium chloride, ondansetron **OR** ondansetron, polyethylene glycol, acetaminophen **OR** acetaminophen, dextrose    Objective:   Vitals:   T-max:  Patient Vitals for the past 8 hrs:   BP Temp Temp src Pulse Resp SpO2 Weight   11/03/21 0800 108/72 98.3 °F (36.8 °C) Oral 105 (!) 38 94 %    11/03/21 0744    107  94 %    11/03/21 0549       211 lb 10.3 oz (96 kg)   11/03/21 0400  98.9 °F (37.2 °C) Oral       11/03/21 0338    124 (!) 38 93 %        Intake/Output Summary (Last 24 hours) at 11/3/2021 0848  Last data filed at 11/3/2021 0600  Gross per 24 hour   Intake 1829.32 ml   Output 1510 ml   Net 319.32 ml       Review of Systems   Unable to perform ROS: Intubated       Physical Exam  Vitals reviewed. Constitutional:       General: He is not in acute distress. Appearance: He is well-developed and well-groomed. He is ill-appearing and toxic-appearing. HENT:      Head: Normocephalic and atraumatic. Mouth/Throat:      Mouth: Mucous membranes are moist.      Pharynx: Oropharynx is clear. Eyes:      General: No scleral icterus. Extraocular Movements: Extraocular movements intact. Conjunctiva/sclera: Conjunctivae normal.      Pupils: Pupils are equal, round, and reactive to light. Cardiovascular:      Rate and Rhythm: Normal rate and regular rhythm. Pulses: Normal pulses. Heart sounds: Normal heart sounds, S1 normal and S2 normal. No murmur heard. Pulmonary:      Effort: Pulmonary effort is normal. No respiratory distress. Breath sounds: Normal breath sounds and air entry. Comments: intubated and mechanically ventillated  Abdominal:      General: Abdomen is flat. Bowel sounds are normal.      Palpations: Abdomen is soft. Tenderness: There is no abdominal tenderness. Musculoskeletal:         General: Normal range of motion. Cervical back: Full passive range of motion without pain, normal range of motion and neck supple. Skin:     General: Skin is warm and dry. Neurological:      General: No focal deficit present. Mental Status: He is alert and oriented to person, place, and time. Cranial Nerves: Cranial nerves are intact. Sensory: Sensation is intact. Motor: Motor function is intact. Coordination: Coordination is intact. Gait: Gait is intact. Deep Tendon Reflexes: Reflexes are normal and symmetric.    Psychiatric:         Attention and Perception: Attention and perception normal.         Mood and Affect: Mood normal.         Speech: Speech normal.         Behavior: Behavior normal. Behavior is cooperative. Thought Content: Thought content normal.         Cognition and Memory: Cognition and memory normal.         Judgment: Judgment normal.         LABS:    CBC:   Recent Labs     11/02/21  0337 11/02/21  0900 11/03/21  0420   WBC 21.4* 20.1* 20.1*   HGB 11.5* 11.2* 11.2*   HCT 35.0* 34.2* 35.3*   * 125* 158   MCV 90.7 90.8 93.2     Renal:    Recent Labs     11/02/21  0337 11/02/21  0553 11/02/21  1730 11/02/21  2020 11/03/21 0420     --  139  --  144   K 5.2*   < > 6.2* 5.9* 6.5*     --  106  --  109   CO2 20*  --  22  --  22   BUN 90*  --  110*  --  126*   CREATININE 3.1*  --  3.4*  --  3.9*   GLUCOSE 212*  --  197*  --  196*   CALCIUM 8.3  --  8.4  --  8.1*   PHOS 5.9*  --  7.3*  --  7.4*   ANIONGAP 14  --  11  --  13    < > = values in this interval not displayed. Hepatic:   Recent Labs     11/01/21  0447 11/01/21  1606 11/02/21 0337 11/02/21 1730 11/03/21 0420   AST 31  --  27  --  49*   ALT 38  --  40  --  74*   BILITOT 0.4  --  0.6  --  0.9   BILIDIR 0.3  --  0.5*  --  0.8*   PROT 5.4*  --  5.6*  --  5.6*   LABALBU 2.4*   < > 2.5* 2.7* 2.5*   ALKPHOS 124  --  147*  --  194*    < > = values in this interval not displayed. Troponin: No results for input(s): TROPONINI in the last 72 hours. BNP: No results for input(s): BNP in the last 72 hours. Lipids: No results for input(s): CHOL, HDL in the last 72 hours.     Invalid input(s): LDLCALCU, TRIGLYCERIDE  ABGs:    Recent Labs     11/02/21  0859 11/02/21  1730 11/03/21  0120   PHART 7.164* 7.101* 7.143*   PPU4UXD 68.7* 80.9* 71.9*   PO2ART 75.3 79.3 76.5   [id number] 25 25 25   BEART -4.8* -5.9* -5.4*   [id number] 95 94 95   [id number] 27 28 27       INR:   Recent Labs     11/02/21  0900   INR 1.02     Lactate:   Recent Labs     10/31/21  1012 10/31/21  1213   LACTATE 1.55 1.72     Cultures:  -----------------------------------------------------------------  RAD:   XR CHEST PORTABLE   Final Result      Lines and tubes unchanged. Diffuse airspace disease similar to prior study. XR CHEST PORTABLE   Final Result   Impression: Stable appearance of the chest. No discrete pneumothorax. XR CHEST PORTABLE   Final Result   1. No interval change   2. No evidence of pneumothorax             XR CHEST PORTABLE   Final Result      1. ET tube about 3 cm above the dayna. 2.  Stable small left apical pneumothorax. 3.  Stable appearance of diffuse   4. Stable appearance of diffuse new mediastinal and thoracic inlet soft tissue gas. XR CHEST PORTABLE   Final Result      1. Small left apical pneumothorax. Pneumomediastinum. Soft tissue gas in the neck and chest wall. Findings suggestive of barotrauma. 2.  ET tube 5 cm above the dayna. 3.  Enteric tube tip in the distal stomach. 4.  Extensive bilateral airspace disease compatible with severe COVID-19 pneumonia and/or ARDS. Critical results reported to ICU nurse, [facility], 7:40 AM on 10/31/21. XR ABDOMEN (KUB) (SINGLE AP VIEW)   Final Result      1. Small left apical pneumothorax. Pneumomediastinum. Soft tissue gas in the neck and chest wall. Findings suggestive of barotrauma. 2.  ET tube 5 cm above the dayna. 3.  Enteric tube tip in the distal stomach. 4.  Extensive bilateral airspace disease compatible with severe COVID-19 pneumonia and/or ARDS. Critical results reported to ICU nurse, [facility], 7:40 AM on 10/31/21. XR CHEST PORTABLE    (Results Pending)       Assessment/Plan:     Acute kidney injury- worsening  Patient is critically ill with COVID-19, pulmonary embolism, and likely HCAP. Currently being treated in the ICU. Has a rising creatinine. Now 3.1.  Intubated, sedated and on paralytic (vecuronium)  · Maintain blood pressure to keep map over 65  · Avoid nephrotoxic agents  · Strict I's and O  · Daily renal panel  · If patient has acutely worsening renal function or decreased/cessation of urine output please contact nephrology  · If patient's electrolyte abnormalities worsen or his urine output decreases may need CRRT     Hyperkalemia  Patient has a potassium of 6.5 this morning. Received IV insulin, etc  · Start Amos Robinson today  · Labs later today    Acute hypoxic respiratory failure secondary to severe ARDS. Patient has COVID-19 pneumonia, with the PE and likely HCAP. Intubated, sedated and paralyzed in the ICU.   · Continue management per ICU team     Sepsis secondary to COVID-19 pneumonia and bacteremia  Patient is COVID-19 pneumonia and a secondary bacterial infection  · On antibiotics and treatment per primary team        Code Status: Full  DISPO: ICU     This patient has been staffed and discussed with Jennfier Shannon      -----------------------------  Derek Krause MD, PGY-3  11/3/2021  8:48 AM

## 2021-11-03 NOTE — PROGRESS NOTES
Patient remains on paralytic, titrating sedation for rass goal of -5 per orders. Vitals are stable/consistent. Tenorio in place, urine output is WNL. Patient given Nada Edu for high K per orders, will draw lab at 1400 per Dr. Nikhil Frias. Switched tube feeds to nephro per orders. Will continue to monitor closely.

## 2021-11-03 NOTE — PLAN OF CARE
Problem: Falls - Risk of:  Goal: Will remain free from falls  Description: Will remain free from falls  Patient is free from falls. Fall precautions are in place: bed is locked and in lowest position, side rails are up x 3, bed alarm is on. Will continue to monitor.   Outcome: Ongoing     Problem: Nutrition  Goal: Optimal nutrition therapy  Outcome: Ongoing

## 2021-11-04 NOTE — PROGRESS NOTES
Patient has been admitted to the ICU, patient is Covid positive, intubated, in isolation. To minimize the exposure, and preserve the PPE patient was not seen physically. We will defer the management to intensive care unit team.      Once the patient is Covid negative/out of the ICU will assume care. Please call with questions.       Parish Chaparro MD

## 2021-11-04 NOTE — CARE COORDINATION
Case Management Assessment           Daily Note                 Date/ Time of Note: 11/4/2021 3:14 PM         Note completed by: CONTRERAS Vazquez, BIBIW    Patient Name: Antoinette Madrigal  YOB: 1970    Diagnosis:Pneumonia due to COVID-19 virus [U07.1, J12.82]  Patient Admission Status: Inpatient    Date of Admission:10/31/2021  5:37 AM Length of Stay: 4 GLOS: GMLOS: 4.8    Current Plan of Care: vent settings changed, CREAT remains elevated, possible CRRT  ________________________________________________________________________________________  PT AM-PAC:   / 24 per last evaluation on: tbd    OT AM-PAC:   / 24 per last evaluation on: tbd    DME Needs for discharge: tbd  ________________________________________________________________________________________  Discharge Plan: To Be Determined DUE TO: intubated/sedated    Tentative discharge date: tbd    Current barriers to discharge: not medically ready    Referrals completed: Not Applicable    Resources/ information provided: Not indicated at this time  ________________________________________________________________________________________  Case Management Notes:  Pt from home with spouse, pt was independent prior to admission. CM will continue to follow for DC needs and recs. Alexsander Roque and his family were provided with choice of provider; he and his family are in agreement with the discharge plan.     Care Transition Patient: CONTRERAS Sherman, [facility]  Case Management Department  Ph: [phone]

## 2021-11-04 NOTE — PROGRESS NOTES
W Bypass Day: 5  ICU Day: 5                                                      Code:Full Code  Admit Date: 10/31/2021  PCP: No primary care provider on file. CC: COVID PNA    INTERVAL HISTORY:  No acute overnight events. Patient intubated and sedated. Vent day 5. Afebrile, Tachycardic in NSR, I/Os 2889/1825. ABG @ 1 AM 7.170/69.5/85.7. On fentanyl 200, 50 propofol, 0.4 vecuronium. Patient's creatinine trending up since hospitalization. Inpatient consult to nephrology sent. HISTORY OF PRESENT ILLNESS:     65-year-old male presented to ER on [place], [place] for shortness of breath on 10/25. Patient experiencing shortness of breath on 10/16, was diagnosed with Covid on 10/21 and was put on antibiotics and dexamethasone with albuterol inhaler. However his symptoms worsened which prompted the ED visit in the ED CTA showed nonocclusive pulmonary embolism in the anterior portion of the left lower lobe with filling defects extending to the several proximal segment of the left lower lobe. During the admission at the outside hospital patient refused remdesivir, Actemra but was agreeable to steroids. Patient was initially on 15 L nonrebreather however oxygenation worsened as the days progressed and patient was unable to tolerate high flow O2 therapy and CPAP secondary to anxiety. Outside hospital attempted proning however did not help with oxygenation. Decision was made to intubate the patient and transfer to the [facility].    Patient was initially started on doxycycline and azithromycin for possible superimposed pneumonia. Infectious work-up from outside hospital shows nonspeciated staph in his blood and gram-positive cocci in this respiratory culture. PAST HISTORY:   No past medical history on file. No past surgical history on file.     SocialHistory:   The patient lives at home  Alcohol: unknown  Illicit drugs: no use  Tobacco: in acute distress. Appearance: Normal appearance. He is normal weight. He is not ill-appearing, toxic-appearing or diaphoretic. Comments: Intubated, sedated   Eyes:      Pupils: Pupils are equal, round, and reactive to light. Cardiovascular:      Rate and Rhythm: Regular rhythm. Tachycardia present. Pulses: Normal pulses. Heart sounds: Murmur heard. Pulmonary:      Effort: Pulmonary effort is normal. No respiratory distress. Breath sounds: Wheezing and rhonchi present. Abdominal:      General: Bowel sounds are normal. There is no distension. Palpations: Abdomen is soft. Musculoskeletal:         General: No swelling. Recent Labs     11/03/21  1632 11/04/21  0040   PHART 7.167* 7.170*   [id number] 68.2* 69.5*   PO2ART 68.4* 85.7     DATA:   Labs:  CBC:   Recent Labs     11/02/21  0900 11/03/21  0420 11/04/21  0430   WBC 20.1* 20.1* 18.7*   HGB 11.2* 11.2* 11.2*   HCT 34.2* 35.3* 34.0*   * 158 165       BMP:   Recent Labs     11/03/21  0420 11/03/21  0420 11/03/21  1420 11/03/21  1625 11/04/21  0430     --   --  142 141   K 6.5*   < > 5.8* 5.9* 5.8*     --   --  109 105   CO2 22  --   --  22 23   *  --   --  135* 145*   CREATININE 3.9*  --   --  4.1* 4.0*   GLUCOSE 196*  --   --  174* 225*   PHOS 7.4*  --   --  7.0* 8.2*    < > = values in this interval not displayed. LFT's:   Recent Labs     11/02/21  0337 11/03/21  0420 11/04/21  0430   AST 27 49* 27   ALT 40 74* 69*   BILITOT 0.6 0.9 0.8   ALKPHOS 147* 194* 142*     Troponin: No results for input(s): TROPONINI in the last 72 hours. BNP:No results for input(s): BNP in the last 72 hours.   ABGs:   Recent Labs     11/03/21  1632 11/04/21  0040   PHART 7.167* 7.170*   [id number] 68.2* 69.5*   PO2ART 68.4* 85.7     INR:   Recent Labs     11/02/21  0900   INR 1.02       U/A:No results for input(s): NITRITE, COLORU, PHUR, LABCAST, WBCUA, RBCUA, MUCUS, TRICHOMONAS, YEAST, BACTERIA, CLARITYU, SPECGRAV, LEUKOCYTESUR, UROBILINOGEN, BILIRUBINUR, BLOODU, GLUCOSEU, AMORPHOUS in the last 72 hours. Invalid input(s): KETONESU    XR CHEST PORTABLE   Final Result      Lines and tubes unchanged. Diffuse airspace disease similar to prior study. XR CHEST PORTABLE   Final Result   Impression: Stable appearance of the chest. No discrete pneumothorax. XR CHEST PORTABLE   Final Result   1. No interval change   2. No evidence of pneumothorax             XR CHEST PORTABLE   Final Result      1. ET tube about 3 cm above the dayna. 2.  Stable small left apical pneumothorax. 3.  Stable appearance of diffuse   4. Stable appearance of diffuse new mediastinal and thoracic inlet soft tissue gas. XR CHEST PORTABLE   Final Result      1. Small left apical pneumothorax. Pneumomediastinum. Soft tissue gas in the neck and chest wall. Findings suggestive of barotrauma. 2.  ET tube 5 cm above the dayna. 3.  Enteric tube tip in the distal stomach. 4.  Extensive bilateral airspace disease compatible with severe COVID-19 pneumonia and/or ARDS. Critical results reported to ICU nurseAlvin, 7:40 AM on 10/31/21. XR ABDOMEN (KUB) (SINGLE AP VIEW)   Final Result      1. Small left apical pneumothorax. Pneumomediastinum. Soft tissue gas in the neck and chest wall. Findings suggestive of barotrauma. 2.  ET tube 5 cm above the dayna. 3.  Enteric tube tip in the distal stomach. 4.  Extensive bilateral airspace disease compatible with severe COVID-19 pneumonia and/or ARDS. Critical results reported to ICU nurseAlvin, 7:40 AM on 10/31/21.             XR CHEST PORTABLE    (Results Pending)   XR CHEST PORTABLE    (Results Pending)     ASSESSMENT AND PLAN:     Respiratory:   Acute hypoxic respiratory failure requiring intubation   Severe ARDS  Sedated and paralysed 2/2 increased work of breathing  COVID-19 PNA, day 17 since symptom onset  - Patient diagnosed on 10/21/2021  Vent Mode: AC/PRVC Rate Set: 40 bmp/Vt Ordered: 450 mL/ /FiO2 : 90 %  - Sedated and paralysed with propofol, fentanyl and vecuronium.   - Decadron 20 mg ( day 5)  - continue full dose lovenox  - ABG 7.170/69.5/85. 7- Not much space left for changing the vent settings. Small left apical PTX  Pneumomediastinum   - No intervention needed at this moment  - Daily CXR -showed no pneumothorax but did show subtle pneumomediastinum. Pulmonary embolism  - on therapeutic lovenox    ID:    Bactermia, unspeciated staph  Sepsis   - Outside hospital blood cx grew staph (unspeciated), resp cultures also had gram +ve cocci  - Vanc for sepsis. Not in shock. Follow infectious workup  - Elevated pro-calcitonin 2.11  - F/U Blood cultures showed no growth. Cardiac:  Sinus tachycardia  - monitor. Renal:  TRINIDAD  - Monitor UOP   - Trend Cr 4.0>2.0>1.8>1.4  - Inpatient consult to Nephrology    Progressive Hyperkalemia:  - Elevated K, Nephrology following. The patient may need CRRT. GI:  Elevated liver enzymes  - Trend daily as pt is on vec  - No stool. Hyperglycemia:  - Patient on 10 lantus and HDSSI,   - Plan to increase the dose of lantus. Code Status:Full Code  FEN: NPO  PPX: Lovenox 80 BID  DISPO: ICU    This patient will be staffed and discussed with Dr Usha Hogan MD  -----------------------------  Arnaldo Hayes MD, PGY-1  11/4/2021  6:58 AM    Patient was seen, examined and discussed with Cristina Lynn. I agree with the interval history. My physical exam confirms the findings listed below  Chart was reviewed including labs and medical records confirm the findings noted     Acute respiratory failure on mechanical vent support. VT 450, RR 40, FiO2 90% PEEP 12  ABG 7.17/69/85  CXR with mild worsening   COVID19 pneumonia   TRINIDAD - creatinine is trending up at 4.0 with BUN of 145. He is making urine with UOP 1.9L over the past 24 hours. Nephrology following   Hyperkalemia - at 5.8.   He is on [employer] [employer] 18  ?staph bacteremia from the other hospital.    Hx of PE.  on heparin drip       Increase RR to 44, decrease PEEP to 10, decrease iTime to 0.5  Heparin drip  Continue vanc   TF  Continue bicarb drip at 50  Recheck ABG  Change vec to nimbex due to progressive renal failure      Pt has a high probability of imminent or life-threatening deterioration requiring close monitoring, and highly complex decision-making and/or interventions of high intensity to assess, manipulate, and support his critical organ systems to prevent a likely inevitable decline which could occur if left untreated.      A total critical care time 35 minutes was used. This includes but not limited to examining patient, collaborating with other physicians, monitoring vital signs, telemetry, continuous pulse oximetry, and clinical response to IV medications, documentation time, review and interpretation of laboratory and radiological data, review of nursing notes and old record review.  This time excludes any time that may have been spent performing procedures for life threatening organ failure

## 2021-11-04 NOTE — PROGRESS NOTES
Clinical Pharmacy Progress Note    Vancomycin - Management by Pharmacy    Consult Date(s): 10/31  Consulting Provider(s): Dr Barragan Hams / Plan    1. HAP,  Bacteremia (per OSH culture) - Vancomycin   Concurrent Antimicrobials: None.  Day of Vanc Therapy: continued from OSH, unclear when vanc started   Current Dosing Method: Intermittent   Therapeutic Goal: 15-20 mcg/mL   Current Dose / Frequency: Intermittent dosing   Plan / Rationale:  o SCr increased to 4.0 mg/dL this AM; UOP 1.2 mL/kg/hr. Will continue to dose vancomycin intermittently via levels at this time. o Vancomycin 1g IV x 1 dose given yesterday. Will continue to hold dose today and obtain a random level tomorrow AM.         [facility] Will continue to monitor clinical condition and make adjustments to regimen as appropriate. Thank you for consulting Pharmacy! Darleen TafoyaD, [facility]  Wireless: [phone]  11/4/2021 10:26 AM      Interval Update: Patient remains intubated, sedated, and paralyzed. SCr increased slightly, but UOP improved. Subjective/Objective: Mr. Paola Gutierrez is a 45 y.o. male with no charted PMHx at this time. Patient started with COVID sx on 10/16, and diagnosed with +COVID test on 10/21. Admitted to OSH (in [place]) on 10/26 with SOB requiring supplemental O2; placed on dexamethasone, doxycycline and CTX per notes for PNA. Found to have acute PE. Respiratory function worsened,and pt was intubated 10/30 and transferred to [facility]. Blood cx + with Staph and respiratory cx with GPC. Pharmacy has been consulted to dose vancomycin per Dr Janneth Feldman. Height:   Ht Readings from Last 1 Encounters:   11/04/21 5' 6\" (1.676 m)     Weight:   Wt Readings from Last 1 Encounters:   11/04/21 212 lb 4.9 oz (96.3 kg)       Level(s) / Doses:  Date Time Dose Level / Type of Level Interpretation   10/31 0555  Random = 23.6 mcg/ml · Drawn upon admission to [facility]. · Per ICU Rn, no MAR from OSH sent.   Unclear prior vanc dosing. 10/31 1213  Random = 16.2 mcg/mL · Drawn ~6 hrs after last random level. · Calculated half-life ~12 hrs. 11/2 03:37 1g IV q12h -> intermittent dosing d/t TRINIDAD Random = 20.4 mcg/mL · Obtained ~25 hrs after the last dose  · Continue to hold dose   11/3 04:20 --- Random = 17.5 mcg/mL · Calculated half-life ~113 hrs  · Re-dose with 1g IV x 1       ·    Note: Serum levels collected for AUC-based dosing may be high if collected in close proximity to the dose administered. This is not necessarily an indicator of toxicity. Cultures & Sensitivities:    Date Site Micro Susceptibility / Result   11/2 Blood x2 NGTD      Labs / Ancillary Data:    Estimated Creatinine Clearance: 23 mL/min (A) (based on SCr of 4 mg/dL (H)). Recent Labs     11/02/21  0900 11/02/21  1730 11/03/21  0420 11/03/21  1625 11/04/21  0430   CREATININE  --    < > 3.9* 4.1* 4.0*   BUN  --    < > 126* 135* 145*   WBC 20.1*  --  20.1*  --  18.7*    < > = values in this interval not displayed.        Additional Lab Values / Findings of Note:   Procalcitonin (10/31) = 1.71 (10/31) -> 2.11 (11/1)

## 2021-11-05 NOTE — PROGRESS NOTES
0400 123/84 98.1 °F (36.7 °C) Oral 105 (!) 44 93 %     11/05/21 0345    106 (!) 44 93 %     11/05/21 0300 125/84   108 (!) 44 93 %     11/05/21 0200 125/84   111 (!) 44 93 %         Intake/Output Summary (Last 24 hours) at 11/5/2021 0911  Last data filed at 11/5/2021 0600  Gross per 24 hour   Intake 3457.44 ml   Output 2125 ml   Net 1332.44 ml       Review of Systems   Unable to perform ROS: Intubated       Physical Exam  Vitals reviewed. Constitutional:       General: He is not in acute distress. Appearance: He is well-developed and well-groomed. HENT:      Head: Normocephalic and atraumatic. Mouth/Throat:      Mouth: Mucous membranes are moist.      Pharynx: Oropharynx is clear. Eyes:      General: No scleral icterus. Extraocular Movements: Extraocular movements intact. Conjunctiva/sclera: Conjunctivae normal.      Pupils: Pupils are equal, round, and reactive to light. Cardiovascular:      Rate and Rhythm: Normal rate and regular rhythm. Pulses: Normal pulses. Heart sounds: Normal heart sounds, S1 normal and S2 normal. No murmur heard. Pulmonary:      Effort: Pulmonary effort is normal. No respiratory distress. Breath sounds: Normal breath sounds and air entry. Abdominal:      General: Abdomen is flat. Bowel sounds are normal.      Palpations: Abdomen is soft. Tenderness: There is no abdominal tenderness. Musculoskeletal:         General: Normal range of motion. Cervical back: Full passive range of motion without pain, normal range of motion and neck supple. Skin:     General: Skin is warm and dry. Neurological:      General: No focal deficit present. Mental Status: He is alert and oriented to person, place, and time. Cranial Nerves: Cranial nerves are intact. Sensory: Sensation is intact. Motor: Motor function is intact. Coordination: Coordination is intact. Gait: Gait is intact.       Deep Tendon Reflexes: Reflexes are normal and symmetric. Psychiatric:         Attention and Perception: Attention and perception normal.         Mood and Affect: Mood normal.         Speech: Speech normal.         Behavior: Behavior normal. Behavior is cooperative. Thought Content: Thought content normal.         Cognition and Memory: Cognition and memory normal.         Judgment: Judgment normal.         LABS:    CBC:   Recent Labs     11/03/21  0420 11/04/21  0430 11/05/21  0505   WBC 20.1* 18.7* 17.4*   HGB 11.2* 11.2* 10.0*   HCT 35.3* 34.0* 30.8*    165 171   MCV 93.2 90.8 91.0     Renal:    Recent Labs     11/04/21  0430 11/04/21  1730 11/05/21  0505    143 148*   K 5.8* 5.8* 5.1    105 108   CO2 23 25 27   * 143* 143*   CREATININE 4.0* 3.6* 3.5*   GLUCOSE 225* 272* 174*   CALCIUM 8.0* 8.3 8.1*   PHOS 8.2* 7.7* 6.1*   ANIONGAP 13 13 13     Hepatic:   Recent Labs     11/03/21  0420 11/03/21  1625 11/04/21  0430 11/04/21  1730 11/05/21  0505   AST 49*  --  27  --  26   ALT 74*  --  69*  --  80*   BILITOT 0.9  --  0.8  --  0.7   BILIDIR 0.8*  --  0.7*  --  0.6*   PROT 5.6*  --  5.9*  --  5.5*   LABALBU 2.5*   < > 2.7* 2.7* 2.6*   ALKPHOS 194*  --  142*  --  129    < > = values in this interval not displayed. Troponin: No results for input(s): TROPONINI in the last 72 hours. BNP: No results for input(s): BNP in the last 72 hours. Lipids: No results for input(s): CHOL, HDL in the last 72 hours. Invalid input(s): LDLCALCU, TRIGLYCERIDE  ABGs:    Recent Labs     11/04/21  2205 11/05/21  0506 11/05/21  0830   PHART 7.218* 7.269* 7.247*   ATY4KEC 71.4* 67.7* 74.4*   PO2ART 67.8* 86.5 72.6*   BOY0AUS 29 31* 32*   BEART 1.0 2.9 3.3*   [id number] 94 98 94   QQZ9MPA 31 33 35       INR: No results for input(s): INR in the last 72 hours.   Lactate:   Recent Labs     11/03/21  1012 11/03/21  1632   LACTATE 1.34 0.98     Cultures:  -----------------------------------------------------------------  RAD:   XR CHEST PORTABLE   Final Result   Impression: No significant interval change. Stable support devices. XR CHEST PORTABLE   Final Result      Extensive diffuse coarse consolidation mildly progressive since 11/3/2021. Stable cardiomediastinal silhouette. Lines and tubes without change. No evidence of pneumothorax. Subtle linear lucency adjacent to the right and left heart borders may represent pneumomediastinum. XR CHEST PORTABLE   Final Result      Lines and tubes unchanged. Diffuse airspace disease similar to prior study. XR CHEST PORTABLE   Final Result   Impression: Stable appearance of the chest. No discrete pneumothorax. XR CHEST PORTABLE   Final Result   1. No interval change   2. No evidence of pneumothorax             XR CHEST PORTABLE   Final Result      1. ET tube about 3 cm above the dayna. 2.  Stable small left apical pneumothorax. 3.  Stable appearance of diffuse   4. Stable appearance of diffuse new mediastinal and thoracic inlet soft tissue gas. XR CHEST PORTABLE   Final Result      1. Small left apical pneumothorax. Pneumomediastinum. Soft tissue gas in the neck and chest wall. Findings suggestive of barotrauma. 2.  ET tube 5 cm above the dayna. 3.  Enteric tube tip in the distal stomach. 4.  Extensive bilateral airspace disease compatible with severe COVID-19 pneumonia and/or ARDS. Critical results reported to ICU nurseInga, 7:40 AM on 10/31/21. XR ABDOMEN (KUB) (SINGLE AP VIEW)   Final Result      1. Small left apical pneumothorax. Pneumomediastinum. Soft tissue gas in the neck and chest wall. Findings suggestive of barotrauma. 2.  ET tube 5 cm above the dayna. 3.  Enteric tube tip in the distal stomach. 4.  Extensive bilateral airspace disease compatible with severe COVID-19 pneumonia and/or ARDS. Critical results reported to ICU nurseInga, 7:40 AM on 10/31/21.             XR CHEST PORTABLE    (Results Pending)       Assessment/Plan:      Acute kidney injury- worsening  Patient is critically ill with COVID-19, pulmonary embolism, and likely HCAP.  Currently being treated in the ICU.  Has a rising creatinine.  Now 3.1. Intubated, sedated and on paralytic (switched to Nimbex from vecuronium)  · Maintain blood pressure to keep map over 65  · Avoid nephrotoxic agents  · Strict I's and O  · Daily renal panel  · May need RRT if renal function worsens. Doing well at this time.        Hyperkalemia  Patient has a potassium of 6.5 this morning. Received IV insulin, etc  · Patient recie[facility]  · Continue to trend Renal Panel     Acute hypoxic respiratory failure secondary to severe ARDS. Patient has COVID-19 pneumonia, with the PE and likely HCAP.  Intubated, sedated and paralyzed in the ICU.   · Continue management per ICU team     Sepsis secondary to COVID-19 pneumonia and bacteremia  Patient is COVID-19 pneumonia and a secondary bacterial infection  · On antibiotics and treatment per primary team      This patient has been staffed and discussed with Dr. Marely Reza  -----------------------------  Zeyad Nava MD, PGY-3  11/5/2021  9:11 AM      Agree with plan above       Josefina Pantoja MD

## 2021-11-05 NOTE — PROGRESS NOTES
Comprehensive Nutrition Assessment    RECOMMENDATIONS:  1. PO Diet: Continue NPO  2. EN- adjust to EN Nepro @ trophic rate 10 ml/hr to prevent overfeeding with propofol. 3. Water flushes of 200mL q4h per MD d/t high Na     NUTRITION ASSESSMENT:   Type and Reason for Visit:  Type and Reason for Visit: Reassess   Nutritional summary & status: Pt remains intubated. Propofol @ 28.4ml/h providing 749kcal.  TF running at 15mL/h which is resulting in overfeeding. Adjust back to recommended 10mL/h trophic rate. Na 141. MD recommended incr water flushes from 30mL q4h to 200mL q4h. Phos elevated but trending downward. Still considering CRRT, nephrology following. Spoke directly w/ nurse about TF changes. Continuing to monitor TF rate, propofol, and renal labs while inpatient. Patient admitted d/t SOB - COVID-19 positive     PMH significant for: No PMHx listed in EMR    MALNUTRITION ASSESSMENT  Context of Malnutrition: Acute Illness   Malnutrition Status: Insufficient data      NUTRITION DIAGNOSIS   · Inadequate oral intake related to impaired respiratory function as evidenced by NPO or clear liquid status due to medical condition, intubation      [address] and/or Nutrient Delivery:  Continue NPO  Nutrition Education/Counseling:  No recommendation at this time   Goals:  Pt to tolerate most appropriate form of nutrition to meet 100% of nutritional needs. Nutrition Monitoring and Evaluation:   Food/Nutrient Intake Outcomes:  Food and Nutrient Intake  Physical Signs/Symptoms Outcomes:  Biochemical Data, Hemodynamic Status     OBJECTIVE DATA: Significant to nutrition assessment  · Nutrition-Focused Physical Findings: Nutrition Related Findings: NGT in R nare; lbm 11/2  · Labs: Reviewed;  Na 148; Phos 6.1; K WNL  · Meds: Reviewed; propofol  · Wounds: Wound Type: None     CURRENT NUTRITION THERAPIES  Diet NPO  ADULT TUBE FEEDING; Nasogastric; Renal Formula; Continuous; 10; No; 200; Q 4 hours  Current Tube Feeding (TF) Orders:  · Feeding Route: Nasogastric  · Formula: Peptide Based High Protein  · Schedule: Continuous  · Additives/Modulars:  (n/a )  · Water Flushes: 30 ml every 4 hours   · Current TF & Flush Orders Provides: VHP @ 10 ml/hr provides 240 ml, 240 kcal, 21 g protein 21 g protein, 201 ml free water. (Provides 384 g K+; 192 g Phos)   · Goal TF & Flush Orders Provides: Change to Nepro @ 10 ml/hr to provide 240 ml, 432 kcal, 19g protein and 262 ml free water. (Provides 254 mg K+ and 173 mg Phos)   Additional Calorie Sources:   Propofol 28. 4mL/h providing 749kcal  PO Intake: Average Meal Intake: NPO       ANTHROPOMETRICS  Current Height: 5' 6\" (167.6 cm)  Current Weight: 220 lb 3.8 oz (99.9 kg)    Admission weight: 208 lb 12.4 oz (94.7 kg)  Ideal Body Weight (lbs) (Calculated): 142 lbs (Ideal Body Weight (Kg) (Calculated): 65 kg)      BMI BMI (Calculated): 35.6    Wt Readings from Last 50 Encounters:   11/05/21 220 lb 3.8 oz (99.9 kg)       COMPARATIVE STANDARDS  Energy (kcal):  9347-0541 (11-14); Weight Used for Energy Requirements:  Admission (94.7 kg )  Protein (g):  117-130 (1.8-2.0); Weight Used for Protein Requirements:  Ideal (65 kg )        Fluid (ml/day):  1500 ml minimum; Method Used for Fluid Requirements:  Other (Comment)      The patient will still be monitored per nutrition standards of care. Consult dietitian if nutrition interventions essential to patient care is needed.      Eric Higgins [address], [address]  [place]:  [phone]  Office:  [phone]

## 2021-11-05 NOTE — PROGRESS NOTES
Nephrology Progress Note      Admit Date: 10/31/2021  Diet: Diet NPO  ADULT TUBE FEEDING; Nasogastric; Renal Formula; Continuous; 10; No; 30; Q 4 hours    Chief Complaint: Shortness of breath    Interval history:     Good Uo   Cr stable   K elevated but stable   BUN >100    Medications:     Scheduled Meds:   insulin glargine  10 Units SubCUTAneous Nightly    famotidine  20 mg Per NG tube Daily    vancomycin (VANCOCIN) intermittent dosing (placeholder)   Other RX Placeholder    sodium chloride flush  5-40 mL IntraVENous 2 times per day    [START ON 11/5/2021] dexamethasone  10 mg IntraVENous Q24H    insulin lispro  0-18 Units SubCUTAneous Q6H     Continuous Infusions:   cisatracurium (NIMBEX) infusion 1.5 mcg/kg/min (11/04/21 2300)    sodium bicarbonate infusion 50 mL/hr at 11/04/21 1438    heparin (PORCINE) Infusion 11 Units/kg/hr (11/04/21 2300)    dextrose      propofol 50 mcg/kg/min (11/04/21 2300)    fentaNYL 200 mcg/hr (11/04/21 2300)    sodium chloride       PRN Meds:heparin (porcine), heparin (porcine), glucose, dextrose, glucagon (rDNA), dextrose, sodium chloride flush, sodium chloride, ondansetron **OR** ondansetron, polyethylene glycol, acetaminophen **OR** acetaminophen    Objective:   Vitals:   T-max:  Patient Vitals for the past 8 hrs:   BP Temp Temp src Pulse Resp SpO2 Height   11/04/21 2300 132/88   115 (!) 44 92 %    11/04/21 2200 (!) 134/91   117 (!) 44 91 %    11/04/21 2100 (!) 135/92   119 (!) 44 91 %    11/04/21 2010    119 30 90 % 5' 6\" (1.676 m)   11/04/21 2000 (!) 142/95 97.9 °F (36.6 °C) Oral 121 (!) 44 (!) 89 %    11/04/21 1900 139/89   121 (!) 44 (!) 89 %    11/04/21 1800 (!) 140/91   118 (!) 44 (!) 89 %    11/04/21 1700 (!) 152/94   120 (!) 44 (!) 89 %    11/04/21 1600 (!) 149/98 97.9 °F (36.6 °C) Oral 121 (!) 44 (!) 88 %        Intake/Output Summary (Last 24 hours) at 11/4/2021 2330  Last data filed at 11/4/2021 2300  Gross per 24 hour   Intake 5625.41 ml   Output 2540 ml   Net 1165.41 ml       Review of Systems   Unable to perform ROS: Intubated       Physical Exam  Vitals reviewed. Constitutional:       General: He is not in acute distress. Appearance: He is well-developed and well-groomed. He is ill-appearing and toxic-appearing. HENT:      Head: Normocephalic and atraumatic. Mouth/Throat:      Mouth: Mucous membranes are moist.      Pharynx: Oropharynx is clear. Eyes:      General: No scleral icterus. Extraocular Movements: Extraocular movements intact. Conjunctiva/sclera: Conjunctivae normal.      Pupils: Pupils are equal, round, and reactive to light. Cardiovascular:      Rate and Rhythm: Normal rate and regular rhythm. Pulses: Normal pulses. Heart sounds: Normal heart sounds, S1 normal and S2 normal. No murmur heard. Pulmonary:      Effort: Pulmonary effort is normal. No respiratory distress. Breath sounds: Normal breath sounds and air entry. Comments: intubated and mechanically ventillated  Abdominal:      General: Abdomen is flat. Bowel sounds are normal.      Palpations: Abdomen is soft. Tenderness: There is no abdominal tenderness. Musculoskeletal:         General: Normal range of motion. Cervical back: Full passive range of motion without pain, normal range of motion and neck supple. Skin:     General: Skin is warm and dry. Neurological:      General: No focal deficit present. Mental Status: He is alert and oriented to person, place, and time. Cranial Nerves: Cranial nerves are intact. Sensory: Sensation is intact. Motor: Motor function is intact. Coordination: Coordination is intact. Gait: Gait is intact. Deep Tendon Reflexes: Reflexes are normal and symmetric.    Psychiatric:         Attention and Perception: Attention and perception normal.         Mood and Affect: Mood normal.         Speech: Speech normal.         Behavior: Behavior normal. Behavior is cooperative. Thought Content: Thought content normal.         Cognition and Memory: Cognition and memory normal.         Judgment: Judgment normal.         LABS:    CBC:   Recent Labs     11/02/21  0900 11/03/21  0420 11/04/21  0430   WBC 20.1* 20.1* 18.7*   HGB 11.2* 11.2* 11.2*   HCT 34.2* 35.3* 34.0*   * 158 165   MCV 90.8 93.2 90.8     Renal:    Recent Labs     11/03/21  1625 11/04/21 0430 11/04/21  1730    141 143   K 5.9* 5.8* 5.8*    105 105   CO2 22 23 25   * 145* 143*   CREATININE 4.1* 4.0* 3.6*   GLUCOSE 174* 225* 272*   CALCIUM 8.3 8.0* 8.3   PHOS 7.0* 8.2* 7.7*   ANIONGAP 11 13 13     Hepatic:   Recent Labs     11/02/21  0337 11/02/21 1730 11/03/21 0420 11/03/21  0420 11/03/21  1625 11/04/21 0430 11/04/21  1730   AST 27  --  49*  --   --  27  --    ALT 40  --  74*  --   --  69*  --    BILITOT 0.6  --  0.9  --   --  0.8  --    BILIDIR 0.5*  --  0.8*  --   --  0.7*  --    PROT 5.6*  --  5.6*  --   --  5.9*  --    LABALBU 2.5*   < > 2.5*   < > 2.6* 2.7* 2.7*   ALKPHOS 147*  --  194*  --   --  142*  --     < > = values in this interval not displayed. Troponin: No results for input(s): TROPONINI in the last 72 hours. BNP: No results for input(s): BNP in the last 72 hours. Lipids: No results for input(s): CHOL, HDL in the last 72 hours. Invalid input(s): LDLCALCU, TRIGLYCERIDE  ABGs:    Recent Labs     11/04/21  1114 11/04/21 1730 11/04/21  2205   PHART 7.201* 7.198* 7.218*   WDG6XOM 70.1* 72.8* 71.4*   PO2ART 67.4* 67.1* 67.8*   SNP1TDG 27 28 29   BEART -1.8 -1.1 1.0   M0UKAXMA 93 92* 94   WUJ5YYO 30 31 31       INR:   Recent Labs     11/02/21  0900   INR 1.02     Lactate:   Recent Labs     11/03/21  1012 11/03/21  1632   LACTATE 1.34 0.98     Cultures:  -----------------------------------------------------------------  RAD:   XR CHEST PORTABLE   Final Result      Extensive diffuse coarse consolidation mildly progressive since 11/3/2021.       Stable cardiomediastinal silhouette. Lines and tubes without change. No evidence of pneumothorax. Subtle linear lucency adjacent to the right and left heart borders may represent pneumomediastinum. XR CHEST PORTABLE   Final Result      Lines and tubes unchanged. Diffuse airspace disease similar to prior study. XR CHEST PORTABLE   Final Result   Impression: Stable appearance of the chest. No discrete pneumothorax. XR CHEST PORTABLE   Final Result   1. No interval change   2. No evidence of pneumothorax             XR CHEST PORTABLE   Final Result      1. ET tube about 3 cm above the dayna. 2.  Stable small left apical pneumothorax. 3.  Stable appearance of diffuse   4. Stable appearance of diffuse new mediastinal and thoracic inlet soft tissue gas. XR CHEST PORTABLE   Final Result      1. Small left apical pneumothorax. Pneumomediastinum. Soft tissue gas in the neck and chest wall. Findings suggestive of barotrauma. 2.  ET tube 5 cm above the dayna. 3.  Enteric tube tip in the distal stomach. 4.  Extensive bilateral airspace disease compatible with severe COVID-19 pneumonia and/or ARDS. Critical results reported to ICU nurse, Zulema Funes, 7:40 AM on 10/31/21. XR ABDOMEN (KUB) (SINGLE AP VIEW)   Final Result      1. Small left apical pneumothorax. Pneumomediastinum. Soft tissue gas in the neck and chest wall. Findings suggestive of barotrauma. 2.  ET tube 5 cm above the dayna. 3.  Enteric tube tip in the distal stomach. 4.  Extensive bilateral airspace disease compatible with severe COVID-19 pneumonia and/or ARDS. Critical results reported to ICU nurseZulema, 7:40 AM on 10/31/21. XR CHEST PORTABLE    (Results Pending)       Assessment/Plan:     Acute kidney injury- worsening  Patient is critically ill with COVID-19, pulmonary embolism, and likely HCAP. Currently being treated in the ICU. Has a rising creatinine.   Now 3.1. Intubated, sedated and on paralytic (vecuronium)  · Maintain blood pressure to keep map over 65  · Avoid nephrotoxic agents  · Strict I's and O  · Daily renal panel  · May need RRT if renal function worsens        Hyperkalemia  Patient has a potassium of 6.5 this morning. Received IV insulin, etc  · cont Lokelma  · Labs later today    Acute hypoxic respiratory failure secondary to severe ARDS. Patient has COVID-19 pneumonia, with the PE and likely HCAP. Intubated, sedated and paralyzed in the ICU.   · Continue management per ICU team     Sepsis secondary to COVID-19 pneumonia and bacteremia  Patient is COVID-19 pneumonia and a secondary bacterial infection  · On antibiotics and treatment per primary team          -----------------------------  Lupis John MD,

## 2021-11-05 NOTE — CARE COORDINATION
Case Management Assessment           Daily Note                 Date/ Time of Note: 11/5/2021 9:47 AM         Note completed by: CONTRERAS De Jesus, BIBIW    Patient Name: Margot Shannon  YOB: 1970    Diagnosis:Pneumonia due to COVID-19 virus [U07.1, J12.82]  Patient Admission Status: Inpatient    Date of Admission:10/31/2021  5:37 AM Length of Stay: 5 GLOS: GMLOS: 4.8    Current Plan of Care: vent settings changed, CREAT decreased, possible CRRT  ________________________________________________________________________________________  PT AM-PAC:   / 24 per last evaluation on: tbd    OT AM-PAC:   / 24 per last evaluation on: tbd    DME Needs for discharge: tbd  ________________________________________________________________________________________  Discharge Plan: To Be Determined DUE TO: intubated/sedated    Tentative discharge date: tbd    Current barriers to discharge: not medically ready    Referrals completed: Not Applicable    Resources/ information provided: Not indicated at this time  ________________________________________________________________________________________  Case Management Notes:  Pt from home with spouse, pt was independent prior to admission. CM will continue to follow for DC needs and recs. Lance Spatz and his family were provided with choice of provider; he and his family are in agreement with the discharge plan.     Care Transition Patient: CONTRERAS Son, [facility]  Case Management Department  Ph: [phone]

## 2021-11-05 NOTE — PROGRESS NOTES
Clinical Pharmacy Progress Note    Vancomycin - Management by Pharmacy    Consult Date(s): 10/31  Consulting Provider(s): Dr Juan Manuel Cano / Plan    1. HAP,  Bacteremia (per OSH culture) - Vancomycin   Concurrent Antimicrobials: None.  Day of Vanc Therapy: #6 (unclear when started at OSH)   Current Dosing Method: Intermittent   Therapeutic Goal: 15-20 mcg/mL   Current Dose / Frequency: Intermittent dosing   Plan / Rationale:  o SCr decreased to 3.5 mg/dL this AM; UOP 1 mL/kg/hr. Given TRINIDAD and changing renal function, will continue to dose vancomycin intermittently via levels at this time. o Random vancomycin level this AM was 15.4 mcg/mL. Will re-dose with vancomycin 1g IV x 1. Given improved renal function today, will obtain a follow-up random level tomorrow AM.         [facility] Will continue to monitor clinical condition and make adjustments to regimen as appropriate. Addendum 11/5/2021 9:38 AM  - Vancomycin has been discontinued - Pharmacy will sign off on dosing  - If vancomycin is resumed, please re-consult Pharmacy     Thank you for consulting Pharmacy! Trina Bowers, DarleenD, [facility]  Wireless: [phone]  11/5/2021 7:40 AM      Interval Update: Patient remains intubated, sedated, and paralyzed. Renal function starting to improve. Subjective/Objective: Mr. Ada Shah is a 45 y.o. male with no charted PMHx at this time. Patient started with COVID sx on 10/16, and diagnosed with +COVID test on 10/21. Admitted to OSH (in [place]) on 10/26 with SOB requiring supplemental O2; placed on dexamethasone, doxycycline and CTX per notes for PNA. Found to have acute PE. Respiratory function worsened,and pt was intubated 10/30 and transferred to [facility]. Blood cx + with Staph and respiratory cx with GPC. Pharmacy has been consulted to dose vancomycin per Dr Jackson Baptiste.     Height:   Ht Readings from Last 1 Encounters:   11/05/21 5' 6\" (1.676 m)     Weight:   Wt Readings from Last 1 Encounters:   11/05/21 220 lb 3.8 oz (99.9 kg)       Level(s) / Doses:  Date Time Dose Level / Type of Level Interpretation   10/31 05:55  Random = 23.6 mcg/ml · Drawn upon admission to [facility]. · Per ICU Rn, no MAR from OSH sent. Unclear prior vanc dosing. 10/31 12:13  Random = 16.2 mcg/mL · Drawn ~6 hrs after last random level. · Calculated half-life ~12 hrs. 11/2 03:37 1g IV q12h -> intermittent dosing d/t TRINIDAD Random = 20.4 mcg/mL · Obtained ~25 hrs after the last dose  · Continue to hold dose   11/3 04:20 --- Random = 17.5 mcg/mL · Calculated half-life ~113 hrs  · Re-dose with 1g IV x 1   11/4  --- --- · Continue to hold dose   11/5 05:05 1g IV x 1 on 11/3 at 08:12 Random = 15.4 mcg/mL · Drawn ~45 hrs after previous dose  · Re-dose with 1g IV x 1   Note: Serum levels collected for AUC-based dosing may be high if collected in close proximity to the dose administered. This is not necessarily an indicator of toxicity. Cultures & Sensitivities:    Date Site Micro Susceptibility / Result   11/2 Blood x2 NGTD      Labs / Ancillary Data:    Estimated Creatinine Clearance: 27 mL/min (A) (based on SCr of 3.5 mg/dL (H)). Recent Labs     11/03/21  0420 11/03/21  1625 11/04/21  0430 11/04/21  1730 11/05/21  0505   CREATININE 3.9*   < > 4.0* 3.6* 3.5*   *   < > 145* 143* 143*   WBC 20.1*  --  18.7*  --  17.4*    < > = values in this interval not displayed.        Additional Lab Values / Findings of Note:   Procalcitonin (10/31) = 1.71 (10/31) -> 2.11 (11/1)

## 2021-11-05 NOTE — PROGRESS NOTES
Patient has been admitted to the ICU, patient is Covid positive, intubated, in isolation. To minimize the exposure, and preserve the PPE patient was not seen physically. We will defer the management to intensive care unit team.      Once the patient is Covid negative/out of the ICU will assume care. Please call with questions.       Matilde Rhodes MD

## 2021-11-05 NOTE — PROGRESS NOTES
W Bypass Day: 6  ICU Day:6                                                     Code:Full Code  Admit Date: 10/31/2021  PCP: No primary care provider on file. CC: COVID PNA    INTERVAL HISTORY:  No acute overnight events. Patient intubated and sedated. Vent day 6. Afebrile, Tachycardic in NSR, I/Os 3457.4/2345. ABG @ 5 AM 7.269/67.7/86.5/31. On fentanyl 200, 50 propofol, switched from vercuronium yesterday to Nimbex. Patient's creatinine trending up since hospitalization. Inpatient consult to nephrology sent. HISTORY OF PRESENT ILLNESS:     53-year-old male presented to ER on [place], [place] for shortness of breath on 10/25. Patient experiencing shortness of breath on 10/16, was diagnosed with Covid on 10/21 and was put on antibiotics and dexamethasone with albuterol inhaler. However his symptoms worsened which prompted the ED visit in the ED CTA showed nonocclusive pulmonary embolism in the anterior portion of the left lower lobe with filling defects extending to the several proximal segment of the left lower lobe. During the admission at the outside hospital patient refused remdesivir, Actemra but was agreeable to steroids. Patient was initially on 15 L nonrebreather however oxygenation worsened as the days progressed and patient was unable to tolerate high flow O2 therapy and CPAP secondary to anxiety. Outside hospital attempted proning however did not help with oxygenation. Decision was made to intubate the patient and transfer to the [facility].    Patient was initially started on doxycycline and azithromycin for possible superimposed pneumonia. Infectious work-up from outside hospital shows nonspeciated staph in his blood and gram-positive cocci in this respiratory culture. PAST HISTORY:   No past medical history on file. No past surgical history on file.     SocialHistory:   The patient lives at home  Alcohol: unknown  Illicit drugs: no use  Tobacco: none     Family History:  No family history on file. MEDICATIONS:     No current facility-administered medications on file prior to encounter. No current outpatient medications on file prior to encounter. Scheduled Meds:   insulin glargine  10 Units SubCUTAneous Nightly    famotidine  20 mg Per NG tube Daily    vancomycin (VANCOCIN) intermittent dosing (placeholder)   Other RX Placeholder    sodium chloride flush  5-40 mL IntraVENous 2 times per day    dexamethasone  10 mg IntraVENous Q24H    insulin lispro  0-18 Units SubCUTAneous Q6H      Continuous Infusions:   cisatracurium (NIMBEX) infusion 1.5 mcg/kg/min (11/05/21 0600)    sodium bicarbonate infusion 50 mL/hr at 11/04/21 1438    heparin (PORCINE) Infusion 11 Units/kg/hr (11/05/21 0600)    dextrose      propofol 50 mcg/kg/min (11/05/21 0600)    fentaNYL 200 mcg/hr (11/05/21 0600)    sodium chloride       PRN Meds:heparin (porcine), heparin (porcine), glucose, dextrose, glucagon (rDNA), dextrose, sodium chloride flush, sodium chloride, ondansetron **OR** ondansetron, polyethylene glycol, acetaminophen **OR** acetaminophen    Allergies: Allergies   Allergen Reactions    Shellfish-Derived Products Nausea And Vomiting       PHYSICAL EXAM:   Vitals: /82   Pulse 102   Temp 98.1 °F (36.7 °C) (Oral)   Resp (!) 44   Ht 5' 6\" (1.676 m)   Wt 220 lb 3.8 oz (99.9 kg)   SpO2 92%   BMI 35.55 kg/m²     I/O:      Intake/Output Summary (Last 24 hours) at 11/5/2021 0701  Last data filed at 11/5/2021 0600  Gross per 24 hour   Intake 3457.44 ml   Output 2345 ml   Net 1112.44 ml     No intake/output data recorded. I/O last 3 completed shifts: In: 3457.4 [I.V.:2778.4; NG/GT:579; IV Piggyback:100]  Out: 2345 [Urine:2345]    Physical Exam  Constitutional:       General: He is not in acute distress. Appearance: Normal appearance. He is normal weight.  He is not ill-appearing, toxic-appearing or diaphoretic. Comments: Intubated, sedated   Eyes:      Pupils: Pupils are equal, round, and reactive to light. Cardiovascular:      Rate and Rhythm: Regular rhythm. Tachycardia present. Pulses: Normal pulses. Heart sounds: Murmur heard. Pulmonary:      Effort: Pulmonary effort is normal. No respiratory distress. Breath sounds: Wheezing and rhonchi present. Abdominal:      General: Bowel sounds are normal. There is no distension. Palpations: Abdomen is soft. Musculoskeletal:         General: No swelling. Recent Labs     11/04/21 2205 11/05/21  0506   PHART 7.218* 7.269*   KTE7DYY 71.4* 67.7*   PO2ART 67.8* 86.5     DATA:   Labs:  CBC:   Recent Labs     11/03/21 0420 11/04/21 0430 11/05/21  0505   WBC 20.1* 18.7* 17.4*   HGB 11.2* 11.2* 10.0*   HCT 35.3* 34.0* 30.8*    165 171       BMP:   Recent Labs     11/04/21 0430 11/04/21  1730 11/05/21  0505    143 148*   K 5.8* 5.8* 5.1    105 108   CO2 23 25 27   * 143* 143*   CREATININE 4.0* 3.6* 3.5*   GLUCOSE 225* 272* 174*   PHOS 8.2* 7.7* 6.1*     LFT's:   Recent Labs     11/03/21 0420 11/04/21  0430 11/05/21  0505   AST 49* 27 26   ALT 74* 69* 80*   BILITOT 0.9 0.8 0.7   ALKPHOS 194* 142* 129     Troponin: No results for input(s): TROPONINI in the last 72 hours. BNP:No results for input(s): BNP in the last 72 hours. ABGs:   Recent Labs     11/04/21 2205 11/05/21  0506   PHART 7.218* 7.269*   FIS1STQ 71.4* 67.7*   PO2ART 67.8* 86.5     INR:   Recent Labs     11/02/21  0900   INR 1.02       U/A:No results for input(s): NITRITE, COLORU, PHUR, LABCAST, WBCUA, RBCUA, MUCUS, TRICHOMONAS, YEAST, BACTERIA, CLARITYU, SPECGRAV, LEUKOCYTESUR, UROBILINOGEN, BILIRUBINUR, BLOODU, GLUCOSEU, AMORPHOUS in the last 72 hours. Invalid input(s): KETONESU    XR CHEST PORTABLE   Final Result   Impression: No significant interval change. Stable support devices.       XR CHEST PORTABLE   Final Result Extensive diffuse coarse consolidation mildly progressive since 11/3/2021. Stable cardiomediastinal silhouette. Lines and tubes without change. No evidence of pneumothorax. Subtle linear lucency adjacent to the right and left heart borders may represent pneumomediastinum. XR CHEST PORTABLE   Final Result      Lines and tubes unchanged. Diffuse airspace disease similar to prior study. XR CHEST PORTABLE   Final Result   Impression: Stable appearance of the chest. No discrete pneumothorax. XR CHEST PORTABLE   Final Result   1. No interval change   2. No evidence of pneumothorax             XR CHEST PORTABLE   Final Result      1. ET tube about 3 cm above the dayna. 2.  Stable small left apical pneumothorax. 3.  Stable appearance of diffuse   4. Stable appearance of diffuse new mediastinal and thoracic inlet soft tissue gas. XR CHEST PORTABLE   Final Result      1. Small left apical pneumothorax. Pneumomediastinum. Soft tissue gas in the neck and chest wall. Findings suggestive of barotrauma. 2.  ET tube 5 cm above the dayna. 3.  Enteric tube tip in the distal stomach. 4.  Extensive bilateral airspace disease compatible with severe COVID-19 pneumonia and/or ARDS. Critical results reported to ICU nurseInga, 7:40 AM on 10/31/21. XR ABDOMEN (KUB) (SINGLE AP VIEW)   Final Result      1. Small left apical pneumothorax. Pneumomediastinum. Soft tissue gas in the neck and chest wall. Findings suggestive of barotrauma. 2.  ET tube 5 cm above the dayna. 3.  Enteric tube tip in the distal stomach. 4.  Extensive bilateral airspace disease compatible with severe COVID-19 pneumonia and/or ARDS. Critical results reported to ICU nurseInga, 7:40 AM on 10/31/21.             XR CHEST PORTABLE    (Results Pending)     ASSESSMENT AND PLAN:     Respiratory:   Acute hypoxic respiratory failure requiring intubation   Severe ARDS  Sedated and paralysed 2/2 increased work of breathing  COVID-19 PNA, day 15 since COVID diagnosis  - Patient diagnosed on 10/21/2021  Vent Mode: PRVC Rate Set: 44 bmp/Vt Ordered: 450 mL/ /FiO2 : 100 %  - Sedated and paralysed with propofol, fentanyl and Nimbex. - s/p Decadron 20 mg for 5 days, On 10 mg Decadron.   - continue full dose lovenox  - ABG @ 5 AM 7.269/67.7/86.5/31. Small left apical PTX  Pneumomediastinum   - No intervention needed at this moment  - Daily CXR -showed no pneumothorax but did show subtle pneumomediastinum. Pulmonary embolism  - on therapeutic lovenox    ID:    Bactermia, unspeciated staph  Sepsis   - Outside hospital blood cx grew staph (unspeciated), resp cultures also had gram +ve cocci  - Vanc for sepsis. Not in shock. Follow infectious workup  - Elevated pro-calcitonin 2.11  - F/U Blood cultures showed no growth. Cardiac:  Sinus tachycardia  - monitor. Renal:  TRINIDAD  - Monitor UOP   - Trend Cr 3.5> 4.0>2.0>1.8>1.4  - Inpatient consult to Nephrology    Progressive Hyperkalemia:  - Elevated K, Nephrology following.   - Trending down to normal, on Lokelma. GI:  Elevated liver enzymes  - Trend daily as pt is on vec  - No stool. Hyperglycemia:  - Patient on 10 lantus and HDSSI,   - Plan to increase the dose of lantus. Code Status:Full Code  FEN: NPO  PPX: Heparin  DISPO: ICU    This patient will be staffed and discussed with Dr Ada Tellez MD  -----------------------------  Nicole Anguiano MD, PGY-1  11/5/2021  7:01 AM    Patient was seen, examined and discussed with Luis Winn. I agree with the interval history. My physical exam confirms the findings listed below  Chart was reviewed including labs and medical records confirm the findings noted     Acute respiratory failure on mechanical vent support. VT 450, RR 44, FiO2 90% PEEP 10  ABG 7.24/74/72. Sedated and paralyzed   CXR - stable    COVID19 pneumonia   TRINIDAD - creatinine finally start improving - down to 3.5. UOP 2.3L over the past 24 hours with net positive 1.1L. Hyperkalemia - resolved. Mild hypernatremia   Leukocytosis at 17  ?staph bacteremia from the other hospital.    Hx of PE.  on heparin drip       Continue vent support. Decrease PEEP to 8. Peak pressure improved from 48 to 41   Heparin drip  D/c vanc   TF - increase free water. D/c bicarb drip     Pt has a high probability of imminent or life-threatening deterioration requiring close monitoring, and highly complex decision-making and/or interventions of high intensity to assess, manipulate, and support his critical organ systems to prevent a likely inevitable decline which could occur if left untreated.      A total critical care time 35 minutes was used. This includes but not limited to examining patient, collaborating with other physicians, monitoring vital signs, telemetry, continuous pulse oximetry, and clinical response to IV medications, documentation time, review and interpretation of laboratory and radiological data, review of nursing notes and old record review.  This time excludes any time that may have been spent performing procedures for life threatening organ failure

## 2021-11-05 NOTE — PLAN OF CARE
Problem: Nutrition  Goal: Optimal nutrition therapy  Outcome: Ongoing     Nutrition Problem #1: Inadequate oral intake  Intervention: Food and/or Nutrient Delivery: Continue NPO  Nutritional Goals: Pt to tolerate most appropriate form of nutrition to meet 100% of nutritional needs.

## 2021-11-06 NOTE — PROGRESS NOTES
Patient has been admitted to the ICU, patient is Covid positive, intubated. To minimize the exposure, and preserve the PPE patient was not seen physically. We will defer the management to intensive care unit team.      Once the patient is Covid negative/out of the ICU will assume care. Please call with questions.       Claire Malone MD

## 2021-11-06 NOTE — PROGRESS NOTES
Nephrology Progress Note    Admit Date: 10/31/2021  Diet: Diet NPO  ADULT TUBE FEEDING; Nasogastric; Renal Formula; Continuous; 10; No; 250; Q 4 hours    Chief Complaint: Shortness of breath    Interval history:   Creatinine stable   Good UO   K better on Lokalema       Medications:     Scheduled Meds:   insulin glargine  15 Units SubCUTAneous Nightly    senna  5 mL Oral Nightly    polyethylene glycol  17 g Oral BID    sodium zirconium cyclosilicate  10 g Oral TID    famotidine  20 mg Per NG tube Daily    sodium chloride flush  5-40 mL IntraVENous 2 times per day    dexamethasone  10 mg IntraVENous Q24H    insulin lispro  0-18 Units SubCUTAneous Q6H     Continuous Infusions:   cisatracurium (NIMBEX) infusion 1 mcg/kg/min (11/06/21 0912)    heparin (PORCINE) Infusion 13 Units/kg/hr (11/06/21 0600)    dextrose      propofol 50 mcg/kg/min (11/06/21 0929)    fentaNYL 200 mcg/hr (11/06/21 1110)    sodium chloride       PRN Meds:heparin (porcine), heparin (porcine), glucose, dextrose, glucagon (rDNA), dextrose, sodium chloride flush, sodium chloride, ondansetron **OR** ondansetron, polyethylene glycol, acetaminophen **OR** acetaminophen    Objective:   Vitals:   T-max:  Patient Vitals for the past 8 hrs:   BP Temp Temp src Pulse Resp SpO2 Height Weight   11/06/21 1130  100.1 °F (37.8 °C) Axillary        11/06/21 1115    126 (!) 44 91 %     11/06/21 1100 (!) 142/95   126 (!) 44 91 %     11/06/21 1045    126 (!) 44 92 %     11/06/21 1030 (!) 145/93   124 (!) 44 92 %     11/06/21 1015    124 (!) 44 92 %     11/06/21 1000 (!) 145/93   123 (!) 44 92 %     11/06/21 0945    123 (!) 44 92 %     11/06/21 0930 (!) 146/94   122 (!) 44 91 %     11/06/21 0900 (!) 143/94   122 (!) 44 95 %     11/06/21 0845    124 (!) 44 95 %     11/06/21 0830 (!) 146/93   123 (!) 44 95 %     11/06/21 0828     (!) 44 94 %     11/06/21 0815    123 (!) 44 94 %     11/06/21 0800 (!) 147/94   123 (!) 44 94 %     11/06/21 0745    125 (!) 44 94 %     11/06/21 0730 (!) 140/88 100 °F (37.8 °C) Axillary 124 (!) 44 94 %     11/06/21 0600 139/88   122 (!) 44 93 % 5' 6\" (1.676 m) 222 lb 14.2 oz (101.1 kg)   11/06/21 0554     (!) 45      11/06/21 0500 (!) 142/88   122 (!) 44 93 %     11/06/21 0401    120 (!) 44 93 %     11/06/21 0400 (!) 142/89 99.2 °F (37.3 °C) Axillary 120 (!) 44 93 %         Intake/Output Summary (Last 24 hours) at 11/6/2021 1150  Last data filed at 11/6/2021 1130  Gross per 24 hour   Intake 3382.74 ml   Output 2980 ml   Net 402.74 ml       Review of Systems   Unable to perform ROS: Intubated       Physical Exam  Vitals reviewed. Constitutional:       General: He is not in acute distress. Appearance: He is well-developed and well-groomed. HENT:      Head: Normocephalic and atraumatic. Mouth/Throat:      Mouth: Mucous membranes are moist.      Pharynx: Oropharynx is clear. Eyes:      General: No scleral icterus. Extraocular Movements: Extraocular movements intact. Conjunctiva/sclera: Conjunctivae normal.      Pupils: Pupils are equal, round, and reactive to light. Cardiovascular:      Rate and Rhythm: Normal rate and regular rhythm. Pulses: Normal pulses. Heart sounds: Normal heart sounds, S1 normal and S2 normal. No murmur heard. Pulmonary:      Effort: Pulmonary effort is normal. No respiratory distress. Breath sounds: Normal breath sounds and air entry. Abdominal:      General: Abdomen is flat. Bowel sounds are normal.      Palpations: Abdomen is soft. Tenderness: There is no abdominal tenderness. Musculoskeletal:         General: Normal range of motion. Cervical back: Full passive range of motion without pain, normal range of motion and neck supple. Skin:     General: Skin is warm and dry. Neurological:      General: No focal deficit present.       Mental Status: He is alert and oriented to person, place, and time. Cranial Nerves: Cranial nerves are intact. Sensory: Sensation is intact. Motor: Motor function is intact. Coordination: Coordination is intact. Gait: Gait is intact. Deep Tendon Reflexes: Reflexes are normal and symmetric. Psychiatric:         Attention and Perception: Attention and perception normal.         Mood and Affect: Mood normal.         Speech: Speech normal.         Behavior: Behavior normal. Behavior is cooperative. Thought Content: Thought content normal.         Cognition and Memory: Cognition and memory normal.         Judgment: Judgment normal.         LABS:    CBC:   Recent Labs     11/04/21  0430 11/05/21  0505 11/06/21  0445   WBC 18.7* 17.4* 21.2*   HGB 11.2* 10.0* 10.4*   HCT 34.0* 30.8* 31.7*    171 230   MCV 90.8 91.0 91.8     Renal:    Recent Labs     11/05/21  0505 11/05/21  1745 11/06/21  0445   * 142 147*   K 5.1 5.8* 5.5*    105 106   CO2 27 28 29   * 145* 141*   CREATININE 3.5* 3.6* 3.6*   GLUCOSE 174* 230* 123*   CALCIUM 8.1* 8.3 8.6   PHOS 6.1* 6.7* 6.9*   ANIONGAP 13 9 12     Hepatic:   Recent Labs     11/04/21  0430 11/04/21  1730 11/05/21  0505 11/05/21  1745 11/06/21  0445   AST 27  --  26  --  32   ALT 69*  --  80*  --  96*   BILITOT 0.8  --  0.7  --  0.9   BILIDIR 0.7*  --  0.6*  --  0.7*   PROT 5.9*  --  5.5*  --  6.0*   LABALBU 2.7*   < > 2.6* 2.7* 2.8*   ALKPHOS 142*  --  129  --  133*    < > = values in this interval not displayed. Troponin: No results for input(s): TROPONINI in the last 72 hours. BNP: No results for input(s): BNP in the last 72 hours. Lipids: No results for input(s): CHOL, HDL in the last 72 hours.     Invalid input(s): LDLCALCU, TRIGLYCERIDE  ABGs:    Recent Labs     11/05/21  1745 11/05/21  2254 11/06/21  0445   PHART 7.254* 7.269* 7.257*   [id number] 71.8* 70.2* 74.8*   PO2ART 78.0 81.2 81.1   WJC4YSJ 32* 32* 33*   BEART 3.0 4.6* 6.5*   [id number] 95 97 barotrauma. 2.  ET tube 5 cm above the dayna. 3.  Enteric tube tip in the distal stomach. 4.  Extensive bilateral airspace disease compatible with severe COVID-19 pneumonia and/or ARDS. Critical results reported to ICU nurse, Himanshu Campbell, 7:40 AM on 10/31/21. XR CHEST PORTABLE    (Results Pending)       Assessment/Plan:      Acute kidney injury  · Maintain blood pressure to keep map over 65  · Avoid nephrotoxic agents  · Strict I's and O  · Daily renal panel  · May need RRT if renal function worsens        Hyperkalemia  Patient has a potassium of 6.5 this morning. Received IV insulin, etc  · Patient receiving  Helyn Axon  · Continue to trend Renal Panel     Acute hypoxic respiratory failure secondary to severe ARDS. Patient has COVID-19 pneumonia, with the PE and likely HCAP.  Intubated, sedated and paralyzed in the ICU.   · Continue management per ICU team     Sepsis secondary to COVID-19 pneumonia and bacteremia  Patient is COVID-19 pneumonia and a secondary bacterial infection  · On antibiotics and treatment per primary team      D/w ICU team       Catrina Luong MD

## 2021-11-06 NOTE — PLAN OF CARE
Problem: Infection:  Goal: Will remain free from infection  Description: Will remain free from infection  Outcome: Ongoing     Problem: Safety:  Goal: Free from accidental physical injury  Description: Free from accidental physical injury  Outcome: Ongoing  Goal: Free from intentional harm  Description: Free from intentional harm  Outcome: Ongoing     Problem: Daily Care:  Goal: Daily care needs are met  Description: Daily care needs are met  Outcome: Ongoing     Problem: Pain:  Goal: Patient's pain/discomfort is manageable  Description: Patient's pain/discomfort is manageable  Outcome: Ongoing     Problem: Skin Integrity:  Goal: Skin integrity will stabilize  Description: Skin integrity will stabilize  Outcome: Ongoing     Problem: Discharge Planning:  Goal: Patients continuum of care needs are met  Description: Patients continuum of care needs are met  Outcome: Ongoing     Problem: Skin Integrity:  Goal: Will show no infection signs and symptoms  Description: Will show no infection signs and symptoms  Outcome: Ongoing  Note: Pt has redness to R side tip of nose. L side of mouth is abraded. Scattered bruising is noted. Hemorrhoids at rectum are friable. Sacrum intact, sacral heart for prevention. Prevalon boots in place. Turning and repositioning q2hr with pillow support. Extremities elevated off of bed. Will continue to monitor. Goal: Absence of new skin breakdown  Description: Absence of new skin breakdown  Outcome: Ongoing     Problem: Falls - Risk of:  Goal: Will remain free from falls  Description: Will remain free from falls  Outcome: Ongoing  Note: Pt is a fall risk. Fall risk protocol in place. See Lisa Polly Fall Score. Pt bed is in low position, bed alarm is on, side rails up, fall risk bracelet applied, non-skid footwear in use. Patient/family educated on fall risk protocol, instructed to call for assistance when needed and belongings are in reach. Will continue with hourly rounds for po intake, pain needs, toileting and repositioning as needed. Will continue to monitor for needs.     Goal: Absence of physical injury  Description: Absence of physical injury  Outcome: Ongoing     Problem: Nutrition  Goal: Optimal nutrition therapy  Outcome: Ongoing     Problem: Gas Exchange - Impaired:  Goal: Levels of oxygenation will improve  Description: Levels of oxygenation will improve  Outcome: Ongoing

## 2021-11-06 NOTE — PROGRESS NOTES
W Bypass Day: 6  ICU Day:6                                                     Code:Full Code  Admit Date: 10/31/2021  PCP: GENERIC EXTERNAL DATA PROVIDER                                CC: COVID PNA    INTERVAL HISTORY:  Andrei Mckeon. Patient did have a low-grade temperature of 100 and continues to be tachycardic overnight. . tidal volume of 450, respiratory rate set at 44, patient does not seem to be breathing over the vent, PEEP of 8, FiO2 of 100%, patient saturating at 94%, peak pressure at 44. On propofol 50, fentanyl 200 and Nimbex 1.5. Creatinine down to 3.6 from 4 about 3 days ago which states kid func is stable. Making good urine with a 2.8 L overnight and net +2.6 L since admission. BMP significant for sodium of 147 and a potassium of 5.5. On heparin drip for PE  ABG this morning shows a pH of 7.25/74.8/81. 1. Patient did have a low-grade temperature of 100 and continues to be tachycardic overnight. Continues to have leukocytosis around 20. On Decadron 10 mg IV daily. He is having scant amount of sputum overnight which is tan in color. CXR this am seem similar to prior. No PTX      HISTORY OF PRESENT ILLNESS:     24-year-old male presented to ER on [place], [place] for shortness of breath on 10/25. Patient experiencing shortness of breath on 10/16, was diagnosed with Covid on 10/21 and was put on antibiotics and dexamethasone with albuterol inhaler. However his symptoms worsened which prompted the ED visit in the ED CTA showed nonocclusive pulmonary embolism in the anterior portion of the left lower lobe with filling defects extending to the several proximal segment of the left lower lobe. During the admission at the outside hospital patient refused remdesivir, Actemra but was agreeable to steroids.   Patient was initially on 15 L nonrebreather however oxygenation worsened as the days progressed and patient was unable to tolerate high flow O2 therapy and CPAP secondary to anxiety. Outside hospital attempted proning however did not help with oxygenation. Decision was made to intubate the patient and transfer to the [facility].    Patient was initially started on doxycycline and azithromycin for possible superimposed pneumonia. Infectious work-up from outside hospital shows nonspeciated staph in his blood and gram-positive cocci in this respiratory culture. PAST HISTORY:   No past medical history on file. No past surgical history on file. SocialHistory:   The patient lives at home  Alcohol: unknown  Illicit drugs: no use  Tobacco: none     Family History:  No family history on file. MEDICATIONS:     No current facility-administered medications on file prior to encounter. No current outpatient medications on file prior to encounter. Scheduled Meds:   insulin glargine  15 Units SubCUTAneous Nightly    senna  5 mL Oral Nightly    polyethylene glycol  17 g Oral BID    sodium zirconium cyclosilicate  10 g Oral TID    famotidine  20 mg Per NG tube Daily    sodium chloride flush  5-40 mL IntraVENous 2 times per day    dexamethasone  10 mg IntraVENous Q24H    insulin lispro  0-18 Units SubCUTAneous Q6H      Continuous Infusions:   cisatracurium (NIMBEX) infusion 1.5 mcg/kg/min (11/06/21 0737)    heparin (PORCINE) Infusion 13 Units/kg/hr (11/06/21 0600)    dextrose      propofol 50 mcg/kg/min (11/06/21 0600)    fentaNYL 200 mcg/hr (11/06/21 0600)    sodium chloride       PRN Meds:heparin (porcine), heparin (porcine), glucose, dextrose, glucagon (rDNA), dextrose, sodium chloride flush, sodium chloride, ondansetron **OR** ondansetron, polyethylene glycol, acetaminophen **OR** acetaminophen    Allergies:    Allergies   Allergen Reactions    Shellfish-Derived Products Nausea And Vomiting       PHYSICAL EXAM:   Vitals: BP (!) 140/88   Pulse 124   Temp 100 °F (37.8 °C) (Axillary)   Resp (!) 44   Ht 5' 6\" (1.676 m)   Wt 222 lb 14.2 oz (101.1 kg)   SpO2 94%   BMI 35.97 kg/m²     I/O:      Intake/Output Summary (Last 24 hours) at 11/6/2021 0821  Last data filed at 11/6/2021 0730  Gross per 24 hour   Intake 3082.74 ml   Output 2910 ml   Net 172.74 ml     I/O this shift:  In: -   Out: 155 [Urine:155]  I/O last 3 completed shifts: In: 3112.7 [I.V.:1532.7; NG/GT:1580]  Out: 1311 [Urine:2805]       Physical Exam  Constitutional:       General: He is not in acute distress. Appearance: Normal appearance. He is normal weight. He is not ill-appearing, toxic-appearing or diaphoretic. Comments: Intubated, sedated   Eyes:      Pupils: Pupils are equal, round, and reactive to light. Cardiovascular:      Rate and Rhythm: Regular rhythm. Tachycardia present. Pulses: Normal pulses. Heart sounds: Murmur heard. Pulmonary:      Effort: Pulmonary effort is normal. No respiratory distress. Breath sounds: no ronchi or wheezing present. Abdominal:      General: Bowel sounds are normal. There is no distension. Palpations: Abdomen is soft. Musculoskeletal:         General: No swelling. Recent Labs     11/05/21 2254 11/06/21 0445   PHART 7.269* 7.257*   CVI3ETD 70.2* 74.8*   PO2ART 81.2 81.1     DATA:   Labs:  CBC:   Recent Labs     11/04/21 0430 11/05/21  0505 11/06/21 0445   WBC 18.7* 17.4* 21.2*   HGB 11.2* 10.0* 10.4*   HCT 34.0* 30.8* 31.7*    171 230       BMP:   Recent Labs     11/05/21  0505 11/05/21 1745 11/06/21  0445   * 142 147*   K 5.1 5.8* 5.5*    105 106   CO2 27 28 29   * 145* 141*   CREATININE 3.5* 3.6* 3.6*   GLUCOSE 174* 230* 123*   PHOS 6.1* 6.7* 6.9*     LFT's:   Recent Labs     11/04/21  0430 11/05/21  0505 11/06/21  0445   AST 27 26 32   ALT 69* 80* 96*   BILITOT 0.8 0.7 0.9   ALKPHOS 142* 129 133*     Troponin: No results for input(s): TROPONINI in the last 72 hours. BNP:No results for input(s): BNP in the last 72 hours.   ABGs:   Recent Labs     11/05/21  2254 11/06/21  0445   PHART Final Result      1. Small left apical pneumothorax. Pneumomediastinum. Soft tissue gas in the neck and chest wall. Findings suggestive of barotrauma. 2.  ET tube 5 cm above the dayna. 3.  Enteric tube tip in the distal stomach. 4.  Extensive bilateral airspace disease compatible with severe COVID-19 pneumonia and/or ARDS. Critical results reported to ICU nurse, Himanshu Campbell, 7:40 AM on 10/31/21. XR CHEST PORTABLE    (Results Pending)     ASSESSMENT AND PLAN:     Respiratory:   Acute hypoxic respiratory failure requiring intubation   Severe ARDS  Sedated and paralysed 2/2 increased work of breathing  COVID-19 PNA, day 15 since COVID diagnosis  - Patient diagnosed on 10/21/2021   tidal volume of 450, respiratory rate set at 44, patient does not seem to be breathing over the vent, PEEP of 8, FiO2 of 100%, patient saturating at 94%, peak pressure at 44- Sedated and paralysed with propofol, fentanyl and Nimbex. - s/p Decadron 20 mg for 5 days, On 10 mg Decadron. Day 7 of steroids   - continue full dose lovenox  - ABG this am 7.25/74.8/81. 1. Small left apical PTX  - No intervention needed at this moment  - CXR this am stable. Pulmonary embolism  - on heparin gtt      Renal:  TRINIDAD  - Kidney func stable  - Trend Cr 3 > 4 > 3.5 > 3.6  -making adequate urine  -no indication for dialysis at this time     Hyperkalemia -improving  - 5.5 this am  -  on Lokelma. Code Status:Full Code  FEN: TFs from NG tube (inc free water to combat hypernatremia currently 250 q4)  PPX: Heparin  DISPO: ICU    This patient will be staffed and discussed with Dr Antoni Henao MD  -----------------------------  Ada Matute MD, PGY-2  11/6/2021  8:21 AM    Patient was seen, examined and discussed with . I agree with the interval history.  My physical exam confirms the findings listed below  Chart was reviewed including labs and medical records confirm the findings noted     Acute respiratory failure on mechanical vent support. VT 450, RR 44, FiO2 100% PEEP 8  ABG 7.25/74/81. Sedated and paralyzed   CXR - stable  (getting daily CXRs as he had small pneumothorax earlier)  COVID19 pneumonia   TRINIDAD - creatinine is stable at 3.6. Good UOP 2.8L over the past 24 hours. Net positive 307  Hyperkalemia - restarted on lokelma. Mild hypernatremia   Leukocytosis at 21  ?staph bacteremia from the other hospital.    Hx of PE.  on heparin drip       Continue vent support. Decrease FiO2 to 90  Heparin drip  TF - increase free water.       Pt has a high probability of imminent or life-threatening deterioration requiring close monitoring, and highly complex decision-making and/or interventions of high intensity to assess, manipulate, and support his critical organ systems to prevent a likely inevitable decline which could occur if left untreated.      A total critical care time 35 minutes was used. This includes but not limited to examining patient, collaborating with other physicians, monitoring vital signs, telemetry, continuous pulse oximetry, and clinical response to IV medications, documentation time, review and interpretation of laboratory and radiological data, review of nursing notes and old record review.  This time excludes any time that may have been spent performing procedures for life threatening organ failure

## 2021-11-07 NOTE — PROGRESS NOTES
Clinical Pharmacy Progress Note    Vancomycin - Management by Pharmacy    Consult Date(s): 11/7  Consulting Provider(s): Dr. Willie Dias / Plan  Bloodstream Infection - Vancomycin   Concurrent Antimicrobials: Meropenem, Vancomycin   Day of Vanc Therapy: 1 - received 6 days of therapy previously during admission   Current Dosing Method: Intermittent Dosing by Levels   Therapeutic Goal: ~ 15 mg/L   Current Dose / Frequency: Intermittent Dosing   Plan / Rationale:   o Patient previously receiving intermittent dosing - last dose given 11/5. Will obtain a stat random level and re-dose as appropriate.  Will continue to monitor clinical condition and make adjustments to regimen as appropriate. Thank you for consulting Pharmacy! Minerva Rausch, PharmD  Main Pharmacy: [id number]  11/7/2021 4:17 PM      Interval update:     Subjective/Objective: Mr. Ha Elena is a 45 y.o. male  with no charted PMHx at this time. Patient started with COVID sx on 10/16, and diagnosed with +COVID test on 10/21. Admitted to OSH (in [place]) on 10/26 with SOB requiring supplemental O2; placed on dexamethasone, doxycycline and CTX per notes for PNA. Found to have acute PE. Respiratory function worsened,and pt was intubated 10/30 and transferred to [facility]. Blood cx + with Staph and respiratory cx with GPC. Received 6 days of vancomycin before DC 11/5. Patient no requiring pressure support. ABx restarted. Pharmacy has been consulted to dose vancomycin. Height:   Ht Readings from Last 1 Encounters:   11/07/21 5' 6\" (1.676 m)     Weight:   Wt Readings from Last 1 Encounters:   11/07/21 224 lb 10.4 oz (101.9 kg)     Current & Prior Antimicrobial Regimen(s):   Meropenem 500 mg q12h    Vancomycin - intermittent dosing     Level(s) / Doses:  Date Time Dose Level / Type of Level Interpretation   10/31 05:55   Random = 23.6 mcg/ml · Drawn upon admission to [facility]. Per ICU Rn, no MAR from OSH sent.   · Unclear prior vanc dosing. 10/31 12:13   Random = 16.2 mcg/mL · Drawn ~6 hrs after last random level. · Calculated half-life ~12 hrs. 11/2 03:37 1g IV q12h -> intermittent dosing d/t TRINIDAD Random = 20.4 mcg/mL · Obtained ~25 hrs after the last dose  · Continue to hold dose   11/3 04:20 --- Random = 17.5 mcg/mL · Calculated half-life ~113 hrs  · Re-dose with 1g IV x 1   11/4   --- --- · Continue to hold dose   11/5 05:05 1g IV x 1 on 11/3 at 08:12 Random = 15.4 mcg/mL · Drawn ~45 hrs after previous dose  · Re-dose with 1g IV x 1   11/7 16:50 1 g IV x1 on 11/5 at 08:23 Random = 13.1 mcg/mL · Drawn ~56 hrs after previous dose  · Re-dose with 1g IV x1   Note: Serum levels collected for AUC-based dosing may be high if collected in close proximity to the dose administered. This is not necessarily an indicator of toxicity.     Cultures & Sensitivities:    Date Site Micro Susceptibility / Result   11/2 Blood No growth    11/7 Blood Sent      Labs / Ancillary Data:    Estimated Creatinine Clearance: 23 mL/min (A) (based on SCr of 4.2 mg/dL (H)). Recent Labs     11/05/21  0505 11/05/21  1745 11/06/21  0445 11/06/21  0445 11/06/21  1705 11/07/21  0424 11/07/21  1510   CREATININE 3.5*   < > 3.6*   < > 3.8* 4.0* 4.2*   *   < > 141*   < > 142* 138* 138*   WBC 17.4*  --  21.2*  --   --  16.8*  --     < > = values in this interval not displayed.

## 2021-11-07 NOTE — PROGRESS NOTES
Attempted to obtain sample for respiratory culture x2 during this shift.  Unable to obtain due to pt not having secretions when suctioned

## 2021-11-07 NOTE — PROGRESS NOTES
Meropenem 1000 mg q12h ordered for patient. This medication is renally eliminated. Will change to 500 mg q24h per renal dose adjustment policy. Estimated Creatinine Clearance: 23 mL/min (A) (based on SCr of 4.2 mg/dL (H)). Pharmacy will continue to monitor renal function and adjust dose as necessary. Please call with any questions. Thanks!   Yaquelin Albert, PharmD  Main Pharmacy: [id number]  11/7/2021 4:05 PM

## 2021-11-07 NOTE — PROGRESS NOTES
Hospitalist Progress Note      PCP: GENERIC EXTERNAL DATA PROVIDER    Date of Admission: 10/31/2021    Chief Complaint on Admission: shortness of breath    Pt Seen/Examined and Chart Reviewed. Admitting dx respiratory failure due to COVID-19 pneumonia    SUBJECTIVE/OBJECTIVE:   Patient is out of isolation protocol. He is intubated, sedated, on paralytic. Allergies  Shellfish-derived products    Medications      Scheduled Meds:   insulin glargine  15 Units SubCUTAneous Nightly    senna  5 mL Oral Nightly    polyethylene glycol  17 g Oral BID    famotidine  20 mg Per NG tube Daily    sodium chloride flush  5-40 mL IntraVENous 2 times per day    dexamethasone  10 mg IntraVENous Q24H    insulin lispro  0-18 Units SubCUTAneous Q6H       Infusions:   lactated ringers 100 mL/hr at 21 1054    cisatracurium (NIMBEX) infusion 1.5 mcg/kg/min (21 1000)    heparin (PORCINE) Infusion 13 Units/kg/hr (21 1000)    dextrose      propofol 50 mcg/kg/min (21 1000)    fentaNYL 200 mcg/hr (21 1059)    sodium chloride         PRN Meds:  perflutren lipid microspheres, heparin (porcine), heparin (porcine), glucose, dextrose, glucagon (rDNA), dextrose, sodium chloride flush, sodium chloride, ondansetron **OR** ondansetron, polyethylene glycol, acetaminophen **OR** acetaminophen    Vitals    TEMPERATURE:  Current - Temp: 100.8 °F (38.2 °C);  Max - Temp  Av.4 °F (38 °C)  Min: 99.7 °F (37.6 °C)  Max: 101 °F (38.3 °C)  RESPIRATIONS RANGE: Resp  Av.8  Min: 30  Max: 44  PULSE RANGE: Pulse  Av.3  Min: 123  Max: 135  BLOOD PRESSURE RANGE:  Systolic (84LTG), TMI:425 , Min:131 , MSP:682   ; Diastolic (51NTJ), KWM:68, Min:85, Max:104    PULSE OXIMETRY RANGE: SpO2  Av.3 %  Min: 86 %  Max: 92 %  24HR INTAKE/OUTPUT:      Intake/Output Summary (Last 24 hours) at 2021 1150  Last data filed at 2021 1100  Gross per 24 hour   Intake 5092.32 ml   Output 4465 ml   Net 627.32 ml Exam:      General appearance: Intubated and sedated  Lungs: Mechanical breath sounds  Heart: tachy, regular rate and rhythm with Normal L0/A8 with soft systolic murmur  Abdomen: Soft, non-tender or non-distended without rigidity or guarding and positive bowel sounds all four quadrants. Extremities: No clubbing, cyanosis, some edema present  Skin: Skin color, texture, turgor normal.    Neurologic: Intubated and sedated, on Nimbex          Data    Recent Labs     11/05/21  0505 11/06/21 0445 11/07/21  0424   WBC 17.4* 21.2* 16.8*   HGB 10.0* 10.4* 8.8*   HCT 30.8* 31.7* 27.4*    230 203      Recent Labs     11/06/21  0445 11/06/21  1705 11/07/21  0424   * 145 148*   K 5.5* 5.5* 5.1    104 108   CO2 29 28 29   PHOS 6.9* 8.3* 6.0*   * 142* 138*   CREATININE 3.6* 3.8* 4.0*     Recent Labs     11/05/21  0505 11/06/21 0445 11/07/21  0424   AST 26 32 49*   ALT 80* 96* 102*   BILIDIR 0.6* 0.7* 0.5*   BILITOT 0.7 0.9 0.7   ALKPHOS 129 133* 118     No results for input(s): INR in the last 72 hours. No results for input(s): CKTOTAL, CKMB, CKMBINDEX, TROPONINI in the last 72 hours. Consults:     IP CONSULT TO CRITICAL CARE  IP CONSULT TO DIETITIAN  IP CONSULT TO [address] [place] [address] Problems    Diagnosis Date Noted    [facility]AD [facility]acute [facility]) [facility] [N17.9]     Electrolyte imbalance [E87.8]     Acute respiratory failure with hypoxia ([facility] [J96.01] 10/31/2021    Pneumothorax on left [J93.9] 10/31/2021    Pneumomediastinum ([facility] [J98.2] 10/31/2021    Pneumonia due to COVID-19 virus [U07.1, J12.82] 10/29/2021         ASSESSMENT AND PLAN      Acute hypoxic respiratory failure secondary to COVID-19 pneumonia:  Continues to have high oxygen requirements on the vent  Diagnosed on 10/14, intubated on 10/30  On IV Decadron  Per records patient declined treatment with Tocilizumab    Pneumothorax, pneumomediastinum:  Resolved    PE:   On heparin drip    TRINIDAD:  Nephrology is following  Urine output remains decent, holding off on dialysis  Lokelma to control potassium    Persistent tachycardia:  Work-up for secondary infection is underway by ICU team  Will obtain ECHO      DVT Prophylaxis: Heparin drip  Diet: Diet NPO  ADULT TUBE FEEDING; Nasogastric; Renal Formula; Continuous; 10; No; 250; Q 4 hours  Code Status: Full Code    PT/OT Eval Status: Not stable  Dispo -inpatient    Carmen Pierre MD

## 2021-11-07 NOTE — PROGRESS NOTES
ICU Progress Note  PGY-1    Admit Date: 10/31/2021  ICU Day: Hospital Day: 8  Intubated 10/30  Vent Settings: Vent Mode: PRVC Rate Set: 44 bmp/Vt Ordered: 450 mL/ /FiO2 : 100 %  IV Access: RIJ  IV Fluids:  Vasopressors: n/a   Antibiotics: n/a  Diet: Diet NPO  ADULT TUBE FEEDING; Nasogastric; Renal Formula; Continuous; 10; No; 250; Q 4 hours  Code Status: Full Code       Interval history:  NAEO  Mild tan secretions. He continues to have temperature of 100.8 with tmax 101 overnight. Fever curve uptrend ing since 10/6. Increased sinus tachycardia this morning to 130s. satting 88% with vent support.  ABG 7.38/58.3/60.8, sats 88% on vent of 100/450/44/8, peak 45, plt 40   Drips: fentyl @ 200, profol @ 50, nimbex @ 1.5, heparin gtt @ 13U  4/4 TOF, nimbex had to be increased   UOP: 4.1L, net + 600 ml   Labs with mild hypernatremia 148, creatine continues to rise to 4 but K 5.1 on lokelma  Small increase in ALT, AST  Leukocytosis down to 16.8, H/H 8.8   CXR w/o penmo, does appear to have worsening GG opacities     Medications:   Scheduled Meds:   insulin glargine  15 Units SubCUTAneous Nightly    senna  5 mL Oral Nightly    polyethylene glycol  17 g Oral BID    famotidine  20 mg Per NG tube Daily    sodium chloride flush  5-40 mL IntraVENous 2 times per day    dexamethasone  10 mg IntraVENous Q24H    insulin lispro  0-18 Units SubCUTAneous Q6H       Continuous Infusions:   cisatracurium (NIMBEX) infusion 1.5 mcg/kg/min (11/07/21 0912)    heparin (PORCINE) Infusion 13 Units/kg/hr (11/07/21 0615)    dextrose      propofol 50 mcg/kg/min (11/07/21 0914)    fentaNYL 200 mcg/hr (11/07/21 0615)    sodium chloride         PRN Meds:  heparin (porcine), heparin (porcine), glucose, dextrose, glucagon (rDNA), dextrose, sodium chloride flush, sodium chloride, ondansetron **OR** ondansetron, polyethylene glycol, acetaminophen **OR** acetaminophen    Vital/I&O/Physical examination:   VS:  BP (!) 152/94   Pulse 133   Temp 100.8 °F (38.2 °C) (Axillary)   Resp (!) 44   Ht 5' 6\" (1.676 m)   Wt 224 lb 10.4 oz (101.9 kg)   SpO2 (!) 88%   BMI 36.26 kg/m²     I/O:    Intake/Output Summary (Last 24 hours) at 11/7/2021 0923  Last data filed at 11/7/2021 0900  Gross per 24 hour   Intake 4818.9 ml   Output 4600 ml   Net 218.9 ml       PE:  Physical Exam  Constitutional:       Interventions: He is sedated and intubated. Cardiovascular:      Rate and Rhythm: Regular rhythm. Tachycardia present. Heart sounds: Murmur heard. Pulmonary:      Effort: He is intubated. Breath sounds: No wheezing. Comments: Mechanical bretah sounds, crackles   Abdominal:      General: Bowel sounds are normal. There is no distension. Palpations: Abdomen is soft. Musculoskeletal:         General: No swelling. Right lower leg: No edema. Left lower leg: No edema. Labs & Imaging:   LABS:  Renal:   Recent Labs     11/06/21 0445 11/06/21 1705 11/07/21  0424   * 145 148*   K 5.5* 5.5* 5.1    104 108   CO2 29 28 29   * 142* 138*   CREATININE 3.6* 3.8* 4.0*   GLUCOSE 123* 199* 105*   ANIONGAP 12 13 11     CBC:   Recent Labs     11/05/21  0505 11/06/21 0445 11/07/21  0424   WBC 17.4* 21.2* 16.8*   HGB 10.0* 10.4* 8.8*   HCT 30.8* 31.7* 27.4*    230 203   MCV 91.0 91.8 91.7                            Hepatic:   Recent Labs     11/05/21  0505 11/06/21 0445 11/07/21  0424   AST 26 32 49*   ALT 80* 96* 102*   BILITOT 0.7 0.9 0.7   ALKPHOS 129 133* 118     Troponin: No results for input(s): TROPONINI in the last 72 hours. BNP: No results for input(s): BNP in the last 72 hours. Lipids: No results for input(s): CHOL, HDL in the last 72 hours. Invalid input(s): LDLCALCU, TRIGLYCERIDE  INR: No results for input(s): INR in the last 72 hours. Lactate: No results for input(s): LACTATE in the last 72 hours.   ABGs:  Recent Labs     11/06/21  1238 11/06/21 2015 11/07/21  0424   PHART 7.235* 7.239* 7.375 [id number] 75.9* 78.0* 58.3*   PO2ART 64.4* 75.8 60.8*   SIO5YDA 32.2* 33* 34*   BEART 5* 4.3* 8.2*   O2HOAHPF 87* 94 93   [id number] 35 36 36       UA:No results for input(s): NITRITE, COLORU, PHUR, LABCAST, WBCUA, RBCUA, MUCUS, TRICHOMONAS, YEAST, BACTERIA, CLARITYU, SPECGRAV, LEUKOCYTESUR, UROBILINOGEN, BILIRUBINUR, BLOODU, GLUCOSEU, AMORPHOUS in the last 72 hours. Invalid input(s): KETONESU     IMAGING:  XR CHEST PORTABLE   Final Result   1. Stable chest with diffuse extensive pulmonary groundglass airspace disease   2. No interval development of pneumothorax or pneumomediastinum      XR CHEST PORTABLE   Final Result   1. Stable chest      XR CHEST PORTABLE   Final Result   Impression: No significant interval change. Stable support devices. XR CHEST PORTABLE   Final Result      Extensive diffuse coarse consolidation mildly progressive since 11/3/2021. Stable cardiomediastinal silhouette. Lines and tubes without change. No evidence of pneumothorax. Subtle linear lucency adjacent to the right and left heart borders may represent pneumomediastinum. XR CHEST PORTABLE   Final Result      Lines and tubes unchanged. Diffuse airspace disease similar to prior study. XR CHEST PORTABLE   Final Result   Impression: Stable appearance of the chest. No discrete pneumothorax. XR CHEST PORTABLE   Final Result   1. No interval change   2. No evidence of pneumothorax             XR CHEST PORTABLE   Final Result      1. ET tube about 3 cm above the dayna. 2.  Stable small left apical pneumothorax. 3.  Stable appearance of diffuse   4. Stable appearance of diffuse new mediastinal and thoracic inlet soft tissue gas. XR CHEST PORTABLE   Final Result      1. Small left apical pneumothorax. Pneumomediastinum. Soft tissue gas in the neck and chest wall. Findings suggestive of barotrauma. 2.  ET tube 5 cm above the dayna. 3.  Enteric tube tip in the distal stomach. 4.  Extensive bilateral airspace disease compatible with severe COVID-19 pneumonia and/or ARDS. Critical results reported to ICU nurse, Al Ibarra, 7:40 AM on 10/31/21. XR ABDOMEN (KUB) (SINGLE AP VIEW)   Final Result      1. Small left apical pneumothorax. Pneumomediastinum. Soft tissue gas in the neck and chest wall. Findings suggestive of barotrauma. 2.  ET tube 5 cm above the dayna. 3.  Enteric tube tip in the distal stomach. 4.  Extensive bilateral airspace disease compatible with severe COVID-19 pneumonia and/or ARDS. Critical results reported to ICU nurse, Al Ibarra, 7:40 AM on 10/31/21. XR CHEST PORTABLE    (Results Pending)       Assessment & Plan:      Acute hypoxic respiratory failure requiring mechanical ventilation   ARDS 2/2 Covid 19 pneumonia   Tested + 10/14, intubated 1/30    - sedated and paralyzed  - on decadron day 8 (complted 5 days of 20 mg)  - CXR w/ worsening opacities bilaterally   - increasing paralytic to 1.5 has improved oxygen sats to 88-90. Fever  Does have some leukocytosis but it has improved. CXR does show worsening opacities. Does have some tan secretions   - bcx, resp cx ordered     Small left apical PTX   - not seen on CXR this morning   - daily cxr     Pulmonary embolism  - on heparin gtt     TRINIDAD   Hyperkalemia   - creatinine continues to rise to , however pt is making good urine output and K is controlled with lokelma. BP remain in SBP 120s, MAP > 65   - nephrology following     Hypernatremia   148. Free water was increased y/o 300ml q4    Code Status: Full Code  FEN: IDiet NPO  ADULT TUBE FEEDING; Nasogastric; Renal Formula; Continuous; 10; No; 250; Q 4 hours  PPX:heparin gtt, pepcid  DISPO: ICU    This patient will be discussed with attending, Dr Hua Fenton MD PGY- 2  Contact via [employer]  11/7/2021,  9:23 AM    Patient was seen, examined and discussed with Merritt Najera. I agree with the interval history.  My physical exam confirms the findings listed below  Chart was reviewed including labs and medical records confirm the findings noted     Acute respiratory failure on mechanical vent support. EQ 569, RR 44, FiO2 100% PEEP 8  ABG 7.37/58/60.  Sedated and paralyzed   CXR - stable  (getting daily CXRs as he had small pneumothorax earlier)  COVID19 pneumonia   Sinus tachycardia is slowly creeping up with the temp over the past 36 hours    TRINIDAD - creatinine is up to 4.0 again. UOP 4.1L over the past 24 hours  Hyperkalemia - better on lokelma.    Mild hypernatremia   Leukocytosis at 16.8  ?staph bacteremia from the other hospital.    Hx of PE.  on heparin drip       Continue vent support. decrease VT to 425. Peak pressure improved from 46 to 41. ABG in one hour   Heparin drip  TF - increase free water.    Recheck cultures  Start LR at 100 mL/hr for one liter      Pt has a high probability of imminent or life-threatening deterioration requiring close monitoring, and highly complex decision-making and/or interventions of high intensity to assess, manipulate, and support his critical organ systems to prevent a likely inevitable decline which could occur if left untreated.      A total critical care time 35 minutes was used. This includes but not limited to examining patient, collaborating with other physicians, monitoring vital signs, telemetry, continuous pulse oximetry, and clinical response to IV medications, documentation time, review and interpretation of laboratory and radiological data, review of nursing notes and old record review.  This time excludes any time that may have been spent performing procedures for life threatening organ failure

## 2021-11-08 NOTE — FLOWSHEET NOTE
11/08/21 1800   Vitals   Pulse 96   Resp (!) 0   /72   MAP (mmHg) 87   Art Line   ABP (Arterial line BP) 95/66   ABP mean (Arterial line mean) 78 mmHg   Oxygen Therapy   SpO2 (!) 77 %   FiO2  100 %     Residents updated on pts decrease in SPO2. Other vital signs are stable. Gerard and levo infusing. Nephrology updated by this RN on pts renal panel results.

## 2021-11-08 NOTE — PROGRESS NOTES
Results for ARIAS JONES RAY   Ref.  Range 11/8/2021 13:35   pH, Arterial Latest Ref Range: 7.350 - 7.450  7.144 (LL)   pCO2, Arterial Latest Ref Range: 35.0 - 45.0 mm Hg 58.7 (H)   pO2, Arterial Latest Ref Range: 75.0 - 108.0 mm Hg 65.1 (L)   HCO3, Arterial Latest Ref Range: 21.0 - 29.0 mmol/L 20.2 (L)   TCO2 (calc), Art Latest Ref Range: Not Established mmol/L 22   Base Excess, Arterial Latest Ref Range: -3 - 3  -9 (L)   O2 Sat, Arterial Latest Ref Range: 93 - 100 % 85 (L)       Dr Vish Lou made aware of ABG and O2 sats

## 2021-11-08 NOTE — CONSULTS
Department of General Surgery  Surgical Service    Line Consultation    Reason for Consult: Access     [place]:  Nadia Loya is a 45 y.o. male recently admitted on 10/31 with COVID PNA complicated by bacterial pneumonia and PE. Patient requires vascular access for TRINIDAD with hyperkalemia. Therefore general surgery has been consulted for assistance with access. Previous central line attempts this admission: Right IJ CVC  Central line attempt prior to this admission: Unknown  Current Access: R IJ CVC  Anticoagulation: Hep gtt (to be held for 4-6 hours prior to [address] attempt  Antiplatelet: None  INR: 1.72  Platelet Count: [phone]    Past Medical History:        Diagnosis Date    History of 2019 novel coronavirus disease (COVID-19) 10/14/2021       Past Surgical History:    No past surgical history on file. Allergies:  Shellfish-derived products    Medications:   Home Meds  No current facility-administered medications on file prior to encounter. No current outpatient medications on file prior to encounter.        Current Meds  prismaSATE BGK 2/0 5,000 mL with calcium gluconate 12.51 mEq solution, Continuous  prismaSATE BGK 2/0 5,000 mL with calcium gluconate 12.51 mEq solution, Continuous  prismaSATE BGK 2/0 5,000 mL with calcium gluconate 12.51 mEq solution, Continuous  potassium chloride 20 mEq/50 mL IVPB (Central Line), PRN  magnesium sulfate 1000 mg in dextrose 5% 100 mL IVPB, PRN  sodium phosphate 6 mmol in dextrose 5 % 250 mL IVPB, PRN   Or  sodium phosphate 12 mmol in dextrose 5 % 250 mL IVPB, PRN   Or  sodium phosphate 18 mmol in dextrose 5 % 500 mL IVPB, PRN   Or  sodium phosphate 24 mmol in dextrose 5 % 500 mL IVPB, PRN  calcium gluconate 1,000 mg in dextrose 5 % 100 mL IVPB, PRN   Or  calcium gluconate 2,000 mg in dextrose 5 % 100 mL IVPB, PRN   Or  calcium gluconate 3,000 mg in dextrose 5 % 100 mL IVPB, PRN   Or  calcium gluconate 4,000 mg in dextrose 5 % 100 mL IVPB, PRN  heparin (porcine) injection 1,000 Units, Once  perflutren lipid microspheres (DEFINITY) injection 1.65 mg, ONCE PRN  sodium zirconium cyclosilicate (LOKELMA) oral suspension 10 g, Daily  meropenem (MERREM) 500 mg IVPB (mini-bag), Q12H  phenylephrine (GAUTAM-SYNEPHRINE) 50 mg in dextrose 5 % 250 mL infusion, Continuous  vancomycin (VANCOCIN) intermittent dosing (placeholder), See Admin Instructions  norepinephrine (LEVOPHED) 16 mg in dextrose 5 % 250 mL infusion, Continuous  acetaminophen (TYLENOL) tablet 1,000 mg, Q6H PRN   Or  acetaminophen (TYLENOL) suppository 650 mg, Q6H PRN  insulin glargine (LANTUS;BASAGLAR) injection pen 15 Units, Nightly  senna (SENOKOT) 8.8 MG/5ML syrup 8.8 mg, Nightly  polyethylene glycol (GLYCOLAX) packet 17 g, BID  cisatracurium besylate (NIMBEX) 200 mg in sodium chloride 0.9 % 100 mL infusion, Continuous  heparin (porcine) injection 7,690 Units, PRN  heparin (porcine) injection 3,840 Units, PRN  heparin 25,000 units in dextrose 5% 250 mL (premix) infusion, Continuous  famotidine (PEPCID) tablet 20 mg, Daily  glucose (GLUTOSE) 40 % oral gel 15 g, PRN  dextrose 50 % IV solution, PRN  glucagon (rDNA) injection 1 mg, PRN  dextrose 5 % solution, PRN  propofol injection, Titrated  fentaNYL (SUBLIMAZE) 1,000 mcg in sodium chloride 0.9% 100 mL infusion, Continuous  sodium chloride flush 0.9 % injection 5-40 mL, 2 times per day  sodium chloride flush 0.9 % injection 5-40 mL, PRN  0.9 % sodium chloride infusion, PRN  ondansetron (ZOFRAN-ODT) disintegrating tablet 4 mg, Q8H PRN   Or  ondansetron (ZOFRAN) injection 4 mg, Q6H PRN  polyethylene glycol (GLYCOLAX) packet 17 g, Daily PRN  dexamethasone (DECADRON) injection 10 mg, Q24H  insulin lispro (1 Unit Dial) 0-18 Units, Q6H        Family History:   No family history on file. Social History:   TOBACCO:   has no history on file for tobacco use. ETOH:   has no history on file for alcohol use. DRUGS:   has no history on file for drug use.     Review of Systems:   A 14 point review of systems was conducted and all pertinent positives and negatives were included in the HPI. Physical exam:    Vitals:    11/08/21 0000 11/08/21 0001 11/08/21 0100 11/08/21 0154   BP: 120/78  119/82    Pulse: 114 114 111    Resp: (!) 44 (!) 44 (!) 44    Temp: 102.4 °F (39.1 °C)   101.7 °F (38.7 °C)   TempSrc: Bladder      SpO2: (!) 87% (!) 87% (!) 86%    Weight:       Height:           General appearance: intubated, sedated, no acute distress, grooming appropriate  Eyes: No scleral icterus  Neck: trachea midline, no JVD, no lymphadenopathy, neck supple, R IJ CVC in place, dressing c/d/i  Chest/Lungs: Equal excursion bilaterally, mechanical breath sounds bilaterally  Cardiovascular: RRR, brisk capillary refill distally  Abdomen: Soft, non-tender, non-distended, no rebound, guarding, or rigidity. Skin: warm and dry, no rashes  Extremities: no edema, no cyanosis  Genitourinary: Tenorio in place with clear yellow urine  Neuro: A&Ox3, no focal deficits, sensation intact    Labs:    CBC:   Recent Labs     11/05/21  0505 11/06/21  0445 11/07/21  0424   WBC 17.4* 21.2* 16.8*   HGB 10.0* 10.4* 8.8*   HCT 30.8* 31.7* 27.4*   MCV 91.0 91.8 91.7    230 203     BMP:   Recent Labs     11/07/21  0424 11/07/21  1510 11/07/21  2240   * 149* 142   K 5.1 5.3* 6.2*    108 101   CO2 29 27 20*   PHOS 6.0* 6.5* 10.2*   * 138* 142*   CREATININE 4.0* 4.2* 4.9*     PT/INR: No results for input(s): PROTIME, INR in the last 72 hours. APTT: No results for input(s): APTT in the last 72 hours.   Liver Profile:   Lab Results   Component Value Date    AST 49 11/07/2021     11/07/2021    BILIDIR 0.5 11/07/2021    BILITOT 0.7 11/07/2021    ALKPHOS 118 11/07/2021     Lab Results   Component Value Date    TRIG 391 11/05/2021     UA:   Lab Results   Component Value Date    COLORU Yellow 11/07/2021    PHUR 6.0 11/07/2021    WBCUA 3-5 11/07/2021    RBCUA >100 11/07/2021    BACTERIA Rare 11/07/2021    CLARITYU reported to ICU nurse, Juli Ramos, 7:40 AM on 10/31/21. XR ABDOMEN (KUB) (SINGLE AP VIEW)   Final Result      1. Small left apical pneumothorax. Pneumomediastinum. Soft tissue gas in the neck and chest wall. Findings suggestive of barotrauma. 2.  ET tube 5 cm above the dayna. 3.  Enteric tube tip in the distal stomach. 4.  Extensive bilateral airspace disease compatible with severe COVID-19 pneumonia and/or ARDS. Critical results reported to ICU nurse, Juli Ramos, 7:40 AM on 10/31/21. XR CHEST PORTABLE    (Results Pending)   XR CHEST PORTABLE    (Results Pending)       ASSESSMENT AND PLAN:    Luh De La Fuente is a 45 y.o.  male who presents with COVID PNA requires a vascular catheter for CRRT given oliguria and hyperkalemia. Patient requires central access for CRRT  Plan to attempt line placement of L IJ as the patient already has a R IJ CVC  Update: Successful placement of L IJ VasCath. Wire placement confirmed with ultrasound imaging, line placement confirmed with CXR with the tip of the catheter located in the junction between the left brachiocephalic and SVC. Case discussed with Dr. Vish Young. Brunilda Moscoso DO, MS  PGY1, General Surgery  11/08/21  11:23 AM  [phone]    Discussed management with the resident. I reviewed the resident's note and agree with the documented findings and plan of care.     Jordi Bullard M.D.  11/8/21   11:48 AM

## 2021-11-08 NOTE — PROGRESS NOTES
Assessment/Plan


Problem List:  


(1) ESRF (end stage renal failure)


(2) Pulmonary edema


(3) Acute hyperkalemia


Assessment








(1) ESRF (end stage renal failure)


(2) Fever, leukocytosis


(3) Acute hyperkalemia


(4) Pulmonary edema


(5) Anemia


Plan


Plan:





HD 11/27 and again 11/29


BP control


2D Echo Left ventricular ejection fraction estimated to be  50-55 %.


cultures


Per orders





Subjective


ROS Limited/Unobtainable:  No


Constitutional:  Reports: weakness





Objective


Objective





Last 24 Hour Vital Signs








  Date Time  Temp Pulse Resp B/P (MAP) Pulse Ox O2 Delivery O2 Flow Rate FiO2


 


11/28/17 13:05    147/67    


 


11/28/17 13:00  63 19 139/71 99 Room Air  


 


11/28/17 12:00  65      


 


11/28/17 12:00 98.2 66 18 125/63 96 Room Air  


 


11/28/17 11:00   18 110/67 96 Room Air  


 


11/28/17 10:16  72  126/60    


 


11/28/17 10:00  67 17 124/60 97 Room Air  


 


11/28/17 09:00  66 16 113/58 98 Venturi Mask  50


 


11/28/17 08:06      Venturi Mask 12.0 50


 


11/28/17 08:06     99 Venturi Mask 12.0 50


 


11/28/17 08:00  71      


 


11/28/17 08:00 98.1 65 16 120/75 98 Venturi Mask  50


 


11/28/17 07:00  69 16 130/61 98 Venturi Mask  50


 


11/28/17 06:04    134/62    


 


11/28/17 06:00  66 14 127/66 100 Venturi Mask  50


 


11/28/17 05:00  69 16 130/61 100 Venturi Mask  50


 


11/28/17 04:00  74      


 


11/28/17 04:00 98.6 70 14 134/62 99 Venturi Mask  50


 


11/28/17 03:00  70 16 135/62 99 Venturi Mask  50


 


11/28/17 02:00  71 15 125/61 99 Venturi Mask  50


 


11/28/17 01:00 99.1 70 18 125/72 96 Venturi Mask  50


 


11/28/17 00:35    146/71    


 


11/28/17 00:00  69 18 146/71 99 Venturi Mask  50


 


11/28/17 00:00  71      


 


11/27/17 23:00  67 18 121/66 98 Venturi Mask  50


 


11/27/17 22:00  78 18 111/62 96 Venturi Mask  50


 


11/27/17 21:00  70 18 117/60 97 Venturi Mask  50


 


11/27/17 20:48  69  127/53    


 


11/27/17 20:00 99.3 75 16 122/56 98 Venturi Mask  50


 


11/27/17 20:00  80      


 


11/27/17 19:30     96 Venturi Mask 12.0 50


 


11/27/17 19:30      Venturi Mask 12.0 50


 


11/27/17 19:00  69 20 124/53 97 Venturi Mask  50


 


11/27/17 18:00  67 16 122/55 98 Venturi Mask  50


 


11/27/17 17:15    145/60    


 


11/27/17 17:00  71 20 136/64 100 Venturi Mask  50


 


11/27/17 16:00  78      


 


11/27/17 16:00 99.2 72 16 124/52 96 Venturi Mask  50


 


11/27/17 15:00  85 22 132/55 94 Venturi Mask  50


 


11/27/17 14:52      Venturi Mask 13.0 50


 


11/27/17 14:00  69 19 128/55 100 Venturi Mask  50


 


11/27/17 13:50 97.8 73 16 125/54  Venturi Mask  


 


11/27/17 13:50      Venturi Mask 13.0 

















Intake and Output  


 


 11/28/17 11/29/17





 19:00 07:00


 


Intake Total 170 ml 


 


Output Total 1 ml 


 


Balance 169 ml 


 


  


 


Intake Oral 170 ml 


 


Output Stool Total 1 ml 


 


# Voids 2 


 


# Bowel Movements 2 








Laboratory Tests


11/27/17 15:15: Troponin I 0.090H


11/28/17 04:30: 


Troponin I 0.057H, White Blood Count 8.9, Red Blood Count 2.68L, Hemoglobin 8.5L

, Hematocrit 25.2L, Mean Corpuscular Volume 94, Mean Corpuscular Hemoglobin 

31.5H, Mean Corpuscular Hemoglobin Concent 33.6, Red Cell Distribution Width 

13.1, Platelet Count 106L, Mean Platelet Volume 9.0, Neutrophils (%) (Auto) 

80.2H, Lymphocytes (%) (Auto) 11.6L, Monocytes (%) (Auto) 5.6, Eosinophils (%) (

Auto) 2.2, Basophils (%) (Auto) 0.5, Sodium Level 142, Potassium Level 3.9, 

Chloride Level 99, Carbon Dioxide Level 35H, Anion Gap 8, Blood Urea Nitrogen 

32H, Creatinine 6.0H, Estimat Glomerular Filtration Rate 7.7, Glucose Level 99, 

Calcium Level 8.6, Phosphorus Level 2.8, Magnesium Level 2.0, Pro-B-Type 

Natriuretic Peptide [id number]


Height (Feet):  5


Height (Inches):  2.00


Weight (Pounds):  113


General Appearance:  no apparent distress


Cardiovascular:  normal rate


Respiratory/Chest:  decreased breath sounds


Abdomen:  soft











JOSTIN JACKSON Nov 28, 2017 13:31 dextrose, glucagon (rDNA), dextrose, perflutren lipid microspheres, acetaminophen **OR** acetaminophen, heparin (porcine), heparin (porcine), glucose, dextrose, glucagon (rDNA), dextrose, sodium chloride flush, sodium chloride, ondansetron **OR** ondansetron, polyethylene glycol    Vital/I&O/Physical examination:   VS:  /82   Pulse 97   Temp 98.8 °F (37.1 °C) (Bladder)   Resp (!) 44   Ht 5' 6\" (1.676 m)   Wt 234 lb 12.6 oz (106.5 kg)   SpO2 (!) 88%   BMI 37.90 kg/m²     I/O:    Intake/Output Summary (Last 24 hours) at 11/8/2021 0818  Last data filed at 11/8/2021 0600  Gross per 24 hour   Intake 24000.87 ml   Output 1506 ml   Net 29487.87 ml     Review of Systems   Unable to perform ROS: Intubated       PE:  Physical Exam  Constitutional:       Interventions: He is sedated and intubated. Comments: Sedated   Cardiovascular:      Rate and Rhythm: Regular rhythm. Tachycardia present. Pulses: Normal pulses. Heart sounds: Normal heart sounds. Pulmonary:      Effort: He is intubated. Breath sounds: Rales present. No wheezing. Comments: Mechanical breath sounds  Abdominal:      General: Bowel sounds are normal. There is no distension. Palpations: Abdomen is soft. Musculoskeletal:         General: No swelling. Right lower leg: No edema. Left lower leg: No edema.          Labs & Imaging:   LABS:  Renal:   Recent Labs     11/07/21  2240 11/08/21 0128 11/08/21  0440    142 140   K 6.2* 6.1* 5.9*    99 97*   CO2 20* 19* 17*   * 146* 145*   CREATININE 4.9* 5.1* 5.2*   GLUCOSE 169* 195* 209*   ANIONGAP 21* 24* 26*     CBC:   Recent Labs     11/06/21 0445 11/07/21 0424 11/08/21  0440   WBC 21.2* 16.8* 39.5*   HGB 10.4* 8.8* 10.4*   HCT 31.7* 27.4* 32.6*    203 193   MCV 91.8 91.7 93.4                            Hepatic:   Recent Labs     11/07/21 0424 11/08/21  0128 11/08/21  0440   AST 49* >7,000* >7,000*   * 6,823* >7,000*   BILITOT 0.7 2. 5* 2.6*   ALKPHOS 118 158* 166*     Troponin: No results for input(s): TROPONINI in the last 72 hours. BNP: No results for input(s): BNP in the last 72 hours. Lipids: No results for input(s): CHOL, HDL in the last 72 hours. Invalid input(s): LDLCALCU, TRIGLYCERIDE  INR:   Recent Labs     11/08/21  0200   INR 1.72*     Lactate:   Recent Labs     11/07/21  1226   LACTATE 1.44     ABGs:  Recent Labs     11/07/21  0424 11/07/21  1226 11/08/21  0444   PHART 7.375 7.324* 7.143*   FSA1AZV 58.3* 59.3* 57.4*   PO2ART 60.8* 56.5* 71.1*   GCW2RBK 34* 30.9* 20*   BEART 8.2* 5* -8.9*   L3WSYTIJ 93 86* 89*   SJT3JFF 36 33 21       UA:  Recent Labs     11/07/21  1100   COLORU Yellow   PHUR 6.0   WBCUA 3-5   RBCUA >100*   BACTERIA Rare*   CLARITYU CLOUDY*   SPECGRAV 1.020   LEUKOCYTESUR SMALL*   UROBILINOGEN 0.2   BILIRUBINUR Negative   BLOODU LARGE*   GLUCOSEU Negative        IMAGING:  XR CHEST PORTABLE   Final Result   Impression: No significant interval change. XR CHEST PORTABLE   Final Result   1. No interval changes      XR CHEST PORTABLE   Final Result   1. Stable chest with diffuse extensive pulmonary groundglass airspace disease   2. No interval development of pneumothorax or pneumomediastinum      XR CHEST PORTABLE   Final Result   1. Stable chest      XR CHEST PORTABLE   Final Result   Impression: No significant interval change. Stable support devices. XR CHEST PORTABLE   Final Result      Extensive diffuse coarse consolidation mildly progressive since 11/3/2021. Stable cardiomediastinal silhouette. Lines and tubes without change. No evidence of pneumothorax. Subtle linear lucency adjacent to the right and left heart borders may represent pneumomediastinum. XR CHEST PORTABLE   Final Result      Lines and tubes unchanged. Diffuse airspace disease similar to prior study.             XR CHEST PORTABLE   Final Result   Impression: Stable appearance of the chest. No discrete pneumothorax. XR CHEST PORTABLE   Final Result   1. No interval change   2. No evidence of pneumothorax             XR CHEST PORTABLE   Final Result      1. ET tube about 3 cm above the dayna. 2.  Stable small left apical pneumothorax. 3.  Stable appearance of diffuse   4. Stable appearance of diffuse new mediastinal and thoracic inlet soft tissue gas. XR CHEST PORTABLE   Final Result      1. Small left apical pneumothorax. Pneumomediastinum. Soft tissue gas in the neck and chest wall. Findings suggestive of barotrauma. 2.  ET tube 5 cm above the dayna. 3.  Enteric tube tip in the distal stomach. 4.  Extensive bilateral airspace disease compatible with severe COVID-19 pneumonia and/or ARDS. Critical results reported to ICU nurseJayshree, 7:40 AM on 10/31/21. XR ABDOMEN (KUB) (SINGLE AP VIEW)   Final Result      1. Small left apical pneumothorax. Pneumomediastinum. Soft tissue gas in the neck and chest wall. Findings suggestive of barotrauma. 2.  ET tube 5 cm above the dayna. 3.  Enteric tube tip in the distal stomach. 4.  Extensive bilateral airspace disease compatible with severe COVID-19 pneumonia and/or ARDS. Critical results reported to ICU nurseJayshree, 7:40 AM on 10/31/21. XR CHEST PORTABLE    (Results Pending)       Assessment & Plan:      Acute hypoxic respiratory failure requiring mechanical ventilation   ARDS 2/2 Covid 19 pneumonia:Tested + 10/14, intubated 1/30    - sedated: fent, prop  - paralytic: nimbex  - cont decadron ARDS protocol  - palliative care following    Fever of unknown origin: worsening leukocytosis and fever with procal 0.41. Concern for superimposed bacterial infection vs progression of COVID sepsis  - FU cxs  - vanc/merem    Small left apical PTX: suspected 2/2 barotrauma from PAP  - daily cxr     Pulmonary embolism  - on heparin gtt     TRINIDAD, Hyperkalemia: cxn for peaked T-waves on EKG.  UOP has been decreasing despite significant volume in with uptrending Cr and BUN  - nephrology following  - vascath placement for CRRT: G/S consulted  - s/p calcium gluc + insulin/D50  - lokelma 10g daily x3    Hypernatremia: resolved  - free water in TFs  - TFs held for vascath placement    Code Status: Full Code  FEN: Diet NPO  PPX:heparin gtt, pepcid  DISPO: ICU    This patient will be discussed with attending, Dr. Emilio Winter MD PGY- 1  Contact via [employer]  11/8/2021,  8:18 AM     Patient seen, examined and discussed with the resident and I agree with the assessment and plan. Briefly, this is a 45 y.o. male with acute hypoxemic respiratory failure 2/2 covid 19 pneumonia complicated by pneumomediastinum, TRINIDAD and shock    Vent Mode: PRVC Rate Set: 44 bmp/Vt Ordered: 425 mL/ /FiO2 : 100 %  PEEP 8  Recent Labs     11/07/21  1226 11/08/21  0444   PHART 7.324* 7.143*   ABC8MNY 59.3* 57.4*   PO2ART 56.5* 71.1*     DIfficult to oxygenate and ventilate. Worsening metabolic and respiratory acidosis. Vas cath placed today to initiate CRRT. Markedly elevated transaminases and febrile to 104.2. Refused Toci and baricitinib at the previous hospital.  On vanc and meropenem for high fevers and increased wbc started 11/7. Cultures are pending. Patient mortality is approaching 100% based on clinical picture today. If bp can't tolerate dialysis death could be imminent. Family at bedside and updated. Palliative care consulted. Critical care time spent reviewing labs/films, examining patient, collaborating with other physicians but excluding procedures for life threatening organ failure is 42 minutes.       Radha Aranda MD

## 2021-11-08 NOTE — CARE COORDINATION
Case Management Assessment           Daily Note                 Date/ Time of Note: 11/8/2021 11:24 AM         Note completed by: Jacqueline Umanzor RN    Patient Name: Tania Valerio  YOB: 1970    Diagnosis:Pneumonia due to COVID-19 virus [U07.1, J12.82]  Patient Admission Status: Inpatient    Date of Admission:10/31/2021  5:37 AM Length of Stay: 8 GLOS: GMLOS: 4.8    Current Plan of Care: starting CRRT, remains intubated and sedated/requires pressor support  ________________________________________________________________________________________  PT AM-PAC:   / 24 per last evaluation on: TBD    OT AM-PAC:   / 24 per last evaluation on: TBD    DME Needs for discharge: TBD  ________________________________________________________________________________________  Discharge Plan: To Be Determined DUE TO: independent prior-starting CRRT today    Tentative discharge date: TBD    Current barriers to discharge: not medically ready    Referrals completed: Not Applicable    Resources/ information provided: Not indicated at this time  ________________________________________________________________________________________  Case Management Notes:Patient is from home with wife and independent prior. Remains intubated and sedated. Starting CRRT today. Gi Damon and his family were provided with choice of provider; he and his family are in agreement with the discharge plan.     Care Transition Patient: Kari Umanzor RN  [facility]  Case Management Department  Ph: [phone]  Fax: [phone]

## 2021-11-08 NOTE — PROGRESS NOTES
Nephrology Progress Note    Admit Date: 10/31/2021  Diet: Diet NPO  ADULT TUBE FEEDING; Nasogastric; Renal Formula; Continuous; 10; No; 250; Q 4 hours    Chief Complaint: Shortness of breath    Interval history:   Creatinine stable   Good UO   K better on Lokalema       Medications:     Scheduled Meds:   sodium zirconium cyclosilicate  10 g Oral Daily    meropenem  500 mg IntraVENous Q12H    vancomycin (VANCOCIN) intermittent dosing (placeholder)   Other See Admin Instructions    insulin glargine  15 Units SubCUTAneous Nightly    senna  5 mL Oral Nightly    polyethylene glycol  17 g Oral BID    famotidine  20 mg Per NG tube Daily    sodium chloride flush  5-40 mL IntraVENous 2 times per day    dexamethasone  10 mg IntraVENous Q24H    insulin lispro  0-18 Units SubCUTAneous Q6H     Continuous Infusions:   phenylephrine (GAUTAM-SYNEPHRINE) 50mg/250mL infusion 300 mcg/min (11/07/21 2112)    norepinephrine 4 mcg/min (11/07/21 2251)    cisatracurium (NIMBEX) infusion 2 mcg/kg/min (11/07/21 2000)    heparin (PORCINE) Infusion 13 Units/kg/hr (11/07/21 2000)    dextrose      propofol 50 mcg/kg/min (11/07/21 2314)    fentaNYL 200 mcg/hr (11/07/21 2055)    sodium chloride       PRN Meds:perflutren lipid microspheres, acetaminophen **OR** acetaminophen, heparin (porcine), heparin (porcine), glucose, dextrose, glucagon (rDNA), dextrose, sodium chloride flush, sodium chloride, ondansetron **OR** ondansetron, polyethylene glycol    Objective:   Vitals:   T-max:  Patient Vitals for the past 8 hrs:   BP Temp Temp src Pulse Resp SpO2   11/07/21 2228 92/71 103.8 °F (39.9 °C) Bladder 112 (!) 44    11/07/21 2200 94/71 102.2 °F (39 °C) Bladder 112 (!) 44 (!) 89 %   11/07/21 2100 94/66   110 (!) 44 90 %   11/07/21 2057 94/66 101.9 °F (38.8 °C) Axillary 111 (!) 44    11/07/21 2001    110 (!) 44 91 %   11/07/21 2000 95/67 102.8 °F (39.3 °C) Axillary 110 (!) 44 91 %   11/07/21 1900 93/65   111 (!) 44    11/07/21 1815 92/65   114 (!) 44 90 %   11/07/21 1800 91/66 100.6 °F (38.1 °C) Axillary 114 (!) 44 90 %   11/07/21 1745 88/67   116 (!) 44 (!) 89 %   11/07/21 1730 93/69   117 (!) 44 90 %   11/07/21 1715 82/62   117 (!) 44 91 %   11/07/21 1714    117 (!) 44 91 %   11/07/21 1700 (!) 73/61   117 (!) 44 91 %   11/07/21 1657 (!) 73/53   117 (!) 44 92 %   11/07/21 1645 (!) 76/56   120 (!) 44 90 %   11/07/21 1634     (!) 44 90 %   11/07/21 1630    122 (!) 44 90 %   11/07/21 1615    125 (!) 44 (!) 89 %       Intake/Output Summary (Last 24 hours) at 11/8/2021 0003  Last data filed at 11/7/2021 2200  Gross per 24 hour   Intake 5963.31 ml   Output 2700 ml   Net 3263.31 ml       Review of Systems   Unable to perform ROS: Intubated       Physical Exam  Vitals reviewed. Constitutional:       General: He is not in acute distress. Appearance: He is well-developed and well-groomed. HENT:      Head: Normocephalic and atraumatic. Mouth/Throat:      Mouth: Mucous membranes are moist.      Pharynx: Oropharynx is clear. Eyes:      General: No scleral icterus. Extraocular Movements: Extraocular movements intact. Conjunctiva/sclera: Conjunctivae normal.      Pupils: Pupils are equal, round, and reactive to light. Cardiovascular:      Rate and Rhythm: Normal rate and regular rhythm. Pulses: Normal pulses. Heart sounds: Normal heart sounds, S1 normal and S2 normal. No murmur heard. Pulmonary:      Effort: Pulmonary effort is normal. No respiratory distress. Breath sounds: Normal breath sounds and air entry. Abdominal:      General: Abdomen is flat. Bowel sounds are normal.      Palpations: Abdomen is soft. Tenderness: There is no abdominal tenderness. Musculoskeletal:         General: Normal range of motion. Cervical back: Full passive range of motion without pain, normal range of motion and neck supple. Skin:     General: Skin is warm and dry.    Neurological: General: No focal deficit present. Mental Status: He is alert and oriented to person, place, and time. Cranial Nerves: Cranial nerves are intact. Sensory: Sensation is intact. Motor: Motor function is intact. Coordination: Coordination is intact. Gait: Gait is intact. Deep Tendon Reflexes: Reflexes are normal and symmetric. Psychiatric:         Attention and Perception: Attention and perception normal.         Mood and Affect: Mood normal.         Speech: Speech normal.         Behavior: Behavior normal. Behavior is cooperative. Thought Content: Thought content normal.         Cognition and Memory: Cognition and memory normal.         Judgment: Judgment normal.         LABS:    CBC:   Recent Labs     11/05/21  0505 11/06/21  0445 11/07/21  0424   WBC 17.4* 21.2* 16.8*   HGB 10.0* 10.4* 8.8*   HCT 30.8* 31.7* 27.4*    230 203   MCV 91.0 91.8 91.7     Renal:    Recent Labs     11/07/21  0424 11/07/21  1510 11/07/21  2240   * 149* 142   K 5.1 5.3* 6.2*    108 101   CO2 29 27 20*   * 138* 142*   CREATININE 4.0* 4.2* 4.9*   GLUCOSE 105* 157* 169*   CALCIUM 8.4 8.4 8.1*   PHOS 6.0* 6.5* 10.2*   ANIONGAP 11 14 21*     Hepatic:   Recent Labs     11/05/21  0505 11/05/21  1745 11/06/21  0445 11/06/21  1705 11/07/21  0424 11/07/21  1510 11/07/21  2240   AST 26  --  32  --  49*  --   --    ALT 80*  --  96*  --  102*  --   --    BILITOT 0.7  --  0.9  --  0.7  --   --    BILIDIR 0.6*  --  0.7*  --  0.5*  --   --    PROT 5.5*  --  6.0*  --  5.4*  --   --    LABALBU 2.6*   < > 2.8*   < > 2.5* 2.6* 2.6*   ALKPHOS 129  --  133*  --  118  --   --     < > = values in this interval not displayed. Troponin: No results for input(s): TROPONINI in the last 72 hours. BNP: No results for input(s): BNP in the last 72 hours. Lipids: No results for input(s): CHOL, HDL in the last 72 hours.     Invalid input(s): LDLCALCU, TRIGLYCERIDE  ABGs:    Recent Labs 11/06/21 2015 11/07/21  0424 11/07/21  1226   PHART 7.239* 7.375 7.324*   PIH4HEU 78.0* 58.3* 59.3*   PO2ART 75.8 60.8* 56.5*   HGA8POP 33* 34* 30.9*   BEART 4.3* 8.2* 5*   Y1NTRFPW 94 93 86*   VRV7FTE 36 36 33       INR: No results for input(s): INR in the last 72 hours. Lactate:   Recent Labs     11/07/21  1226   LACTATE 1.44     Cultures:  -----------------------------------------------------------------  RAD:   XR CHEST PORTABLE   Final Result   1. No interval changes      XR CHEST PORTABLE   Final Result   1. Stable chest with diffuse extensive pulmonary groundglass airspace disease   2. No interval development of pneumothorax or pneumomediastinum      XR CHEST PORTABLE   Final Result   1. Stable chest      XR CHEST PORTABLE   Final Result   Impression: No significant interval change. Stable support devices. XR CHEST PORTABLE   Final Result      Extensive diffuse coarse consolidation mildly progressive since 11/3/2021. Stable cardiomediastinal silhouette. Lines and tubes without change. No evidence of pneumothorax. Subtle linear lucency adjacent to the right and left heart borders may represent pneumomediastinum. XR CHEST PORTABLE   Final Result      Lines and tubes unchanged. Diffuse airspace disease similar to prior study. XR CHEST PORTABLE   Final Result   Impression: Stable appearance of the chest. No discrete pneumothorax. XR CHEST PORTABLE   Final Result   1. No interval change   2. No evidence of pneumothorax             XR CHEST PORTABLE   Final Result      1. ET tube about 3 cm above the dayna. 2.  Stable small left apical pneumothorax. 3.  Stable appearance of diffuse   4. Stable appearance of diffuse new mediastinal and thoracic inlet soft tissue gas. XR CHEST PORTABLE   Final Result      1. Small left apical pneumothorax. Pneumomediastinum. Soft tissue gas in the neck and chest wall. Findings suggestive of barotrauma.    2.  ET tube 5 cm above the dayna. 3.  Enteric tube tip in the distal stomach. 4.  Extensive bilateral airspace disease compatible with severe COVID-19 pneumonia and/or ARDS. Critical results reported to ICU nurseJayshree, 7:40 AM on 10/31/21. XR ABDOMEN (KUB) (SINGLE AP VIEW)   Final Result      1. Small left apical pneumothorax. Pneumomediastinum. Soft tissue gas in the neck and chest wall. Findings suggestive of barotrauma. 2.  ET tube 5 cm above the dayna. 3.  Enteric tube tip in the distal stomach. 4.  Extensive bilateral airspace disease compatible with severe COVID-19 pneumonia and/or ARDS. Critical results reported to ICU nurseJayshree, 7:40 AM on 10/31/21. XR CHEST PORTABLE    (Results Pending)   XR CHEST PORTABLE    (Results Pending)       Assessment/Plan:      Acute kidney injury  · Maintain blood pressure to keep map over 65  · Avoid nephrotoxic agents  · Strict I's and O  · Daily renal panel  · May need RRT if renal function worsens        Hyperkalemia  Patient has a potassium of 6.5 this morning. Received IV insulin, etc  · Patient receiving  [place] Stage  · Continue to trend Renal Panel     Acute hypoxic respiratory failure secondary to severe ARDS. Patient has COVID-19 pneumonia  Intubated, sedated and paralyzed in the ICU.   · Continue management per ICU team     Sepsis secondary to COVID-19 pneumonia and bacteremia  Patient is COVID-19 pneumonia and a secondary bacterial infection  · On antibiotics and treatment per primary team      D/w ICU team       Juliocesar Malave MD

## 2021-11-08 NOTE — PROGRESS NOTES
chloride, ondansetron **OR** ondansetron, polyethylene glycol    Objective:   Vitals:   T-max:  Patient Vitals for the past 8 hrs:   BP Temp Temp src Pulse Resp SpO2 Height Weight   11/08/21 0600 112/82   97 (!) 44 (!) 88 %     11/08/21 0500 104/60 98.8 °F (37.1 °C) Bladder 97 (!) 44 92 % 5' 6\" (1.676 m) 234 lb 12.6 oz (106.5 kg)   11/08/21 0401    102 (!) 32 90 %     11/08/21 0400 (!) 138/95 99.3 °F (37.4 °C) Bladder 102 (!) 44      11/08/21 0300 (!) 135/90 100.2 °F (37.9 °C) Bladder 105 (!) 44 (!) 89 %     11/08/21 0200 111/75 100.8 °F (38.2 °C) Bladder 105 (!) 44 (!) 88 %     11/08/21 0154  101.7 °F (38.7 °C)             Intake/Output Summary (Last 24 hours) at 11/8/2021 0901  Last data filed at 11/8/2021 0600  Gross per 24 hour   Intake 37037.87 ml   Output 806 ml   Net 04972.87 ml       Review of Systems   Unable to perform ROS: Intubated       Physical Exam  Vitals reviewed. Constitutional:       General: He is not in acute distress. Appearance: He is well-developed and well-groomed. He is ill-appearing. HENT:      Head: Normocephalic and atraumatic. Mouth/Throat:      Mouth: Mucous membranes are moist.      Pharynx: Oropharynx is clear. Eyes:      General: No scleral icterus. Extraocular Movements: Extraocular movements intact. Conjunctiva/sclera: Conjunctivae normal.      Pupils: Pupils are equal, round, and reactive to light. Cardiovascular:      Rate and Rhythm: Normal rate and regular rhythm. Pulses: Normal pulses. Heart sounds: Normal heart sounds, S1 normal and S2 normal. No murmur heard. Pulmonary:      Effort: Pulmonary effort is normal. No respiratory distress. Breath sounds: Normal breath sounds and air entry. Comments: Intubated and mechanically ventilated   Abdominal:      General: Abdomen is flat. Bowel sounds are normal.      Palpations: Abdomen is soft. Tenderness: There is no abdominal tenderness.    Musculoskeletal: for input(s): TROPONINI in the last 72 hours. BNP: No results for input(s): BNP in the last 72 hours. Lipids: No results for input(s): CHOL, HDL in the last 72 hours. Invalid input(s): LDLCALCU, TRIGLYCERIDE  ABGs:    Recent Labs     11/07/21  0424 11/07/21  1226 11/08/21  0444   PHART 7.375 7.324* 7.143*   ETR2PYX 58.3* 59.3* 57.4*   PO2ART 60.8* 56.5* 71.1*   LDE4GIJ 34* 30.9* 20*   BEART 8.2* 5* -8.9*   Y8SZICVE 93 86* 89*   NIC1CRU 36 33 21       INR:   Recent Labs     11/08/21  0200   INR 1.72*     Lactate:   Recent Labs     11/07/21  1226   LACTATE 1.44     Cultures:  -----------------------------------------------------------------  RAD:   XR CHEST PORTABLE   Final Result   Impression: No significant interval change. XR CHEST PORTABLE   Final Result   1. No interval changes      XR CHEST PORTABLE   Final Result   1. Stable chest with diffuse extensive pulmonary groundglass airspace disease   2. No interval development of pneumothorax or pneumomediastinum      XR CHEST PORTABLE   Final Result   1. Stable chest      XR CHEST PORTABLE   Final Result   Impression: No significant interval change. Stable support devices. XR CHEST PORTABLE   Final Result      Extensive diffuse coarse consolidation mildly progressive since 11/3/2021. Stable cardiomediastinal silhouette. Lines and tubes without change. No evidence of pneumothorax. Subtle linear lucency adjacent to the right and left heart borders may represent pneumomediastinum. XR CHEST PORTABLE   Final Result      Lines and tubes unchanged. Diffuse airspace disease similar to prior study. XR CHEST PORTABLE   Final Result   Impression: Stable appearance of the chest. No discrete pneumothorax. XR CHEST PORTABLE   Final Result   1. No interval change   2. No evidence of pneumothorax             XR CHEST PORTABLE   Final Result      1. ET tube about 3 cm above the dayna.    2.  Stable small left apical pneumothorax. 3.  Stable appearance of diffuse   4. Stable appearance of diffuse new mediastinal and thoracic inlet soft tissue gas. XR CHEST PORTABLE   Final Result      1. Small left apical pneumothorax. Pneumomediastinum. Soft tissue gas in the neck and chest wall. Findings suggestive of barotrauma. 2.  ET tube 5 cm above the dayna. 3.  Enteric tube tip in the distal stomach. 4.  Extensive bilateral airspace disease compatible with severe COVID-19 pneumonia and/or ARDS. Critical results reported to ICU nurse, Zulema Funes, 7:40 AM on 10/31/21. XR ABDOMEN (KUB) (SINGLE AP VIEW)   Final Result      1. Small left apical pneumothorax. Pneumomediastinum. Soft tissue gas in the neck and chest wall. Findings suggestive of barotrauma. 2.  ET tube 5 cm above the dayna. 3.  Enteric tube tip in the distal stomach. 4.  Extensive bilateral airspace disease compatible with severe COVID-19 pneumonia and/or ARDS. Critical results reported to ICU nurse, Zulema Funes, 7:40 AM on 10/31/21. XR CHEST PORTABLE    (Results Pending)       Assessment/Plan:     Acute kidney failure  Acute kidney failure secondary to sepsis and his COVID-19 pneumonia  · Maintain blood pressure to keep map over 65  · Currently on 2 pressers  · Avoid nephrotoxic agents  · Strict I's and O  · Daily renal panel  · Initiate CRRT as patient's electrolytes have worsened and urine output has stopped      Hyperkalemia  Patient has a potassium of 5.9 today  · Patient being started on CRRT  · Continue to trend Renal Panel     Acute hypoxic respiratory failure secondary to severe ARDS. Patient has COVID-19 pneumonia  Intubated, sedated and paralyzed in the ICU.   · Continue management per ICU team     Sepsis secondary to COVID-19 pneumonia and bacteremia  Patient is COVID-19 pneumonia and a secondary bacterial infection  · On antibiotics and treatment per primary team    Code Status: Full  DISPO: General Medical Floor    This patient has been staffed and discussed with Dr. Cowart Favorite  -----------------------------  Lorene Mejias MD, PGY-3  11/8/2021  9:01 AM      Doing poorly   Start CRRT today   Low K bags   Monitor Phos   Monitor lactate     D/w Family at bedside  D/w ICU team     Too Kyle MD

## 2021-11-08 NOTE — PROGRESS NOTES
Clinical Pharmacy Progress Note    Vancomycin - Management by Pharmacy    Consult Date(s): 11/7  Consulting Provider(s): Dr. Harrison Spring / Plan  Bloodstream Infection - Vancomycin   Concurrent Antimicrobials: Meropenem - day #2   Day of Vanc Therapy: #9   Current Dosing Method: Intermittent Dosing by Levels   Therapeutic Goal: ~ 15 mg/L   Current Dose / Frequency: Intermittent Dosing   Plan / Rationale:   o Patient received vancomycin 1g IV x 1 dose yesterday evening. No additional doses of vancomycin needed today. o Plan to start CRRT today. Will obtain a random vancomycin level tomorrow AM.    Jaleesa Jacobs Will continue to monitor clinical condition and make adjustments to regimen as appropriate. Thank you for consulting Pharmacy! Mau Car PharmD, [facility]  Wireless: [phone]  11/8/2021 10:34 AM      Interval update: Tmax 104.2F over the past 24 hrs with acute increase in leukocytosis to 39.5. Plan to start CRRT today. Subjective/Objective: Mr. Muna Rojas is a 45 y.o. male  with no charted PMHx at this time. Patient started with COVID sx on 10/16, and diagnosed with +COVID test on 10/21. Admitted to OSH (in [place]) on 10/26 with SOB requiring supplemental O2; placed on dexamethasone, doxycycline and CTX per notes for PNA. Found to have acute PE. Respiratory function worsened,and pt was intubated 10/30 and transferred to [facility]. Blood cx + with Staph and respiratory cx with GPC. Received 6 days of vancomycin before DC 11/5. Antibiotics have now been re-started due to new onset fever and leukocytosis. Pharmacy has been consulted to dose vancomycin.     Height:   Ht Readings from Last 1 Encounters:   11/08/21 5' 6\" (1.676 m)     Weight:   Wt Readings from Last 1 Encounters:   11/08/21 234 lb 12.6 oz (106.5 kg)     Current & Prior Antimicrobial Regimen(s):   Meropenem (11/7-current)    Vancomycin (10/31-current)     Level(s) / Doses:  Date Time Dose Level / Type of Level Interpretation   10/31 05:55   Random = 23.6 mcg/ml · Drawn upon admission to [facility]. Per ICU Rn, no MAR from OSH sent. · Unclear prior vanc dosing. 10/31 12:13   Random = 16.2 mcg/mL · Drawn ~6 hrs after last random level. · Calculated half-life ~12 hrs. 11/2 03:37 1g IV q12h -> intermittent dosing d/t TRINIDAD Random = 20.4 mcg/mL · Obtained ~25 hrs after the last dose  · Continue to hold dose   11/3 04:20 --- Random = 17.5 mcg/mL · Calculated half-life ~113 hrs  · Re-dose with 1g IV x 1   11/4   --- --- · Continue to hold dose   11/5 05:05 1g IV x 1 on 11/3 at 08:12 Random = 15.4 mcg/mL · Drawn ~45 hrs after previous dose  · Re-dose with 1g IV x 1   11/7 16:50 1 g IV x1 on 11/5 at 08:23 Random = 13.1 mcg/mL · Drawn ~56 hrs after previous dose  · Re-dose with 1g IV x1   Note: Serum levels collected for AUC-based dosing may be high if collected in close proximity to the dose administered. This is not necessarily an indicator of toxicity.     Cultures & Sensitivities:    Date Site Micro Susceptibility / Result   11/2 Blood No growth    11/7 Blood x2 In process    11/7 Trach asp Ordered      Labs / Ancillary Data:    Estimated Creatinine Clearance: 19 mL/min (A) (based on SCr of 5.2 mg/dL [facility])). Recent Labs     11/06/21  0445 11/06/21  1705 11/07/21  0424 11/07/21  1510 11/07/21  2240 11/08/21  0128 11/08/21  0440   CREATININE 3.6*   < > 4.0*   < > 4.9* 5.1* 5.2*   *   < > 138*   < > 142* 146* 145*   WBC 21.2*  --  16.8*  --   --   --  39.5*    < > = values in this interval not displayed.

## 2021-11-08 NOTE — CONSULTS
[facility]  Palliative Medicine Consultation Note      Date Of Admission:10/31/2021  Date of consult: 11/08/21  Seen by [facility] in the past:  No    Recommendations:        Met with pt's wife Kendra at the bedside, introduced palliative care. Discussed her understanding of the pt's condition. She understands that the pt is gravely ill and that he may pass away from his current illness. She has updated his family, they live three hours away in [place]. Discussed his code status in light of his current condition. She reports at this point he would not want resuscitation if his heart were to stop. Explained [facility], to which she is agreeable. Provided emotional support. 1. Goals of Care/Advanced Care planning/Code status: [facility], continue with current management. Pt's family is hopeful for recovery but they understand that this is not likely. Will continue to follow for goals of care discussions. 2. Pain: Pt is sedated  3. SOB 2/2 COVID19: Mechanically ventilated, intubated 10/30. Currently requiring Nimbex. 4. Disposition: TBD pending hospital course    Reason for Consult:         [x]  Goals of Care  [x]  Code Status Discussion/Advanced Care Planning   []  Psychosocial/Family Support  []  Symptom Management  []  Other (Specify)    Requesting Physician: Dr. Link Holstein:  SOB    History Obtained From:  electronic medical record    History of Present Illness: Christiano Gavin is a 45 y.o. male with no documented PMH who presented with SOB on 10/25 to [facility] ER. He tested positive for COVID19, symptoms started on 10/16. He was started on abx, dexamethasone and inhaler at that time. Patient underwent a CTA and was found to have left lower pulmonary artery defects/emboli and started on therapeutic dose of lovenox. On 10/28 patient developed tachycardia and abx were added, doxycycline and rocephin. Patient given fluids per sepsis recommendations.  Blood and sputum cultures collected, preliminary on cultures showed gram positive cocci with final culture/sensitivies pending. Overnight, patient's respiratory status worsened and he required CPAP. He did not tolerate high flow O2 therapy. On CPAP patient had panic and anxiety with respirations into the 30s but responded well to ativan. From 10/29-10/30 patient continued to worsen and was attempted to be proned. It was determined on 10/30 patient required intubation. He was transferred to [facility] for further management. He now has a new pressor requirement, on levo and juan. He now has worsening metabolic and respiratory acidosis. A vascath was placed in order to initiate CRRT. Subjective:         Past Medical History:        Diagnosis Date    History of 2019 novel coronavirus disease (COVID-19) 10/14/2021       Past Surgical History:    No past surgical history on file. Current Medications:    No medications prior to admission. Allergies:  Shellfish-derived products    Social History:    · TOBACCO: has no history on file for tobacco use. · ETOH:   has no history on file for alcohol use. · Patient currently lives with family wife    Review of Systems -   Review of Systems   Unable to perform ROS: Intubated        Objective:          Physical Exam  Constitutional:       Appearance: He is ill-appearing. Cardiovascular:      Rate and Rhythm: Normal rate and regular rhythm. Heart sounds: Normal heart sounds. Pulmonary:      Comments: Mechanically ventilated  Abdominal:      General: Bowel sounds are normal.      Palpations: Abdomen is soft. Musculoskeletal:      Right lower leg: Edema present. Left lower leg: Edema present. Skin:     General: Skin is warm and dry.    Neurological:      Comments: Sedated, paralyzed, GABRIELA          Palliative Performance Scale:  [] 60% Ambulation reduced; Significant disease; Can't do hobbies/housework; intake normal or reduced; occasional assist; LOC full/confusion  [] 50% Mainly sit/lie; Extensive disease; Can't do any work; Considerable assist; intake normal  Or reduced; LOC full/confusion  [] 40% Mainly in bed; Extensive disease; Mainly assist; intake normal or reduced; occasional assist; LOC full/confusion  [x] 30% Bed Bound; Extensive disease; Total care; intake reduced; LOC full/confusion  [] 20% Bed Bound; Extensive disease; Total care; intake minimal; Drowsy/coma  [] 10% Bed Bound; Extensive disease; Total care; Mouth care only; Drowsy/coma  [] 0% Death    PPS: 30    Vitals:    /82   Pulse 97   Temp 98.8 °F (37.1 °C) (Bladder)   Resp (!) 44   Ht 5' 6\" (1.676 m)   Wt 234 lb 12.6 oz (106.5 kg)   SpO2 (!) 88%   BMI 37.90 kg/m²     Labs:    BMP:   Recent Labs     11/07/21  2240 11/08/21 0128 11/08/21 0440    142 140   K 6.2* 6.1* 5.9*    99 97*   CO2 20* 19* 17*   * 146* 145*   CREATININE 4.9* 5.1* 5.2*   GLUCOSE 169* 195* 209*     CBC:   Recent Labs     11/06/21  0445 11/07/21  0424 11/08/21  0440   WBC 21.2* 16.8* 39.5*   HGB 10.4* 8.8* 10.4*   HCT 31.7* 27.4* 32.6*    203 193       LFT's:   Recent Labs     11/07/21  0424 11/08/21  0128 11/08/21  0440   AST 49* >7,000* >7,000*   * 6,823* >7,000*   BILITOT 0.7 2.5* 2.6*   ALKPHOS 118 158* 166*     Troponin: No results for input(s): TROPONINI in the last 72 hours. BNP: No results for input(s): BNP in the last 72 hours.   ABGs:   Recent Labs     11/07/21  1226 11/08/21  0444   PHART 7.324* 7.143*   ERK1FKA 59.3* 57.4*   PO2ART 56.5* 71.1*     INR:   Recent Labs     11/08/21  0200   INR 1.72*       U/A:  Recent Labs     11/07/21  1100   COLORU Yellow   PHUR 6.0   WBCUA 3-5   RBCUA >100*   BACTERIA Rare*   CLARITYU CLOUDY*   SPECGRAV 1.020   LEUKOCYTESUR SMALL*   UROBILINOGEN 0.2   BILIRUBINUR Negative   BLOODU LARGE*   GLUCOSEU Negative       XR CHEST PORTABLE   Final Result   Impression: Interval placement of a left IJ dialysis catheter terminating at the junction of the left brachiocephalic vein and superior vena cava. XR CHEST PORTABLE   Final Result   Impression: No significant interval change. XR CHEST PORTABLE   Final Result   1. No interval changes      XR CHEST PORTABLE   Final Result   1. Stable chest with diffuse extensive pulmonary groundglass airspace disease   2. No interval development of pneumothorax or pneumomediastinum      XR CHEST PORTABLE   Final Result   1. Stable chest      XR CHEST PORTABLE   Final Result   Impression: No significant interval change. Stable support devices. XR CHEST PORTABLE   Final Result      Extensive diffuse coarse consolidation mildly progressive since 11/3/2021. Stable cardiomediastinal silhouette. Lines and tubes without change. No evidence of pneumothorax. Subtle linear lucency adjacent to the right and left heart borders may represent pneumomediastinum. XR CHEST PORTABLE   Final Result      Lines and tubes unchanged. Diffuse airspace disease similar to prior study. XR CHEST PORTABLE   Final Result   Impression: Stable appearance of the chest. No discrete pneumothorax. XR CHEST PORTABLE   Final Result   1. No interval change   2. No evidence of pneumothorax             XR CHEST PORTABLE   Final Result      1. ET tube about 3 cm above the dayna. 2.  Stable small left apical pneumothorax. 3.  Stable appearance of diffuse   4. Stable appearance of diffuse new mediastinal and thoracic inlet soft tissue gas. XR CHEST PORTABLE   Final Result      1. Small left apical pneumothorax. Pneumomediastinum. Soft tissue gas in the neck and chest wall. Findings suggestive of barotrauma. 2.  ET tube 5 cm above the dayna. 3.  Enteric tube tip in the distal stomach. 4.  Extensive bilateral airspace disease compatible with severe COVID-19 pneumonia and/or ARDS. Critical results reported to ICU nurse, Himanshu Campbell, 7:40 AM on 10/31/21.             XR ABDOMEN (KUB) (SINGLE AP VIEW)   Final Result 1.  Small left apical pneumothorax. Pneumomediastinum. Soft tissue gas in the neck and chest wall. Findings suggestive of barotrauma. 2.  ET tube 5 cm above the dayna. 3.  Enteric tube tip in the distal stomach. 4.  Extensive bilateral airspace disease compatible with severe COVID-19 pneumonia and/or ARDS. Critical results reported to ICU nurse, Avis Chávez, 7:40 AM on 10/31/21. XR CHEST PORTABLE    (Results Pending)         Conclusion/Time spent:         Recommendations see above    Time spent with patient and/or family: 20  Time reviewing records: 10 min   Time communicating with staff: 5 min     A total of 35  minutes spent with the patient and family on unit greater than 50% in counseling regarding palliative care and in goals of care for the patient. Thank you to Dr. Son Wilson for this consultation. We will continue to follow Mr. Heber Covington shawn as needed.     [address] [facility]  Inpatient Palliative Care  [phone]

## 2021-11-08 NOTE — PROCEDURES
Tania Valerio is a 45 y.o. male patient. No diagnosis found. Past Medical History:   Diagnosis Date    History of 2019 novel coronavirus disease (COVID-19) 10/14/2021     Blood pressure 112/82, pulse 97, temperature 98.8 °F (37.1 °C), temperature source Bladder, resp. rate (!) 44, height 5' 6\" (1.676 m), weight 234 lb 12.6 oz (106.5 kg), SpO2 (!) 88 %. Central Line    Date/Time: 11/8/2021 9:30 AM  Performed by: Ebb Cabot, DO  Authorized by: Jordan Dinero MD   Consent: Written consent obtained. Risks and benefits: risks, benefits and alternatives were discussed  Consent given by: spouse  Procedure consent: procedure consent matches procedure scheduled  Relevant documents: relevant documents present and verified  Test results: test results available and properly labeled  Site marked: the operative site was marked  Imaging studies: imaging studies available  Required items: required blood products, implants, devices, and special equipment available  Patient identity confirmed: arm band and hospital-assigned identification number  Time out: Immediately prior to procedure a \"time out\" was called to verify the correct patient, procedure, equipment, support staff and site/side marked as required. Indications: vascular access    Sedation:  Patient sedated: yes  Sedatives: propofol  Analgesia: see MAR for details  Vitals: Vital signs were monitored during sedation.     Preparation: skin prepped with 2% chlorhexidine  Skin prep agent dried: skin prep agent completely dried prior to procedure  Sterile barriers: all five maximum sterile barriers used - cap, mask, sterile gown, sterile gloves, and large sterile sheet  Hand hygiene: hand hygiene performed prior to central venous catheter insertion  Location details: left internal jugular  Site selection rationale: R IJ CVC in place  Patient position: Trendelenburg  Catheter type: triple lumen  Catheter size: 12 Fr  Pre-procedure: landmarks identified  Ultrasound guidance: yes  Sterile ultrasound techniques: sterile gel and sterile probe covers were used  Number of attempts: 1  Successful placement: yes  Post-procedure: line sutured and dressing applied  Assessment: blood return through all ports,  free fluid flow,  placement verified by x-ray and no pneumothorax on x-ray  Patient tolerance: patient tolerated the procedure well with no immediate complications  Comments: EBL 3 cc  Wire placement confirm with ultrasound imaging (see image below)  Line placement confirmed using CXR with tip of catheter terminating in the junction between the left brachiocephalic and SVC.               Link DO Luda  11/8/2021

## 2021-11-08 NOTE — PROGRESS NOTES
Hospitalist Progress Note      PCP: GENERIC EXTERNAL DATA PROVIDER    Date of Admission: 10/31/2021    Chief Complaint on Admission: shortness of breath    Pt Seen/Examined and Chart Reviewed. Admitting dx respiratory failure due to COVID-19 pneumonia    SUBJECTIVE/OBJECTIVE:   Patient decompensated overnight, with new hypotension, pressor requirement, worsening acidosis, TRINIDAD, acute liver injury. He was started on CRRT.      Allergies  Shellfish-derived products    Medications      Scheduled Meds:   heparin (porcine)  1,000 Units IntraCATHeter Once    meropenem  1,000 mg IntraVENous Q8H    sodium zirconium cyclosilicate  10 g Oral Daily    vancomycin (VANCOCIN) intermittent dosing (placeholder)   Other See Admin Instructions    insulin glargine  15 Units SubCUTAneous Nightly    senna  5 mL Oral Nightly    polyethylene glycol  17 g Oral BID    famotidine  20 mg Per NG tube Daily    sodium chloride flush  5-40 mL IntraVENous 2 times per day    dexamethasone  10 mg IntraVENous Q24H    insulin lispro  0-18 Units SubCUTAneous Q6H       Infusions:   dialysis builder      dialysis builder      dialysis builder      dextrose      phenylephrine (GAUTAM-SYNEPHRINE) 50mg/250mL infusion 300 mcg/min (11/08/21 1425)    norepinephrine 3 mcg/min (11/08/21 0430)    cisatracurium (NIMBEX) infusion 2 mcg/kg/min (11/08/21 0010)    heparin (PORCINE) Infusion Stopped (11/08/21 0140)    propofol 50 mcg/kg/min (11/08/21 1300)    fentaNYL 200 mcg/hr (11/08/21 1039)    sodium chloride         PRN Meds:  potassium chloride, magnesium sulfate, sodium phosphate IVPB **OR** sodium phosphate IVPB **OR** sodium phosphate IVPB **OR** sodium phosphate IVPB, calcium gluconate **OR** calcium gluconate **OR** calcium gluconate **OR** calcium gluconate, glucose, dextrose, glucagon (rDNA), dextrose, heparin (porcine), acetaminophen **OR** acetaminophen, heparin (porcine), heparin (porcine), sodium chloride flush, sodium chloride, ondansetron **OR** ondansetron, polyethylene glycol    Vitals    TEMPERATURE:  Current - Temp: 97.9 °F (36.6 °C); Max - Temp  Av.1 °F (38.4 °C)  Min: 97.9 °F (36.6 °C)  Max: 104.2 °F (40.1 °C)  RESPIRATIONS RANGE: Resp  Av.3  Min: 0  Max: 44  PULSE RANGE: Pulse  Av.7  Min: 97  Max: 128  BLOOD PRESSURE RANGE:  Systolic (23GPX), LZP:123 , Min:73 , ENV:349   ; Diastolic (84HUT), MK:71, Min:53, Max:95    PULSE OXIMETRY RANGE: SpO2  Av.1 %  Min: 86 %  Max: 92 %  24HR INTAKE/OUTPUT:      Intake/Output Summary (Last 24 hours) at 2021 1554  Last data filed at 2021 1400  Gross per 24 hour   Intake 07241.24 ml   Output 256 ml   Net 80859.24 ml       Exam:      General appearance: Intubated and sedated  Lungs: Mechanical breath sounds  Heart: tachy, regular rate and rhythm with Normal B5/S9 with soft systolic murmur  Abdomen: Soft, non-tender or non-distended without rigidity or guarding and positive bowel sounds all four quadrants. Extremities: No clubbing, cyanosis, some edema present  Skin: Skin color, texture, turgor normal.    Neurologic: Intubated and sedated, on Nimbex          Data    Recent Labs     21  0440   WBC 21.2* 16.8* 39.5*   HGB 10.4* 8.8* 10.4*   HCT 31.7* 27.4* 32.6*    203 193      Recent Labs     21  2240 21  0128 21  0440    142 140   K 6.2* 6.1* 5.9*    99 97*   CO2 20* 19* 17*   PHOS 10.2* 11.1* 12.5*   * 146* 145*   CREATININE 4.9* 5.1* 5.2*     Recent Labs     215 215 21  0424 21  0128 21  0440   AST 32   < > 49* >7,000* >7,000*   ALT 96*   < > 102* 6,823* >7,000*   BILIDIR 0.7*  --  0.5*  --  2.2*   BILITOT 0.9   < > 0.7 2.5* 2.6*   ALKPHOS 133*   < > 118 158* 166*    < > = values in this interval not displayed.      Recent Labs     21  0200   INR 1.72*     Recent Labs     21  2240   CKTOTAL 179       Consults:     IP CONSULT TO CRITICAL

## 2021-11-08 NOTE — PROGRESS NOTES
Kristy placed for CRRT. Awaiting read from radiology to confirm placement. Orders for CRRT have been placed. Wife Ignacio Cadena is at the bedside.  Family updated by the ICU medical team.

## 2021-11-08 NOTE — PROGRESS NOTES
Dr Fredrick Michelle notified of patients current condition. Increase in core temperature with a decrease in patient BP. Dr. Rosa Michelle at bedside and updates given to ICU team. New orders in place. Will continue to monitor patient.

## 2021-11-09 NOTE — FLOWSHEET NOTE
11/09/21 1030   Encounter Summary   Services provided to: Patient and family together   Referral/Consult From: Nurse Edelmira Arevalo, NP Pall. Care)   Support System Family members   [facility]   ([address] [place] [address] )   Complexity of Encounter High   Length of Encounter 45 minutes   Routine   Type Follow up   Grief and Life Adjustment   Type End of life   Assessment Calm; Approachable   Intervention Active listening; Explored feelings, thoughts, concerns; Prayer; Sustaining presence/ Ministry of presence;  Discussed meaning/purpose; Discussed relationship with God; Discussed belief system/Faith practices/marcellus; Discussed illness/injury and it's impact   Outcome Comfort; Expressed gratitude; Engaged in conversation; Expressed feelings/needs/concerns   Staff , Mendel Ormond, MA

## 2021-11-09 NOTE — DISCHARGE SUMMARY
[address]   DISCHARGE SUMMARY - DEATH NOTE   TIME OF DEATH 1052, 11/9/2021    Patient ID: Nuvia Anguiano                                             Patient's PCP: Isabel Graft PROVIDER                                          Admitting Physician: Jackson Sheffield MD  Admit Date: 10/31/2021   Discharge Physician: Robbie Garsia MD MD  Discharge Date: 11/9/2021     PROBLEMS DURING HOSPITALIZATION:  Patient Active Problem List   Diagnosis    Pneumonia due to COVID-19 virus    Acute respiratory failure with hypoxia ([address]    Pneumothorax on left    Pneumomediastinum ([address]    TRINIDAD (acute kidney injury) ([address]    Electrolyte imbalance       DISCHARGE DIAGNOSES:  1- COVID pna  2- ARF  3- Septic shock with shock liver    Hospital Course: Pankaj Valdez is a 45year old male who presented to the ER in [facility] for SOB on 10/25, he was diagnosed with COVID on the previous Thursday. Symptoms started on 10/16. Patient was started on antibiotic and dexamethasone with an albuterol inhaler and was doing well until this past weekend when his SOB worsened. Patient underwent a CTA and was found to have left lower pulmonary artery defects/emboli and started on therapeutic dose of lovenox. Patient has had no history of blood clots in the past, no family history of clotting disease, no injury, no chronic medical illnesses. Patient does not smoke. Patient refused remdesivir, but was agreeable to steroids.      Patient was admitted to the hospital for COVID19 infection with hypoxia and pulmonary embolism. Patient was initially started on therapeutic lovenox and 15L non-rebreather oxygen. Patient also had RT increasing pulmonary toilet and started on 6mg dexamethasone daily. Patient was doing well for a few days and was to be switched to eliquis for discharge but on 10/28 patient developed tachycardia and abx were added, doxycycline and rocephin.  Patient given fluids per sepsis recommendations. Blood and sputum cultures collected, preliminary on cultures showed gram positive cocci with final culture/sensitivies pending. Overnight, patient's respiratory status worsened and he required CPAP. He did not tolerate high flow O2 therapy. On CPAP patient had panic and anxiety with respirations into the 30s but responded well to ativan. From 10/29-10/30 patient continued to worsen and was attempted to be proned. It was determined on 10/30 patient required intubation.      Patient was transferred to [facility] ICU for management of COVID19 infection, PE, and new onset PNA. PEEP was kept low due to pneumothorax and pneumomediastinum. Started on ARDS decadron regimen. Full dose lovenox continued for PE. Required paralysis. On vanc for staph bacteremia. Developed TRINIDAD eventually requiring CRRT and shock liver. Lovenox transitioned to heparin gtt. Pts clinical status continued to deteriorate with worsening oxygen, fevers and worsening leukocytosis, and multiorgan failure. Became progressively more acidotic from combined AGMA and respiratory acidosis. Family decided to transition to [facility] and eventually withdrew care changed to [facility].    S: Patient is unresponsive    Physical Exam:  Wt Readings from Last 3 Encounters:   21 243 lb 9.7 oz (110.5 kg)     Body mass index is 39.32 kg/m².     VS: No palpable pulse, no blood pressure   GEN: Pallor with no spontaneous movement   HEENT: Pupils fixed and dilated   CVS: No heart sounds audible   Chest: No audible lung sounds  Neuro: Absent reflexes    Discharge Condition:     Time Spent on discharge is more than 30 minutes    Signed:  Warner Kee MD,   PGY1 Internal Medicine Resident  [phone]  2021, 8:33 AM

## 2021-11-09 NOTE — PROGRESS NOTES
ICU Progress Note  PGY-1    Admit Date: 10/31/2021  ICU Day: Hospital Day: 10  Intubated 10/30  Vent Settings: Vent Mode: PRVC Rate Set: 44 bmp/Vt Ordered: 425 mL/ /FiO2 : 100 %  IV Access: RIJ  IV Fluids: none  Vasopressors: levo, gautam   Antibiotics: vanc, merem  Diet: Diet NPO  Code Status: DNR-CCA       Interval history:  - pt family decided to transition code status to [facility]  - overall clinical status continues to deteriorate: satting in 60s with dips into 40s on 3 pressors and maxxed vent settings      Medications:   Scheduled Meds:   heparin (porcine)  1,000 Units IntraCATHeter Once    meropenem  1,000 mg IntraVENous Q8H    sodium zirconium cyclosilicate  10 g Oral Daily    vancomycin (VANCOCIN) intermittent dosing (placeholder)   Other See Admin Instructions    insulin glargine  15 Units SubCUTAneous Nightly    senna  5 mL Oral Nightly    polyethylene glycol  17 g Oral BID    famotidine  20 mg Per NG tube Daily    sodium chloride flush  5-40 mL IntraVENous 2 times per day    dexamethasone  10 mg IntraVENous Q24H    insulin lispro  0-18 Units SubCUTAneous Q6H       Continuous Infusions:   vasopressin (Septic Shock) infusion 0.04 Units/min (11/09/21 0500)    dialysis builder 1,000 mL/hr at 11/09/21 0304    dialysis builder 1,200 mL/hr at 11/09/21 0148    dextrose      IV infusion builder 200 mL/hr at 11/09/21 0117    phenylephrine (GAUTAM-SYNEPHRINE) 50mg/250mL infusion 300 mcg/min (11/09/21 0506)    norepinephrine 10 mcg/min (11/09/21 0500)    cisatracurium (NIMBEX) infusion 2 mcg/kg/min (11/09/21 0500)    heparin (PORCINE) Infusion 17 Units/kg/hr (11/09/21 0500)    propofol 50 mcg/kg/min (11/09/21 0500)    fentaNYL 200 mcg/hr (11/09/21 0500)    sodium chloride         PRN Meds:  potassium chloride, magnesium sulfate, sodium phosphate IVPB **OR** sodium phosphate IVPB **OR** sodium phosphate IVPB **OR** sodium phosphate IVPB, calcium gluconate **OR** calcium gluconate **OR** calcium gluconate **OR** calcium gluconate, glucose, dextrose, glucagon (rDNA), dextrose, heparin (porcine), acetaminophen **OR** acetaminophen, heparin (porcine), heparin (porcine), sodium chloride flush, sodium chloride, ondansetron **OR** ondansetron, polyethylene glycol    Vital/I&O/Physical examination:   VS:  /68   Pulse 102   Temp 97.3 °F (36.3 °C) (Bladder)   Resp (!) 44   Ht 5' 6\" (1.676 m)   Wt 243 lb 9.7 oz (110.5 kg)   SpO2 (!) 71%   BMI 39.32 kg/m²     I/O:    Intake/Output Summary (Last 24 hours) at 11/9/2021 0538  Last data filed at 11/9/2021 0500  Gross per 24 hour   Intake 6430.78 ml   Output 14 ml   Net 6416.78 ml     Review of Systems   Unable to perform ROS: Intubated       PE:  Physical Exam  Constitutional:       Interventions: He is sedated and intubated. Comments: Sedated   Cardiovascular:      Rate and Rhythm: Regular rhythm. Tachycardia present. Pulses: Normal pulses. Heart sounds: Normal heart sounds. Pulmonary:      Effort: He is intubated. Breath sounds: Rales present. No wheezing. Comments: Mechanical breath sounds  Abdominal:      General: Bowel sounds are normal. There is no distension. Palpations: Abdomen is soft. Musculoskeletal:         General: No swelling. Right lower leg: No edema. Left lower leg: No edema. Labs & Imaging:   LABS:  Renal:   Recent Labs     11/08/21  0128 11/08/21  0440 11/08/21  1630 11/09/21  0315   NA  --  140 135*  135* 128*  130*   K   < > 5.9* 5.7*  5.7* 5.8*  5.7*   CL  --  97* 94*  94* 88*  89*   CO2  --  17* 17*  19* 18*  18*   BUN  --  145* 134*  135* 99*  99*   CREATININE  --  5.2* 5.1*  5.0* 4.3*  4.3*   GLUCOSE  --  209* 243*  238* 281*  283*   ANIONGAP  --  26* 24*  22* 22*  23*    < > = values in this interval not displayed.      CBC:   Recent Labs     11/07/21  0424 11/08/21  0440 11/09/21  0315   WBC 16.8* 39.5* 47.6*   HGB 8.8* 10.4* 10.0*   HCT 27.4* 32.6* 31.2*    193 116*   MCV 91.7 93.4 93.0                            Hepatic:   Recent Labs     11/08/21  0440 11/08/21  1630 11/09/21  0315   AST >7,000* >7,000* >7,000*  >7,000*   ALT >7,000* >7,000* >7,000*  >7,000*   BILITOT 2.6* 3.0* 3.8*  3.8*   ALKPHOS 166* 181* 209*  211*     Troponin: No results for input(s): TROPONINI in the last 72 hours. BNP: No results for input(s): BNP in the last 72 hours. Lipids: No results for input(s): CHOL, HDL in the last 72 hours. Invalid input(s): LDLCALCU, TRIGLYCERIDE  INR:   Recent Labs     11/08/21  0200   INR 1.72*     Lactate:   Recent Labs     11/07/21  1226   LACTATE 1.44     ABGs:  Recent Labs     11/08/21  1335 11/08/21  2046 11/09/21  0311   PHART 7.144* 7.093* 7.041*   OVD2OXD 58.7* 66.9* 75.0*   PO2ART 65.1* 52.8* 49.6*   QNI5RUK 20.2* 20.5* 20.3*   BEART -9* -9* -10*   U9OXFYUW 85* 72* 65*   EYF7VRY 22 23 23       UA:  Recent Labs     11/07/21  1100   COLORU Yellow   PHUR 6.0   WBCUA 3-5   RBCUA >100*   BACTERIA Rare*   CLARITYU CLOUDY*   SPECGRAV 1.020   LEUKOCYTESUR SMALL*   UROBILINOGEN 0.2   BILIRUBINUR Negative   BLOODU LARGE*   GLUCOSEU Negative        IMAGING:  XR CHEST PORTABLE   Final Result   Impression: Interval placement of a left IJ dialysis catheter terminating at the junction of the left brachiocephalic vein and superior vena cava. XR CHEST PORTABLE   Final Result   Impression: No significant interval change. XR CHEST PORTABLE   Final Result   1. No interval changes      XR CHEST PORTABLE   Final Result   1. Stable chest with diffuse extensive pulmonary groundglass airspace disease   2. No interval development of pneumothorax or pneumomediastinum      XR CHEST PORTABLE   Final Result   1. Stable chest      XR CHEST PORTABLE   Final Result   Impression: No significant interval change. Stable support devices. XR CHEST PORTABLE   Final Result      Extensive diffuse coarse consolidation mildly progressive since 11/3/2021.       Stable cardiomediastinal silhouette. Lines and tubes without change. No evidence of pneumothorax. Subtle linear lucency adjacent to the right and left heart borders may represent pneumomediastinum. XR CHEST PORTABLE   Final Result      Lines and tubes unchanged. Diffuse airspace disease similar to prior study. XR CHEST PORTABLE   Final Result   Impression: Stable appearance of the chest. No discrete pneumothorax. XR CHEST PORTABLE   Final Result   1. No interval change   2. No evidence of pneumothorax             XR CHEST PORTABLE   Final Result      1. ET tube about 3 cm above the dayna. 2.  Stable small left apical pneumothorax. 3.  Stable appearance of diffuse   4. Stable appearance of diffuse new mediastinal and thoracic inlet soft tissue gas. XR CHEST PORTABLE   Final Result      1. Small left apical pneumothorax. Pneumomediastinum. Soft tissue gas in the neck and chest wall. Findings suggestive of barotrauma. 2.  ET tube 5 cm above the dayna. 3.  Enteric tube tip in the distal stomach. 4.  Extensive bilateral airspace disease compatible with severe COVID-19 pneumonia and/or ARDS. Critical results reported to ICU nurseConstanza, 7:40 AM on 10/31/21. XR ABDOMEN (KUB) (SINGLE AP VIEW)   Final Result      1. Small left apical pneumothorax. Pneumomediastinum. Soft tissue gas in the neck and chest wall. Findings suggestive of barotrauma. 2.  ET tube 5 cm above the dayna. 3.  Enteric tube tip in the distal stomach. 4.  Extensive bilateral airspace disease compatible with severe COVID-19 pneumonia and/or ARDS. Critical results reported to ICU nurseConstanza, 7:40 AM on 10/31/21.             XR CHEST PORTABLE    (Results Pending)   XR CHEST PORTABLE    (Results Pending)       Assessment & Plan:      Acute hypoxic respiratory failure requiring mechanical ventilation   ARDS 2/2 Covid 19 pneumonia c/b septic shock and shock liver: Tested + 10/14, intubated 1/30. Prognosis very poor at this point likely will not last the next 24 hrs  - intubated: Vent Mode: PRVC Rate Set: 44 bmp/Vt Ordered: 425 mL/ /FiO2 : 100 %  - sedated: fent, prop  - paralytic: nimbex  - decadron ARDS protocol  - palliative care following    AGMA 2/2 renal failure + Respiratory acidosis: continued worsening of acidosis  - bicarb gtt    Fever of unknown origin: worsening leukocytosis and fever with procal 0.41. Concern for superimposed bacterial infection vs progression of COVID sepsis  - FU cxs  - vanc/merem     TRINIDAD, Hyperkalemia: cxn for peaked T-waves on EKG. UOP has been decreasing despite significant volume in with uptrending Cr and BUN  - nephrology following  - CRRT as tolerated  - lokelma 10g daily x3    Small left apical PTX: suspected 2/2 barotrauma from PAP  - daily cxr     Pulmonary embolism  - on heparin gtt    Hypernatremia: resolved  - free water in TFs    Code Status: DNR-CCA  FEN: Diet NPO   - TFs held in setting of increased pressor requirement  PPX:heparin gtt, pepcid  DISPO: ICU    This patient will be discussed with attending, Dr. Rodney Ramos MD PGY- 1  Contact via [employer]  11/9/2021,  5:38 AM     Patient seen, examined and discussed with the resident and I agree with the assessment and plan as edited above. Patient continued to worsen overnight and family wisely opted to transition to comfort care.     Yareli Dean MD

## 2021-11-09 NOTE — PROGRESS NOTES
Wife Linn Runner and Jian Espinosa at bedside. All questions answered. CRRT running. Net positive due to increasing pressor requirements. Family also updated by ICU medical team. Will cont to monitor.

## 2021-11-09 NOTE — PROGRESS NOTES
Nimbex discontinued at 0810. Code status changed to [facility]. Family at the bedside.

## 2021-11-09 NOTE — DEATH NOTES
Death Pronouncement Note  Patient's Name: Jaimee Rachel   Patient's YOB: 1970  MRN Number: [id number]    Admitting Provider: Dakota Salvador MD  Attending Provider: Carmen Pierre MD    Patient was examined and the following were absent: Pulses, Blood Pressure and Respiratory effort    I declared the patient dead on 11/9/2021 at 10:52 AM    Preliminary Cause of Death: [place]     Electronically signed by Marycruz Chakraborty MD on 11/9/21 at 10:52 AM EST

## 2021-11-09 NOTE — SIGNIFICANT EVENT
Spoke with family regarding wishes for patient. They would like to withdraw care. Give stop nimbex and prepare for withdrawal of care when family is ready after 1 hour of nimbex being off. Changed to [facility], added comfort meds.

## 2021-11-09 NOTE — PROGRESS NOTES
Post mortem care performed and checklist complete. Wife Kendra updated at the bedside at time of withdrawal of the post mortem process.  home notified. Transport to retrieve the pt for transfer to the [facility].

## 2021-11-09 NOTE — PROGRESS NOTES
Results for [place] (MRN [id number]) as of 11/9/2021 03:18   Ref.  Range 11/9/2021 03:11   pH, Arterial Latest Ref Range: 7.350 - 7.450  7.041 (LL)   pCO2, Arterial Latest Ref Range: 35.0 - 45.0 mm Hg 75.0 (HH)   pO2, Arterial Latest Ref Range: 75.0 - 108.0 mm Hg 49.6 (L)   HCO3, Arterial Latest Ref Range: 21.0 - 29.0 mmol/L 20.3 (L)   TCO2 (calc), Art Latest Ref Range: Not Established mmol/L 23   Base Excess, Arterial Latest Ref Range: -3 - 3  -10 (L)   O2 Sat, Arterial Latest Ref Range: 93 - 100 % 65 (L)   Sample Type Unknown ART

## 2021-11-11 LAB
BLOOD CULTURE, ROUTINE: NORMAL
CULTURE, BLOOD 2: NORMAL